# Patient Record
Sex: MALE | Race: WHITE | Employment: OTHER | URBAN - METROPOLITAN AREA
[De-identification: names, ages, dates, MRNs, and addresses within clinical notes are randomized per-mention and may not be internally consistent; named-entity substitution may affect disease eponyms.]

---

## 2017-01-16 ENCOUNTER — OFFICE VISIT (OUTPATIENT)
Dept: ORTHOPEDIC SURGERY | Age: 32
End: 2017-01-16

## 2017-01-16 VITALS — WEIGHT: 190.04 LBS | HEIGHT: 68 IN | BODY MASS INDEX: 28.8 KG/M2

## 2017-01-16 DIAGNOSIS — M25.552 LEFT HIP PAIN: ICD-10-CM

## 2017-01-16 DIAGNOSIS — T84.019D: Primary | ICD-10-CM

## 2017-01-16 DIAGNOSIS — Z96.649 S/P REVISION OF TOTAL HIP: ICD-10-CM

## 2017-01-16 PROCEDURE — G8419 CALC BMI OUT NRM PARAM NOF/U: HCPCS | Performed by: ORTHOPAEDIC SURGERY

## 2017-01-16 PROCEDURE — 73502 X-RAY EXAM HIP UNI 2-3 VIEWS: CPT | Performed by: ORTHOPAEDIC SURGERY

## 2017-01-16 PROCEDURE — 99213 OFFICE O/P EST LOW 20 MIN: CPT | Performed by: ORTHOPAEDIC SURGERY

## 2017-01-16 PROCEDURE — G8427 DOCREV CUR MEDS BY ELIG CLIN: HCPCS | Performed by: ORTHOPAEDIC SURGERY

## 2017-01-16 PROCEDURE — 1036F TOBACCO NON-USER: CPT | Performed by: ORTHOPAEDIC SURGERY

## 2017-01-16 PROCEDURE — G8484 FLU IMMUNIZE NO ADMIN: HCPCS | Performed by: ORTHOPAEDIC SURGERY

## 2017-01-16 RX ORDER — HYDROCODONE BITARTRATE AND ACETAMINOPHEN 10; 325 MG/1; MG/1
1 TABLET ORAL EVERY 8 HOURS PRN
COMMUNITY
End: 2017-01-31

## 2017-01-18 ENCOUNTER — HOSPITAL ENCOUNTER (OUTPATIENT)
Dept: OTHER | Age: 32
Discharge: HOME | End: 2017-01-18
Attending: ORTHOPAEDIC SURGERY | Admitting: ORTHOPAEDIC SURGERY

## 2017-01-18 DIAGNOSIS — T84.019D: ICD-10-CM

## 2017-01-18 LAB
BASOPHILS ABSOLUTE: 0.1 K/UL (ref 0–0.2)
BASOPHILS RELATIVE PERCENT: 0.7 %
C-REACTIVE PROTEIN: 12.4 MG/L (ref 0–5.1)
EOSINOPHILS ABSOLUTE: 0 K/UL (ref 0–0.6)
EOSINOPHILS RELATIVE PERCENT: 0.4 %
HCT VFR BLD CALC: 43.1 % (ref 40.5–52.5)
HEMOGLOBIN: 14.3 G/DL (ref 13.5–17.5)
LYMPHOCYTES ABSOLUTE: 1.1 K/UL (ref 1–5.1)
LYMPHOCYTES RELATIVE PERCENT: 14 %
MCH RBC QN AUTO: 29.8 PG (ref 26–34)
MCHC RBC AUTO-ENTMCNC: 33.1 G/DL (ref 31–36)
MCV RBC AUTO: 89.8 FL (ref 80–100)
MONOCYTES ABSOLUTE: 0.4 K/UL (ref 0–1.3)
MONOCYTES RELATIVE PERCENT: 5.3 %
NEUTROPHILS ABSOLUTE: 6 K/UL (ref 1.7–7.7)
NEUTROPHILS RELATIVE PERCENT: 79.6 %
PDW BLD-RTO: 13.5 % (ref 12.4–15.4)
PLATELET # BLD: 217 K/UL (ref 135–450)
PMV BLD AUTO: 10 FL (ref 5–10.5)
RBC # BLD: 4.8 M/UL (ref 4.2–5.9)
SEDIMENTATION RATE, ERYTHROCYTE: 10 MM/HR (ref 0–15)
WBC # BLD: 7.6 K/UL (ref 4–11)

## 2017-01-20 LAB — CHROMIUM, SERUM: 7.3 UG/L

## 2017-01-23 ENCOUNTER — TELEPHONE (OUTPATIENT)
Dept: ORTHOPEDIC SURGERY | Age: 32
End: 2017-01-23

## 2017-01-31 ENCOUNTER — HOSPITAL ENCOUNTER (OUTPATIENT)
Dept: SURGERY | Age: 32
Discharge: HOME | End: 2017-01-31
Attending: ORTHOPAEDIC SURGERY | Admitting: ORTHOPAEDIC SURGERY

## 2017-01-31 VITALS
BODY MASS INDEX: 28.73 KG/M2 | WEIGHT: 194 LBS | HEART RATE: 85 BPM | SYSTOLIC BLOOD PRESSURE: 132 MMHG | OXYGEN SATURATION: 97 % | DIASTOLIC BLOOD PRESSURE: 85 MMHG | HEIGHT: 69 IN | TEMPERATURE: 97.2 F | RESPIRATION RATE: 16 BRPM

## 2017-01-31 DIAGNOSIS — R52 PAIN: ICD-10-CM

## 2017-01-31 LAB
ANION GAP SERPL CALCULATED.3IONS-SCNC: 14 MMOL/L (ref 3–16)
BUN BLDV-MCNC: 15 MG/DL (ref 7–20)
CALCIUM SERPL-MCNC: 9.5 MG/DL (ref 8.3–10.6)
CHLORIDE BLD-SCNC: 99 MMOL/L (ref 99–110)
CO2: 22 MMOL/L (ref 21–32)
CREAT SERPL-MCNC: <0.5 MG/DL (ref 0.9–1.3)
GFR AFRICAN AMERICAN: >60
GFR NON-AFRICAN AMERICAN: >60
GLUCOSE BLD-MCNC: 319 MG/DL (ref 70–99)
GLUCOSE BLD-MCNC: 367 MG/DL (ref 70–99)
GLUCOSE BLD-MCNC: 386 MG/DL (ref 70–99)
PERFORMED ON: ABNORMAL
PERFORMED ON: ABNORMAL
POTASSIUM SERPL-SCNC: 4.1 MMOL/L (ref 3.5–5.1)
SODIUM BLD-SCNC: 135 MMOL/L (ref 136–145)

## 2017-01-31 RX ORDER — HYDROCODONE BITARTRATE AND ACETAMINOPHEN 10; 325 MG/1; MG/1
1 TABLET ORAL EVERY 8 HOURS PRN
Qty: 20 TABLET | Refills: 0 | Status: ON HOLD | OUTPATIENT
Start: 2017-01-31 | End: 2020-05-10

## 2017-01-31 RX ORDER — DIPHENHYDRAMINE HYDROCHLORIDE 50 MG/ML
12.5 INJECTION INTRAMUSCULAR; INTRAVENOUS
Status: ACTIVE | OUTPATIENT
Start: 2017-01-31 | End: 2017-01-31

## 2017-01-31 RX ORDER — HYDRALAZINE HYDROCHLORIDE 20 MG/ML
5 INJECTION INTRAMUSCULAR; INTRAVENOUS EVERY 10 MIN PRN
Status: DISCONTINUED | OUTPATIENT
Start: 2017-01-31 | End: 2017-02-01 | Stop reason: HOSPADM

## 2017-01-31 RX ORDER — LABETALOL HYDROCHLORIDE 5 MG/ML
5 INJECTION, SOLUTION INTRAVENOUS EVERY 10 MIN PRN
Status: DISCONTINUED | OUTPATIENT
Start: 2017-01-31 | End: 2017-02-01 | Stop reason: HOSPADM

## 2017-01-31 RX ORDER — PROMETHAZINE HYDROCHLORIDE 25 MG/ML
6.25 INJECTION, SOLUTION INTRAMUSCULAR; INTRAVENOUS
Status: DISCONTINUED | OUTPATIENT
Start: 2017-01-31 | End: 2017-02-01 | Stop reason: HOSPADM

## 2017-01-31 RX ORDER — OXYCODONE HYDROCHLORIDE AND ACETAMINOPHEN 5; 325 MG/1; MG/1
2 TABLET ORAL PRN
Status: COMPLETED | OUTPATIENT
Start: 2017-01-31 | End: 2017-01-31

## 2017-01-31 RX ORDER — LIDOCAINE HYDROCHLORIDE 10 MG/ML
2 INJECTION, SOLUTION INFILTRATION; PERINEURAL
Status: COMPLETED | OUTPATIENT
Start: 2017-01-31 | End: 2017-01-31

## 2017-01-31 RX ORDER — MORPHINE SULFATE 2 MG/ML
2 INJECTION, SOLUTION INTRAMUSCULAR; INTRAVENOUS EVERY 5 MIN PRN
Status: DISCONTINUED | OUTPATIENT
Start: 2017-01-31 | End: 2017-02-01 | Stop reason: HOSPADM

## 2017-01-31 RX ORDER — ONDANSETRON 2 MG/ML
4 INJECTION INTRAMUSCULAR; INTRAVENOUS PRN
Status: DISCONTINUED | OUTPATIENT
Start: 2017-01-31 | End: 2017-02-01 | Stop reason: HOSPADM

## 2017-01-31 RX ORDER — MIDAZOLAM HYDROCHLORIDE 1 MG/ML
2 INJECTION INTRAMUSCULAR; INTRAVENOUS ONCE
Status: COMPLETED | OUTPATIENT
Start: 2017-01-31 | End: 2017-01-31

## 2017-01-31 RX ORDER — OXYCODONE HYDROCHLORIDE AND ACETAMINOPHEN 5; 325 MG/1; MG/1
1 TABLET ORAL PRN
Status: COMPLETED | OUTPATIENT
Start: 2017-01-31 | End: 2017-01-31

## 2017-01-31 RX ORDER — SODIUM CHLORIDE, SODIUM LACTATE, POTASSIUM CHLORIDE, CALCIUM CHLORIDE 600; 310; 30; 20 MG/100ML; MG/100ML; MG/100ML; MG/100ML
INJECTION, SOLUTION INTRAVENOUS CONTINUOUS
Status: DISCONTINUED | OUTPATIENT
Start: 2017-01-31 | End: 2017-02-01 | Stop reason: HOSPADM

## 2017-01-31 RX ORDER — MORPHINE SULFATE 2 MG/ML
1 INJECTION, SOLUTION INTRAMUSCULAR; INTRAVENOUS EVERY 5 MIN PRN
Status: DISCONTINUED | OUTPATIENT
Start: 2017-01-31 | End: 2017-02-01 | Stop reason: HOSPADM

## 2017-01-31 RX ORDER — MEPERIDINE HYDROCHLORIDE 50 MG/ML
12.5 INJECTION INTRAMUSCULAR; INTRAVENOUS; SUBCUTANEOUS EVERY 5 MIN PRN
Status: DISCONTINUED | OUTPATIENT
Start: 2017-01-31 | End: 2017-02-01 | Stop reason: HOSPADM

## 2017-01-31 RX ADMIN — OXYCODONE HYDROCHLORIDE AND ACETAMINOPHEN 2 TABLET: 5; 325 TABLET ORAL at 08:55

## 2017-01-31 RX ADMIN — MIDAZOLAM HYDROCHLORIDE 2 MG: 1 INJECTION INTRAMUSCULAR; INTRAVENOUS at 07:00

## 2017-01-31 RX ADMIN — LIDOCAINE HYDROCHLORIDE 2 ML: 10 INJECTION, SOLUTION INFILTRATION; PERINEURAL at 06:35

## 2017-01-31 RX ADMIN — Medication 0.5 MG: at 08:03

## 2017-01-31 RX ADMIN — SODIUM CHLORIDE, SODIUM LACTATE, POTASSIUM CHLORIDE, CALCIUM CHLORIDE: 600; 310; 30; 20 INJECTION, SOLUTION INTRAVENOUS at 06:35

## 2017-01-31 ASSESSMENT — PAIN SCALES - GENERAL
PAINLEVEL_OUTOF10: 0
PAINLEVEL_OUTOF10: 9
PAINLEVEL_OUTOF10: 8
PAINLEVEL_OUTOF10: 0

## 2017-01-31 ASSESSMENT — PAIN - FUNCTIONAL ASSESSMENT: PAIN_FUNCTIONAL_ASSESSMENT: 0-10

## 2017-01-31 ASSESSMENT — PAIN DESCRIPTION - DESCRIPTORS: DESCRIPTORS: ACHING;SHARP

## 2017-02-06 ENCOUNTER — OFFICE VISIT (OUTPATIENT)
Dept: ORTHOPEDIC SURGERY | Age: 32
End: 2017-02-06

## 2017-02-06 DIAGNOSIS — T84.019D: Primary | ICD-10-CM

## 2017-02-06 PROCEDURE — G8484 FLU IMMUNIZE NO ADMIN: HCPCS | Performed by: ORTHOPAEDIC SURGERY

## 2017-02-06 PROCEDURE — 99214 OFFICE O/P EST MOD 30 MIN: CPT | Performed by: ORTHOPAEDIC SURGERY

## 2017-02-06 PROCEDURE — 1036F TOBACCO NON-USER: CPT | Performed by: ORTHOPAEDIC SURGERY

## 2017-02-06 PROCEDURE — G8419 CALC BMI OUT NRM PARAM NOF/U: HCPCS | Performed by: ORTHOPAEDIC SURGERY

## 2017-02-06 PROCEDURE — G8428 CUR MEDS NOT DOCUMENT: HCPCS | Performed by: ORTHOPAEDIC SURGERY

## 2017-02-20 ENCOUNTER — HOSPITAL ENCOUNTER (OUTPATIENT)
Dept: OTHER | Age: 32
Discharge: HOME | End: 2017-02-20
Attending: PHYSICIAN ASSISTANT | Admitting: PHYSICIAN ASSISTANT

## 2017-02-20 ENCOUNTER — HOSPITAL ENCOUNTER (OUTPATIENT)
Dept: PHYSICAL THERAPY | Age: 32
Discharge: HOME | End: 2017-02-28
Admitting: ORTHOPAEDIC SURGERY

## 2017-02-20 LAB
ALBUMIN SERPL-MCNC: 4.5 G/DL (ref 3.4–5)
ANION GAP SERPL CALCULATED.3IONS-SCNC: 15 MMOL/L (ref 3–16)
APTT: 25.3 SEC (ref 21–31.8)
BASOPHILS ABSOLUTE: 0 K/UL (ref 0–0.2)
BASOPHILS RELATIVE PERCENT: 0.4 %
BILIRUBIN URINE: NEGATIVE
BLOOD, URINE: NEGATIVE
BUN BLDV-MCNC: 11 MG/DL (ref 7–20)
CALCIUM SERPL-MCNC: 9.5 MG/DL (ref 8.3–10.6)
CHLORIDE BLD-SCNC: 97 MMOL/L (ref 99–110)
CLARITY: CLEAR
CO2: 23 MMOL/L (ref 21–32)
COLOR: YELLOW
CREAT SERPL-MCNC: <0.5 MG/DL (ref 0.9–1.3)
EOSINOPHILS ABSOLUTE: 0.1 K/UL (ref 0–0.6)
EOSINOPHILS RELATIVE PERCENT: 1.6 %
GFR AFRICAN AMERICAN: >60
GFR NON-AFRICAN AMERICAN: >60
GLUCOSE BLD-MCNC: 245 MG/DL (ref 70–99)
GLUCOSE URINE: >=1000 MG/DL
HCT VFR BLD CALC: 43.4 % (ref 40.5–52.5)
HEMOGLOBIN: 14.3 G/DL (ref 13.5–17.5)
INR BLD: 1.11 (ref 0.85–1.15)
KETONES, URINE: NEGATIVE MG/DL
LEUKOCYTE ESTERASE, URINE: NEGATIVE
LYMPHOCYTES ABSOLUTE: 2.1 K/UL (ref 1–5.1)
LYMPHOCYTES RELATIVE PERCENT: 31.6 %
MCH RBC QN AUTO: 29.5 PG (ref 26–34)
MCHC RBC AUTO-ENTMCNC: 33 G/DL (ref 31–36)
MCV RBC AUTO: 89.3 FL (ref 80–100)
MICROSCOPIC EXAMINATION: ABNORMAL
MONOCYTES ABSOLUTE: 0.6 K/UL (ref 0–1.3)
MONOCYTES RELATIVE PERCENT: 9.4 %
NEUTROPHILS ABSOLUTE: 3.7 K/UL (ref 1.7–7.7)
NEUTROPHILS RELATIVE PERCENT: 57 %
NITRITE, URINE: NEGATIVE
PDW BLD-RTO: 13.1 % (ref 12.4–15.4)
PH UA: 6
PLATELET # BLD: 203 K/UL (ref 135–450)
PMV BLD AUTO: 9.6 FL (ref 5–10.5)
POTASSIUM SERPL-SCNC: 3.8 MMOL/L (ref 3.5–5.1)
PROTEIN UA: NEGATIVE MG/DL
PROTHROMBIN TIME: 12.5 SEC (ref 9.6–13)
RBC # BLD: 4.86 M/UL (ref 4.2–5.9)
SODIUM BLD-SCNC: 135 MMOL/L (ref 136–145)
SPECIFIC GRAVITY UA: 1.02
TRANSFERRIN: 291 MG/DL (ref 200–360)
URINE TYPE: ABNORMAL
UROBILINOGEN, URINE: 0.2 E.U./DL
WBC # BLD: 6.5 K/UL (ref 4–11)

## 2017-02-21 LAB
ESTIMATED AVERAGE GLUCOSE: 191.5 MG/DL
HBA1C MFR BLD: 8.3 %

## 2017-02-22 LAB — URINE CULTURE, ROUTINE: NORMAL

## 2017-10-06 ENCOUNTER — HOSPITAL ENCOUNTER (OUTPATIENT)
Dept: PHYSICAL THERAPY | Age: 32
Discharge: HOME | End: 2017-10-31
Admitting: EMERGENCY MEDICINE

## 2017-10-06 NOTE — PLAN OF CARE
G-Codes  Functional Assessment Tool Used: LEFS  Score: 60%  Functional Limitation: Mobility: Walking and moving around  Mobility: Walking and Moving Around Current Status (): At least 40 percent but less than 60 percent impaired, limited or restricted  Mobility: Walking and Moving Around Goal Status (): At least 20 percent but less than 40 percent impaired, limited or restricted    Pain Scale: 6/10  Easing factors: rest, stretch  Provocative factors: walk, squat     Type: []Constant   [x]Intermittent  []Radiating []Localized []other:     Numbness/Tingling: none    Occupation/School:  (returned yesterday)    Living Status/Prior Level of Function: Independent with ADLs and IADLs    OBJECTIVE:     ROM LEFT RIGHT   HIP Flex     HIP Abd     HIP Ext     HIP IR     HIP ER     Knee ext 0 0   Knee Flex 130 132   Ankle PF     Ankle DF     Ankle In     Ankle Ev     Strength  LEFT RIGHT   HIP Flexors 4/5 4+/5   HIP Abductors     HIP Ext 4+/5 4+/5   Hip ER 4/5 4/5   Knee EXT (quad) 5/5 5/5   Knee Flex (HS) 4-/5 4+/5   Ankle DF     Ankle PF     Ankle Inv     Ankle EV          Circumference  Mid apex  7 cm prox             Reflexes/Sensation:    [x]Dermatomes/Myotomes intact    [x]Reflexes equal and normal bilaterally   []Other:    Joint mobility:    [x]Normal    []Hypo   []Hyper    Palpation: no TTP    Functional Mobility/Transfers: independent    Gait: (include devices/WB status) antalgic     Orthopedic Special Tests:     Gay + at 0, 30 deg                        [x] Patient history, allergies, meds reviewed. Medical chart reviewed. See intake form. Review Of Systems (ROS):  [x]Performed Review of systems (Integumentary, CardioPulmonary, Neurological) by intake and observation. Intake form has been scanned into medical record. Patient has been instructed to contact their primary care physician regarding ROS issues if not already being addressed at this time.       Co-morbidities/Complexities (which will affect course of rehabilitation):   []None           Arthritic conditions   [x]Rheumatoid arthritis (M05.9)  [x]Osteoarthritis (M19.91)   Cardiovascular conditions   [x]Hypertension (I10)  []Hyperlipidemia (E78.5)  []Angina pectoris (I20)  []Atherosclerosis (I70)   Musculoskeletal conditions   []Disc pathology   []Congenital spine pathologies   []Prior surgical intervention  []Osteoporosis (M81.8)  []Osteopenia (M85.8)   Endocrine conditions   []Hypothyroid (E03.9)  []Hyperthyroid Gastrointestinal conditions   []Constipation (Y43.69)   Metabolic conditions   []Morbid obesity (E66.01)  [x]Diabetes type 1(E10.65) or 2 (E11.65)   []Neuropathy (G60.9)     Pulmonary conditions   []Asthma (J45)  []Coughing   []COPD (J44.9)   Psychological Disorders  [x]Anxiety (F41.9)  [x]Depression (F32.9)   []Other:   []Other:          Barriers to/and or personal factors that will affect rehab potential:              []Age  []Sex              []Motivation/Lack of Motivation                        [x]Co-Morbidities              []Cognitive Function, education/learning barriers              []Environmental, home barriers              []profession/work barriers  []past PT/medical experience  []other:  Justification:     Falls Risk Assessment (30 days):   [x] Falls Risk assessed and no intervention required. [] Falls Risk assessed and Patient requires intervention due to being higher risk   TUG score (>12s at risk):     [] Falls education provided, including       G-Codes:  PT G-Codes  Functional Assessment Tool Used: LEFS  Score: 60%  Functional Limitation: Mobility: Walking and moving around  Mobility: Walking and Moving Around Current Status (): At least 40 percent but less than 60 percent impaired, limited or restricted  Mobility: Walking and Moving Around Goal Status ():  At least 20 percent but less than 40 percent impaired, limited or restricted    ASSESSMENT:   Functional Impairments:     []Noted lumbar/proximal hip/LE evaluation/treatment:    []Excellent   [x]Good    []Fair   []Poor    Physical Therapy Evaluation Complexity Justification  [x] A history of present problem with:  [] no personal factors and/or comorbidities that impact the plan of care;  [x]1-2 personal factors and/or comorbidities that impact the plan of care  []3 personal factors and/or comorbidities that impact the plan of care  [x] An examination of body systems using standardized tests and measures addressing any of the following: body structures and functions (impairments), activity limitations, and/or participation restrictions;:  [] a total of 1-2 or more elements   [x] a total of 3 or more elements   [] a total of 4 or more elements   [x] A clinical presentation with:  [x] stable and/or uncomplicated characteristics   [] evolving clinical presentation with changing characteristics  [] unstable and unpredictable characteristics;   [x] Clinical decision making of [x] low, [] moderate, [] high complexity using standardized patient assessment instrument and/or measurable assessment of functional outcome. [x] EVAL (LOW) 21214 (typically 20 minutes face-to-face)  [] EVAL (MOD) 71859 (typically 30 minutes face-to-face)  [] EVAL (HIGH) 21636 (typically 45 minutes face-to-face)  [] RE-EVAL     PLAN:   Frequency/Duration:  1 days per week for 4-6 Weeks:  Interventions:  [x]  Therapeutic exercise including: strength training, ROM, for Lower extremity and core   [x]  NMR activation and proprioception for LE, Glutes and Core   [x]  Manual therapy as indicated for LE, Hip and spine to include: Dry Needling/IASTM, STM, PROM, Gr I-IV mobilizations, manipulation. [x] Modalities as needed that may include: thermal agents, E-stim, Biofeedback, US, iontophoresis as indicated  [x] Patient education on joint protection, postural re-education, activity modification, progression of HEP.     HEP instruction: (see scanned forms)    GOALS:  Patient stated goal: get back to

## 2017-10-06 NOTE — FLOWSHEET NOTE
[facility]  Orthopaedics and Sports Rehabilitation, [facility]    Physical Therapy Daily Treatment Note  Date:  10/6/2017    Patient Name:  Ilean Gaucher    :  1985  MRN: [id number]  Restrictions/Precautions:    Medical/Treatment Diagnosis Information:  · Diagnosis: M25.562  · Treatment Diagnosis: L knee pain  Insurance/Certification information:  PT Insurance Information:  [facility]  Physician Information:  Referring Practitioner: VIOLET Leary  Plan of care signed (Y/N):     Date of Patient follow up with Physician:     G-Code (if applicable):      Date G-Code Applied:  10/6/17  PT G-Codes  Functional Assessment Tool Used: LEFS  Score: 60%  Functional Limitation: Mobility: Walking and moving around  Mobility: Walking and Moving Around Current Status (): At least 40 percent but less than 60 percent impaired, limited or restricted  Mobility: Walking and Moving Around Goal Status ():  At least 20 percent but less than 40 percent impaired, limited or restricted    Progress Note: [x]  Yes  []  No  Next due by: Visit #10       Latex Allergy:  [x]NO      []YES  Preferred Language for Healthcare:   [x]English       []other:    Visit # Insurance Allowable   1 9 visits approved through 10/29     Pain level:  6/10     SUBJECTIVE:  See eval    OBJECTIVE: See eval  Observation:   Test measurements:      RESTRICTIONS/PRECAUTIONS: none per MD    Exercises/Interventions:     Therapeutic Ex Sets/sec Reps Notes HEP   Prone quad stretch 5 20\"     SLR 2 10     Clam shells 2 10     Bridges 2 10     Wall sits 3 20\"     HS stretch 5 10\"     LAQ 10                                                                     Manual Intervention                                                 NMR re-education                                                                          Therapeutic Exercise and NMR EXR  [x] (02376) Provided verbal/tactile cueing for activities related to strengthening, flexibility, Code Treatment Minutes: 15   Total Treatment Minutes: 15     [x] EVAL (LOW) 94804 (typically 20 minutes face-to-face)  [x] AJ(69178) x  1   [] IONTO  [] NMR (19078) x      [] VASO  [] Manual (08597) x       [] Other:  [] TA x       [] Mech Traction (17113)  [] ES(attended) (43584)      [] ES (un) (66784):     GOALS:   Short Term Goals: To be achieved in: 2 weeks  1. Independent in HEP and progression per patient tolerance, in order to prevent re-injury. 2. Patient will have a decrease in pain to facilitate improvement in movement, function, and ADLs as indicated by Functional Deficits.     Long Term Goals: To be achieved in: 6 weeks  1. Disability index score of 30% or less for the LEFS to assist with reaching prior level of function. 2. Patient will demonstrate an increase in Strength to good proximal hip strength and control, within 5lb HHD in LE to allow for proper functional mobility as indicated by patients Functional Deficits. 3. Patient will return to all functional activities without increased symptoms or restriction. Progression Towards Functional goals:  [] Patient is progressing as expected towards functional goals listed. [] Progression is slowed due to complexities listed. [] Progression has been slowed due to co-morbidities.   [x] Plan just implemented, too soon to assess goals progression  [] Other:     ASSESSMENT:  See eval    Treatment/Activity Tolerance:  [x] Patient tolerated treatment well [] Patient limited by fatique  [] Patient limited by pain  [] Patient limited by other medical complications  [] Other:     Prognosis: [x] Good [] Fair  [] Poor    Patient Requires Follow-up: [x] Yes  [] No    PLAN: See eval  [] Continue per plan of care [] Alter current plan (see comments)  [x] Plan of care initiated [] Hold pending MD visit [] Discharge    Electronically signed by: Danny Quintana PT, DPT

## 2017-10-09 ENCOUNTER — HOSPITAL ENCOUNTER (OUTPATIENT)
Dept: PHYSICAL THERAPY | Age: 32
Discharge: HOME | End: 2017-10-09
Admitting: EMERGENCY MEDICINE

## 2017-10-16 ENCOUNTER — HOSPITAL ENCOUNTER (OUTPATIENT)
Dept: PHYSICAL THERAPY | Age: 32
Discharge: HOME | End: 2017-10-16
Admitting: EMERGENCY MEDICINE

## 2017-10-16 NOTE — FLOWSHEET NOTE
[facility]  Orthopaedics and Sports Rehabilitation, [facility]    Physical Therapy Daily Treatment Note  Date:  10/16/2017    Patient Name:  Wilver Lilly    :  1985  MRN: [id number]  Restrictions/Precautions:    Medical/Treatment Diagnosis Information:  · Diagnosis: M25.562  · Treatment Diagnosis: L knee pain  Insurance/Certification information:  PT Insurance Information:  [facility]  Physician Information:  Referring Practitioner: VIOLET Martinez  Plan of care signed (Y/N):     Date of Patient follow up with Physician:     G-Code (if applicable):      Date G-Code Applied:  10/6/17       Progress Note: [x]  Yes  []  No  Next due by: Visit #10       Latex Allergy:  [x]NO      []YES  Preferred Language for Healthcare:   [x]English       []other:    Visit # Insurance Allowable   3 9 visits approved through 10/29     Pain level:  0/10     SUBJECTIVE:  Patient reports that that he had a little soreness yesterday and this morning, but he is not having pain.     OBJECTIVE: See eval  Observation:   Test measurements:      RESTRICTIONS/PRECAUTIONS: none per MD    Exercises/Interventions:     Therapeutic Ex Sets/sec Reps Notes HEP   Bike 5'      Prone quad stretch 5 20\"     SLR 2 10     Clam shells 2 10     Bridges 2 10     Wall sits 3 30\"     HS stretch 5 10\"     LAQ 10      Cybex  Knee flexion  Knee ext   3  3   10  10   40#  60#    Leg press 3 10 120# Dl, 70# SL    MIRLANDE  Abduction  Extension   2  2   10  10   50#  50#    Step ups 2 15     Squats 2 10 To chair     Side stepping  50\" Orange band                         Manual Intervention                                                 NMR re-education       SL balance on foam 2 30\"                                                                 Therapeutic Exercise and NMR EXR  [x] (93290) Provided verbal/tactile cueing for activities related to strengthening, flexibility, endurance, ROM for improvements in LE, proximal hip, and core control with self care, mobility, lifting, ambulation.  [] (50241) Provided verbal/tactile cueing for activities related to improving balance, coordination, kinesthetic sense, posture, motor skill, proprioception  to assist with LE, proximal hip, and core control in self care, mobility, lifting, ambulation and eccentric single leg control. NMR and Therapeutic Activities:    [x] (22068 or 17851) Provided verbal/tactile cueing for activities related to improving balance, coordination, kinesthetic sense, posture, motor skill, proprioception and motor activation to allow for proper function of core, proximal hip and LE with self care and ADLs  [] (06067) Gait Re-education- Provided training and instruction to the patient for proper LE, core and proximal hip recruitment and positioning and eccentric body weight control with ambulation re-education including up and down stairs     Home Exercise Program:    [x] (16781) Reviewed/Progressed HEP activities related to strengthening, flexibility, endurance, ROM of core, proximal hip and LE for functional self-care, mobility, lifting and ambulation/stair navigation   [] (53571)Reviewed/Progressed HEP activities related to improving balance, coordination, kinesthetic sense, posture, motor skill, proprioception of core, proximal hip and LE for self care, mobility, lifting, and ambulation/stair navigation      Manual Treatments:  PROM / STM / Oscillations-Mobs:  G-I, II, III, IV (PA's, Inf., Post.)  [] (85900) Provided manual therapy to mobilize LE, proximal hip and/or LS spine soft tissue/joints for the purpose of modulating pain, promoting relaxation,  increasing ROM, reducing/eliminating soft tissue swelling/inflammation/restriction, improving soft tissue extensibility and allowing for proper ROM for normal function with self care, mobility, lifting and ambulation.      Modalities:  Declined     Charges:  Timed Code Treatment Minutes: 40   Total Treatment Minutes: 40     [] LORENA (LOW) 75407 (typically 20 minutes face-to-face)  [x] IL(34537) x  2   [] IONTO  [x] NMR (07878) x  1   [] VASO  [] Manual (98037) x       [] Other:  [] TA x       [] Mech Traction (41181)  [] ES(attended) (05900)      [] ES (un) (86154):     GOALS:   Short Term Goals: To be achieved in: 2 weeks  1. Independent in HEP and progression per patient tolerance, in order to prevent re-injury. 2. Patient will have a decrease in pain to facilitate improvement in movement, function, and ADLs as indicated by Functional Deficits.     Long Term Goals: To be achieved in: 6 weeks  1. Disability index score of 30% or less for the LEFS to assist with reaching prior level of function. 2. Patient will demonstrate an increase in Strength to good proximal hip strength and control, within 5lb HHD in LE to allow for proper functional mobility as indicated by patients Functional Deficits. 3. Patient will return to all functional activities without increased symptoms or restriction. Progression Towards Functional goals:  [] Patient is progressing as expected towards functional goals listed. [] Progression is slowed due to complexities listed. [] Progression has been slowed due to co-morbidities. [x] Plan just implemented, too soon to assess goals progression  [] Other:     ASSESSMENT:  Patient was able to progress into higher level resisted exercises for the knee and hip without pain.     Treatment/Activity Tolerance:  [x] Patient tolerated treatment well [] Patient limited by fatique  [] Patient limited by pain  [] Patient limited by other medical complications  [] Other:     Prognosis: [x] Good [] Fair  [] Poor    Patient Requires Follow-up: [x] Yes  [] No    PLAN: See eval  [] Continue per plan of care [] Alter current plan (see comments)  [x] Plan of care initiated [] Hold pending MD visit [] Discharge    Electronically signed by: Bo Guillaume PT, DPT

## 2017-10-19 ENCOUNTER — HOSPITAL ENCOUNTER (OUTPATIENT)
Dept: PHYSICAL THERAPY | Age: 32
Discharge: HOME | End: 2017-10-19
Admitting: EMERGENCY MEDICINE

## 2017-11-01 ENCOUNTER — HOSPITAL ENCOUNTER (OUTPATIENT)
Dept: PHYSICAL THERAPY | Age: 32
Discharge: HOME | End: 2017-11-30
Attending: EMERGENCY MEDICINE | Admitting: EMERGENCY MEDICINE

## 2020-05-10 ENCOUNTER — HOSPITAL ENCOUNTER (INPATIENT)
Age: 35
LOS: 3 days | Discharge: HOME | DRG: 638 | End: 2020-05-13
Attending: EMERGENCY MEDICINE | Admitting: HOSPITALIST
Payer: COMMERCIAL

## 2020-05-10 ENCOUNTER — APPOINTMENT (OUTPATIENT)
Dept: GENERAL RADIOLOGY | Age: 35
DRG: 638 | End: 2020-05-10
Payer: COMMERCIAL

## 2020-05-10 PROBLEM — E11.10 DKA, TYPE 2, NOT AT GOAL (HCC): Status: ACTIVE | Noted: 2020-05-10

## 2020-05-10 LAB
A/G RATIO: 1.6 (ref 1.1–2.2)
ALBUMIN SERPL-MCNC: 4.6 G/DL (ref 3.4–5)
ALP BLD-CCNC: 106 U/L (ref 40–129)
ALT SERPL-CCNC: 12 U/L (ref 10–40)
ANION GAP SERPL CALCULATED.3IONS-SCNC: 34 MMOL/L (ref 3–16)
AST SERPL-CCNC: 7 U/L (ref 15–37)
BASE EXCESS VENOUS: -16.4 MMOL/L (ref -3–3)
BASOPHILS ABSOLUTE: 0 K/UL (ref 0–0.2)
BASOPHILS RELATIVE PERCENT: 0.5 %
BETA-HYDROXYBUTYRATE: >8 MMOL/L (ref 0–0.27)
BILIRUB SERPL-MCNC: 0.5 MG/DL (ref 0–1)
BUN BLDV-MCNC: 42 MG/DL (ref 7–20)
CALCIUM SERPL-MCNC: 9.3 MG/DL (ref 8.3–10.6)
CARBOXYHEMOGLOBIN: 1.3 % (ref 0–1.5)
CHLORIDE BLD-SCNC: 83 MMOL/L (ref 99–110)
CHP ED QC CHECK: YES
CO2: 6 MMOL/L (ref 21–32)
CREAT SERPL-MCNC: 2.5 MG/DL (ref 0.9–1.3)
EKG ATRIAL RATE: 113 BPM
EKG DIAGNOSIS: NORMAL
EKG P AXIS: 52 DEGREES
EKG P-R INTERVAL: 122 MS
EKG Q-T INTERVAL: 346 MS
EKG QRS DURATION: 82 MS
EKG QTC CALCULATION (BAZETT): 474 MS
EKG R AXIS: 73 DEGREES
EKG T AXIS: 52 DEGREES
EKG VENTRICULAR RATE: 113 BPM
EOSINOPHILS ABSOLUTE: 0 K/UL (ref 0–0.6)
EOSINOPHILS RELATIVE PERCENT: 0 %
GFR AFRICAN AMERICAN: 35
GFR NON-AFRICAN AMERICAN: 29
GLOBULIN: 2.9 G/DL
GLUCOSE BLD-MCNC: 253 MG/DL (ref 70–99)
GLUCOSE BLD-MCNC: 502 MG/DL
GLUCOSE BLD-MCNC: 502 MG/DL (ref 70–99)
GLUCOSE BLD-MCNC: 508 MG/DL (ref 70–99)
GLUCOSE BLD-MCNC: 919 MG/DL (ref 70–99)
GLUCOSE BLD-MCNC: >600 MG/DL (ref 70–99)
HCO3 VENOUS: 7.4 MMOL/L (ref 23–29)
HCT VFR BLD CALC: 45.8 % (ref 40.5–52.5)
HEMOGLOBIN: 14.5 G/DL (ref 13.5–17.5)
INR BLD: 1.01 (ref 0.86–1.14)
LIPASE: 55 U/L (ref 13–60)
LYMPHOCYTES ABSOLUTE: 0.5 K/UL (ref 1–5.1)
LYMPHOCYTES RELATIVE PERCENT: 5.1 %
MCH RBC QN AUTO: 31.3 PG (ref 26–34)
MCHC RBC AUTO-ENTMCNC: 31.6 G/DL (ref 31–36)
MCV RBC AUTO: 99 FL (ref 80–100)
METHEMOGLOBIN VENOUS: 0.1 %
MONOCYTES ABSOLUTE: 1 K/UL (ref 0–1.3)
MONOCYTES RELATIVE PERCENT: 9.8 %
NEUTROPHILS ABSOLUTE: 8.6 K/UL (ref 1.7–7.7)
NEUTROPHILS RELATIVE PERCENT: 84.6 %
O2 CONTENT, VEN: 21 VOL %
O2 SAT, VEN: 98 %
O2 THERAPY: ABNORMAL
PCO2, VEN: 16.2 MMHG (ref 40–50)
PDW BLD-RTO: 15.2 % (ref 12.4–15.4)
PERFORMED ON: ABNORMAL
PH VENOUS: 7.28 (ref 7.35–7.45)
PLATELET # BLD: 266 K/UL (ref 135–450)
PMV BLD AUTO: 9 FL (ref 5–10.5)
PO2, VEN: 108.2 MMHG (ref 25–40)
POTASSIUM REFLEX MAGNESIUM: 5.2 MMOL/L (ref 3.5–5.1)
PROTHROMBIN TIME: 11.7 SEC (ref 10–13.2)
RBC # BLD: 4.62 M/UL (ref 4.2–5.9)
SODIUM BLD-SCNC: 123 MMOL/L (ref 136–145)
TCO2 CALC VENOUS: 8 MMOL/L
TOTAL PROTEIN: 7.5 G/DL (ref 6.4–8.2)
TROPONIN: <0.01 NG/ML
WBC # BLD: 10.2 K/UL (ref 4–11)

## 2020-05-10 PROCEDURE — 84100 ASSAY OF PHOSPHORUS: CPT

## 2020-05-10 PROCEDURE — 99291 CRITICAL CARE FIRST HOUR: CPT | Performed by: INTERNAL MEDICINE

## 2020-05-10 PROCEDURE — 93010 ELECTROCARDIOGRAM REPORT: CPT | Performed by: INTERNAL MEDICINE

## 2020-05-10 PROCEDURE — 83735 ASSAY OF MAGNESIUM: CPT

## 2020-05-10 PROCEDURE — 80053 COMPREHEN METABOLIC PANEL: CPT

## 2020-05-10 PROCEDURE — 71045 X-RAY EXAM CHEST 1 VIEW: CPT

## 2020-05-10 PROCEDURE — 2580000003 HC RX 258: Performed by: EMERGENCY MEDICINE

## 2020-05-10 PROCEDURE — 85610 PROTHROMBIN TIME: CPT

## 2020-05-10 PROCEDURE — 82803 BLOOD GASES ANY COMBINATION: CPT

## 2020-05-10 PROCEDURE — 2000000000 HC ICU R&B

## 2020-05-10 PROCEDURE — 99285 EMERGENCY DEPT VISIT HI MDM: CPT

## 2020-05-10 PROCEDURE — 85025 COMPLETE CBC W/AUTO DIFF WBC: CPT

## 2020-05-10 PROCEDURE — 2580000003 HC RX 258: Performed by: HOSPITALIST

## 2020-05-10 PROCEDURE — 83036 HEMOGLOBIN GLYCOSYLATED A1C: CPT

## 2020-05-10 PROCEDURE — 93005 ELECTROCARDIOGRAM TRACING: CPT | Performed by: EMERGENCY MEDICINE

## 2020-05-10 PROCEDURE — 6370000000 HC RX 637 (ALT 250 FOR IP): Performed by: HOSPITALIST

## 2020-05-10 PROCEDURE — 82010 KETONE BODYS QUAN: CPT

## 2020-05-10 PROCEDURE — 84484 ASSAY OF TROPONIN QUANT: CPT

## 2020-05-10 PROCEDURE — 6370000000 HC RX 637 (ALT 250 FOR IP): Performed by: EMERGENCY MEDICINE

## 2020-05-10 PROCEDURE — 36415 COLL VENOUS BLD VENIPUNCTURE: CPT

## 2020-05-10 PROCEDURE — 83690 ASSAY OF LIPASE: CPT

## 2020-05-10 RX ORDER — BUPROPION HYDROCHLORIDE 100 MG/1
100 TABLET, EXTENDED RELEASE ORAL DAILY
Status: DISCONTINUED | OUTPATIENT
Start: 2020-05-11 | End: 2020-05-13 | Stop reason: HOSPADM

## 2020-05-10 RX ORDER — DEXTROSE MONOHYDRATE 50 MG/ML
100 INJECTION, SOLUTION INTRAVENOUS PRN
Status: DISCONTINUED | OUTPATIENT
Start: 2020-05-10 | End: 2020-05-13 | Stop reason: HOSPADM

## 2020-05-10 RX ORDER — 0.9 % SODIUM CHLORIDE 0.9 %
1000 INTRAVENOUS SOLUTION INTRAVENOUS ONCE
Status: COMPLETED | OUTPATIENT
Start: 2020-05-10 | End: 2020-05-10

## 2020-05-10 RX ORDER — POTASSIUM CHLORIDE 7.45 MG/ML
10 INJECTION INTRAVENOUS PRN
Status: DISCONTINUED | OUTPATIENT
Start: 2020-05-10 | End: 2020-05-12

## 2020-05-10 RX ORDER — TIZANIDINE 4 MG/1
4 TABLET ORAL EVERY 6 HOURS PRN
Status: DISCONTINUED | OUTPATIENT
Start: 2020-05-10 | End: 2020-05-13 | Stop reason: HOSPADM

## 2020-05-10 RX ORDER — ACETAMINOPHEN 500 MG
1000 TABLET ORAL ONCE
Status: COMPLETED | OUTPATIENT
Start: 2020-05-10 | End: 2020-05-10

## 2020-05-10 RX ORDER — DULOXETIN HYDROCHLORIDE 60 MG/1
60 CAPSULE, DELAYED RELEASE ORAL DAILY
Status: DISCONTINUED | OUTPATIENT
Start: 2020-05-11 | End: 2020-05-13 | Stop reason: HOSPADM

## 2020-05-10 RX ORDER — NICOTINE POLACRILEX 4 MG
15 LOZENGE BUCCAL PRN
Status: DISCONTINUED | OUTPATIENT
Start: 2020-05-10 | End: 2020-05-13 | Stop reason: HOSPADM

## 2020-05-10 RX ORDER — LORAZEPAM 1 MG/1
1 TABLET ORAL EVERY 4 HOURS PRN
Status: DISCONTINUED | OUTPATIENT
Start: 2020-05-10 | End: 2020-05-13 | Stop reason: HOSPADM

## 2020-05-10 RX ORDER — DEXTROSE MONOHYDRATE 25 G/50ML
12.5 INJECTION, SOLUTION INTRAVENOUS PRN
Status: DISCONTINUED | OUTPATIENT
Start: 2020-05-10 | End: 2020-05-10

## 2020-05-10 RX ORDER — HEPARIN SODIUM 5000 [USP'U]/ML
5000 INJECTION, SOLUTION INTRAVENOUS; SUBCUTANEOUS EVERY 8 HOURS SCHEDULED
Status: DISCONTINUED | OUTPATIENT
Start: 2020-05-10 | End: 2020-05-11

## 2020-05-10 RX ORDER — MAGNESIUM SULFATE 1 G/100ML
1 INJECTION INTRAVENOUS PRN
Status: DISCONTINUED | OUTPATIENT
Start: 2020-05-10 | End: 2020-05-12

## 2020-05-10 RX ORDER — TAMSULOSIN HYDROCHLORIDE 0.4 MG/1
0.4 CAPSULE ORAL NIGHTLY
Status: DISCONTINUED | OUTPATIENT
Start: 2020-05-10 | End: 2020-05-13 | Stop reason: HOSPADM

## 2020-05-10 RX ORDER — PANTOPRAZOLE SODIUM 40 MG/1
40 TABLET, DELAYED RELEASE ORAL
Status: DISCONTINUED | OUTPATIENT
Start: 2020-05-11 | End: 2020-05-13 | Stop reason: HOSPADM

## 2020-05-10 RX ORDER — HYDROXYCHLOROQUINE SULFATE 200 MG/1
200 TABLET, FILM COATED ORAL 2 TIMES DAILY
Status: DISCONTINUED | OUTPATIENT
Start: 2020-05-10 | End: 2020-05-13 | Stop reason: HOSPADM

## 2020-05-10 RX ORDER — SODIUM CHLORIDE 450 MG/100ML
INJECTION, SOLUTION INTRAVENOUS CONTINUOUS
Status: DISCONTINUED | OUTPATIENT
Start: 2020-05-10 | End: 2020-05-12

## 2020-05-10 RX ORDER — DEXTROSE AND SODIUM CHLORIDE 5; .45 G/100ML; G/100ML
INJECTION, SOLUTION INTRAVENOUS CONTINUOUS PRN
Status: DISCONTINUED | OUTPATIENT
Start: 2020-05-10 | End: 2020-05-12

## 2020-05-10 RX ORDER — HYDROCODONE BITARTRATE AND ACETAMINOPHEN 10; 325 MG/1; MG/1
1 TABLET ORAL EVERY 8 HOURS PRN
Status: DISCONTINUED | OUTPATIENT
Start: 2020-05-10 | End: 2020-05-13 | Stop reason: HOSPADM

## 2020-05-10 RX ORDER — DEXTROSE MONOHYDRATE 25 G/50ML
12.5 INJECTION, SOLUTION INTRAVENOUS PRN
Status: DISCONTINUED | OUTPATIENT
Start: 2020-05-10 | End: 2020-05-13 | Stop reason: HOSPADM

## 2020-05-10 RX ADMIN — SODIUM CHLORIDE 1000 ML: 9 INJECTION, SOLUTION INTRAVENOUS at 20:02

## 2020-05-10 RX ADMIN — ACETAMINOPHEN 1000 MG: 500 TABLET ORAL at 21:21

## 2020-05-10 RX ADMIN — SODIUM CHLORIDE 10.95 UNITS/HR: 9 INJECTION, SOLUTION INTRAVENOUS at 22:47

## 2020-05-10 RX ADMIN — SODIUM CHLORIDE 1000 ML: 9 INJECTION, SOLUTION INTRAVENOUS at 18:43

## 2020-05-10 RX ADMIN — SODIUM CHLORIDE: 4.5 INJECTION, SOLUTION INTRAVENOUS at 22:57

## 2020-05-10 RX ADMIN — SODIUM CHLORIDE 7.3 UNITS/HR: 9 INJECTION, SOLUTION INTRAVENOUS at 20:14

## 2020-05-10 ASSESSMENT — PAIN SCALES - GENERAL
PAINLEVEL_OUTOF10: 0
PAINLEVEL_OUTOF10: 8

## 2020-05-10 NOTE — ED NOTES
Bed: 04  Expected date:   Expected time:   Means of arrival:   Comments:  Conor Orellana  05/10/20 6395

## 2020-05-10 NOTE — ED NOTES
Patients BG reading HI on monitor-MD Zack Streeter made aware.       Xin Quintanilla, RN  05/10/20 3195

## 2020-05-10 NOTE — ED PROVIDER NOTES
appreciated. RADIOLOGY (Per Emergency Physician): Interpretation per the Radiologist below, if available at the time of this note:  Xr Chest Portable    Result Date: 5/10/2020  EXAMINATION: [address] [place] [address] 5/10/2020 5:29 pm COMPARISON: November 18, 2014 HISTORY: ORDERING SYSTEM PROVIDED HISTORY: cp TECHNOLOGIST PROVIDED HISTORY: Reason for exam:->cp Reason for Exam: Chest pain Acuity: Acute Type of Exam: Initial Additional signs and symptoms: chest pain no hx of problems per pt FINDINGS: Adequate inspiration is noted. No focal area of consolidation or pneumothorax is present. Heart size, mediastinal contours and bony structures appear normal for age.      No evidence of acute cardiopulmonary disease       LABS:  Labs Reviewed   BETA-HYDROXYBUTYRATE - Abnormal; Notable for the following components:       Result Value    Beta-Hydroxybutyrate >8.00 (*)     All other components within normal limits    Narrative:     Ruthie STEWART tel. [phone],  Chemistry results called to and read back by Rd Dai rn, 05/10/2020  19:54, by STEMI  Performed at:  [facility] [address],  [place]   Phone [phone]   BLOOD GAS, VENOUS - Abnormal; Notable for the following components:    pH, [facility] 7.280 (*)     pCO2, [facility] 16.2 (*)     pO2, [facility] 108.2 (*)     HCO3, Venous 7.4 (*)     Base Excess, [facility] -16.4 (*)     All other components within normal limits    Narrative:     Performed at:  [facility] [address],  [place], [employer]   Phone [phone]   CBC WITH AUTO DIFFERENTIAL - Abnormal; Notable for the following components:    Neutrophils Absolute 8.6 (*)     Lymphocytes Absolute 0.5 (*)     All other components within normal limits    Narrative:     Performed at:  [facility] [address],  [place], [employer]   Phone [phone] [address] [facility] GLUCOSE   POCT GLUCOSE   POCT GLUCOSE   POCT GLUCOSE   POCT GLUCOSE   POCT GLUCOSE   POCT GLUCOSE   POCT GLUCOSE   POCT GLUCOSE   POCT GLUCOSE   POCT GLUCOSE   POCT GLUCOSE       All other labs were within normal range or not returned as of this dictation. EMERGENCY DEPARTMENT COURSE and DIFFERENTIAL DIAGNOSIS/MDM:   Vitals:    Vitals:    05/10/20 1810 05/10/20 1908 05/10/20 2003   BP: 132/78 134/62 137/75   Pulse: 121 115 116   Resp: 18 19 22   Temp: 98.6 °F (37 °C) 98.6 °F (37 °C)    TempSrc: Oral Oral    SpO2: 99% 100% 99%   Weight: 160 lb (72.6 kg)     Height: 5' 9\" (1.753 m)         Medications   glucose (GLUTOSE) 40 % oral gel 15 g (has no administration in time range)   dextrose 50 % IV solution (has no administration in time range)   glucagon (rDNA) injection 1 mg (has no administration in time range)   dextrose 5 % solution (has no administration in time range)   insulin regular (HUMULIN R;NOVOLIN R) 100 Units in sodium chloride 0.9 % 100 mL infusion (has no administration in time range)   0.9 % sodium chloride bolus (1,000 mLs Intravenous New Bag 5/10/20 2002)   0.9 % sodium chloride bolus (0 mLs Intravenous Stopped 5/10/20 2002)       MDM. Cardiac work-up, VBG, beta hydroxybutyrate, IV fluids initiated. Clinically suspect DKA as patient has metabolic acidosis so insulin drip was initiated. Patient meets DKA criteria. Also has acute kidney injury. A total 2 L of IV fluids were ordered. Clinically do not suspect ACS. We will hold off on aspirin for now. Case to be discussed with hospitalist for admission. CRITICAL CARE TIME: 35 minutes excluding billable procedure time: Treatment of patient with DKA including aggressive fluid resuscitation, initiation of insulin drip, complex work-up, admission to ICU, multiple re-evaluations, potential deterioration.       CONSULTS:  IP CONSULT TO HOSPITALIST    PROCEDURES:  Unless otherwise noted below, none     Procedures    FINAL IMPRESSION      1.

## 2020-05-11 LAB
ANION GAP SERPL CALCULATED.3IONS-SCNC: 13 MMOL/L (ref 3–16)
ANION GAP SERPL CALCULATED.3IONS-SCNC: 15 MMOL/L (ref 3–16)
ANION GAP SERPL CALCULATED.3IONS-SCNC: 20 MMOL/L (ref 3–16)
BASOPHILS ABSOLUTE: 0 K/UL (ref 0–0.2)
BASOPHILS RELATIVE PERCENT: 0.2 %
BILIRUBIN URINE: ABNORMAL
BLOOD, URINE: NEGATIVE
BUN BLDV-MCNC: 14 MG/DL (ref 7–20)
BUN BLDV-MCNC: 19 MG/DL (ref 7–20)
BUN BLDV-MCNC: 23 MG/DL (ref 7–20)
BUN BLDV-MCNC: 28 MG/DL (ref 7–20)
BUN BLDV-MCNC: 30 MG/DL (ref 7–20)
BUN BLDV-MCNC: 30 MG/DL (ref 7–20)
CALCIUM SERPL-MCNC: 7.9 MG/DL (ref 8.3–10.6)
CALCIUM SERPL-MCNC: 8.1 MG/DL (ref 8.3–10.6)
CALCIUM SERPL-MCNC: 8.2 MG/DL (ref 8.3–10.6)
CALCIUM SERPL-MCNC: 8.4 MG/DL (ref 8.3–10.6)
CALCIUM SERPL-MCNC: 8.4 MG/DL (ref 8.3–10.6)
CALCIUM SERPL-MCNC: 9.4 MG/DL (ref 8.3–10.6)
CHLORIDE BLD-SCNC: 101 MMOL/L (ref 99–110)
CHLORIDE BLD-SCNC: 102 MMOL/L (ref 99–110)
CHLORIDE BLD-SCNC: 104 MMOL/L (ref 99–110)
CHLORIDE BLD-SCNC: 98 MMOL/L (ref 99–110)
CHLORIDE BLD-SCNC: 98 MMOL/L (ref 99–110)
CHLORIDE BLD-SCNC: 99 MMOL/L (ref 99–110)
CLARITY: CLEAR
CO2: 14 MMOL/L (ref 21–32)
CO2: 16 MMOL/L (ref 21–32)
CO2: 17 MMOL/L (ref 21–32)
CO2: 17 MMOL/L (ref 21–32)
COLOR: YELLOW
CREAT SERPL-MCNC: 0.8 MG/DL (ref 0.9–1.3)
CREAT SERPL-MCNC: 0.9 MG/DL (ref 0.9–1.3)
CREAT SERPL-MCNC: 1 MG/DL (ref 0.9–1.3)
CREAT SERPL-MCNC: 1.4 MG/DL (ref 0.9–1.3)
CREAT SERPL-MCNC: 1.6 MG/DL (ref 0.9–1.3)
CREAT SERPL-MCNC: 1.6 MG/DL (ref 0.9–1.3)
EOSINOPHILS ABSOLUTE: 0 K/UL (ref 0–0.6)
EOSINOPHILS RELATIVE PERCENT: 0.2 %
ESTIMATED AVERAGE GLUCOSE: 240.3 MG/DL
GFR AFRICAN AMERICAN: 59
GFR AFRICAN AMERICAN: 59
GFR AFRICAN AMERICAN: >60
GFR NON-AFRICAN AMERICAN: 49
GFR NON-AFRICAN AMERICAN: 49
GFR NON-AFRICAN AMERICAN: 57
GFR NON-AFRICAN AMERICAN: >60
GLUCOSE BLD-MCNC: 130 MG/DL (ref 70–99)
GLUCOSE BLD-MCNC: 133 MG/DL (ref 70–99)
GLUCOSE BLD-MCNC: 138 MG/DL (ref 70–99)
GLUCOSE BLD-MCNC: 140 MG/DL (ref 70–99)
GLUCOSE BLD-MCNC: 143 MG/DL (ref 70–99)
GLUCOSE BLD-MCNC: 146 MG/DL (ref 70–99)
GLUCOSE BLD-MCNC: 150 MG/DL (ref 70–99)
GLUCOSE BLD-MCNC: 152 MG/DL (ref 70–99)
GLUCOSE BLD-MCNC: 155 MG/DL (ref 70–99)
GLUCOSE BLD-MCNC: 157 MG/DL (ref 70–99)
GLUCOSE BLD-MCNC: 162 MG/DL (ref 70–99)
GLUCOSE BLD-MCNC: 165 MG/DL (ref 70–99)
GLUCOSE BLD-MCNC: 166 MG/DL (ref 70–99)
GLUCOSE BLD-MCNC: 171 MG/DL (ref 70–99)
GLUCOSE BLD-MCNC: 171 MG/DL (ref 70–99)
GLUCOSE BLD-MCNC: 172 MG/DL (ref 70–99)
GLUCOSE BLD-MCNC: 175 MG/DL (ref 70–99)
GLUCOSE BLD-MCNC: 176 MG/DL (ref 70–99)
GLUCOSE BLD-MCNC: 212 MG/DL (ref 70–99)
GLUCOSE BLD-MCNC: 213 MG/DL (ref 70–99)
GLUCOSE BLD-MCNC: 215 MG/DL (ref 70–99)
GLUCOSE BLD-MCNC: 219 MG/DL (ref 70–99)
GLUCOSE BLD-MCNC: 223 MG/DL (ref 70–99)
GLUCOSE BLD-MCNC: 229 MG/DL (ref 70–99)
GLUCOSE BLD-MCNC: 231 MG/DL (ref 70–99)
GLUCOSE BLD-MCNC: 232 MG/DL (ref 70–99)
GLUCOSE BLD-MCNC: 253 MG/DL (ref 70–99)
GLUCOSE BLD-MCNC: 293 MG/DL (ref 70–99)
GLUCOSE URINE: 250 MG/DL
HBA1C MFR BLD: 10 %
HCT VFR BLD CALC: 39.4 % (ref 40.5–52.5)
HEMOGLOBIN: 12.9 G/DL (ref 13.5–17.5)
KETONES, URINE: ABNORMAL MG/DL
LEUKOCYTE ESTERASE, URINE: NEGATIVE
LIPASE: 55 U/L (ref 13–60)
LYMPHOCYTES ABSOLUTE: 1 K/UL (ref 1–5.1)
LYMPHOCYTES RELATIVE PERCENT: 15.5 %
MAGNESIUM: 1.7 MG/DL (ref 1.8–2.4)
MAGNESIUM: 1.8 MG/DL (ref 1.8–2.4)
MAGNESIUM: 1.8 MG/DL (ref 1.8–2.4)
MAGNESIUM: 1.9 MG/DL (ref 1.8–2.4)
MAGNESIUM: 2.1 MG/DL (ref 1.8–2.4)
MAGNESIUM: 2.2 MG/DL (ref 1.8–2.4)
MCH RBC QN AUTO: 30.8 PG (ref 26–34)
MCHC RBC AUTO-ENTMCNC: 32.8 G/DL (ref 31–36)
MCV RBC AUTO: 94 FL (ref 80–100)
MICROSCOPIC EXAMINATION: ABNORMAL
MONOCYTES ABSOLUTE: 0.6 K/UL (ref 0–1.3)
MONOCYTES RELATIVE PERCENT: 10.5 %
NEUTROPHILS ABSOLUTE: 4.5 K/UL (ref 1.7–7.7)
NEUTROPHILS RELATIVE PERCENT: 73.6 %
NITRITE, URINE: NEGATIVE
PDW BLD-RTO: 14.5 % (ref 12.4–15.4)
PERFORMED ON: ABNORMAL
PH UA: 5.5 (ref 5–8)
PHOSPHORUS: 1.6 MG/DL (ref 2.5–4.9)
PHOSPHORUS: 1.9 MG/DL (ref 2.5–4.9)
PHOSPHORUS: 2.2 MG/DL (ref 2.5–4.9)
PHOSPHORUS: 2.3 MG/DL (ref 2.5–4.9)
PHOSPHORUS: 2.4 MG/DL (ref 2.5–4.9)
PHOSPHORUS: 2.4 MG/DL (ref 2.5–4.9)
PLATELET # BLD: 215 K/UL (ref 135–450)
PMV BLD AUTO: 9 FL (ref 5–10.5)
POTASSIUM SERPL-SCNC: 3.3 MMOL/L (ref 3.5–5.1)
POTASSIUM SERPL-SCNC: 3.4 MMOL/L (ref 3.5–5.1)
POTASSIUM SERPL-SCNC: 3.5 MMOL/L (ref 3.5–5.1)
POTASSIUM SERPL-SCNC: 3.6 MMOL/L (ref 3.5–5.1)
PROTEIN UA: NEGATIVE MG/DL
RBC # BLD: 4.19 M/UL (ref 4.2–5.9)
SODIUM BLD-SCNC: 128 MMOL/L (ref 136–145)
SODIUM BLD-SCNC: 129 MMOL/L (ref 136–145)
SODIUM BLD-SCNC: 130 MMOL/L (ref 136–145)
SODIUM BLD-SCNC: 132 MMOL/L (ref 136–145)
SODIUM BLD-SCNC: 133 MMOL/L (ref 136–145)
SODIUM BLD-SCNC: 133 MMOL/L (ref 136–145)
SPECIFIC GRAVITY UA: >=1.03 (ref 1–1.03)
TROPONIN: <0.01 NG/ML
TROPONIN: <0.01 NG/ML
URINE REFLEX TO CULTURE: ABNORMAL
URINE TYPE: ABNORMAL
UROBILINOGEN, URINE: 0.2 E.U./DL
WBC # BLD: 6.2 K/UL (ref 4–11)

## 2020-05-11 PROCEDURE — 99232 SBSQ HOSP IP/OBS MODERATE 35: CPT | Performed by: INTERNAL MEDICINE

## 2020-05-11 PROCEDURE — 6370000000 HC RX 637 (ALT 250 FOR IP): Performed by: INTERNAL MEDICINE

## 2020-05-11 PROCEDURE — 83735 ASSAY OF MAGNESIUM: CPT

## 2020-05-11 PROCEDURE — 81003 URINALYSIS AUTO W/O SCOPE: CPT

## 2020-05-11 PROCEDURE — 6360000002 HC RX W HCPCS: Performed by: HOSPITALIST

## 2020-05-11 PROCEDURE — 2500000003 HC RX 250 WO HCPCS: Performed by: HOSPITALIST

## 2020-05-11 PROCEDURE — 2580000003 HC RX 258: Performed by: INTERNAL MEDICINE

## 2020-05-11 PROCEDURE — 2580000003 HC RX 258: Performed by: HOSPITALIST

## 2020-05-11 PROCEDURE — 36415 COLL VENOUS BLD VENIPUNCTURE: CPT

## 2020-05-11 PROCEDURE — 80048 BASIC METABOLIC PNL TOTAL CA: CPT

## 2020-05-11 PROCEDURE — 94761 N-INVAS EAR/PLS OXIMETRY MLT: CPT

## 2020-05-11 PROCEDURE — 2000000000 HC ICU R&B

## 2020-05-11 PROCEDURE — 83036 HEMOGLOBIN GLYCOSYLATED A1C: CPT

## 2020-05-11 PROCEDURE — 84484 ASSAY OF TROPONIN QUANT: CPT

## 2020-05-11 PROCEDURE — 6370000000 HC RX 637 (ALT 250 FOR IP): Performed by: HOSPITALIST

## 2020-05-11 PROCEDURE — 85025 COMPLETE CBC W/AUTO DIFF WBC: CPT

## 2020-05-11 PROCEDURE — 99291 CRITICAL CARE FIRST HOUR: CPT | Performed by: INTERNAL MEDICINE

## 2020-05-11 PROCEDURE — 84100 ASSAY OF PHOSPHORUS: CPT

## 2020-05-11 PROCEDURE — 6360000002 HC RX W HCPCS: Performed by: INTERNAL MEDICINE

## 2020-05-11 RX ORDER — ONDANSETRON 2 MG/ML
4 INJECTION INTRAMUSCULAR; INTRAVENOUS EVERY 6 HOURS PRN
Status: DISCONTINUED | OUTPATIENT
Start: 2020-05-11 | End: 2020-05-13 | Stop reason: HOSPADM

## 2020-05-11 RX ORDER — ATORVASTATIN CALCIUM 10 MG/1
10 TABLET, FILM COATED ORAL DAILY
Status: DISCONTINUED | OUTPATIENT
Start: 2020-05-11 | End: 2020-05-13 | Stop reason: HOSPADM

## 2020-05-11 RX ORDER — PREGABALIN 100 MG/1
300 CAPSULE ORAL 2 TIMES DAILY
Status: DISCONTINUED | OUTPATIENT
Start: 2020-05-11 | End: 2020-05-13 | Stop reason: HOSPADM

## 2020-05-11 RX ORDER — 0.9 % SODIUM CHLORIDE 0.9 %
1000 INTRAVENOUS SOLUTION INTRAVENOUS ONCE
Status: COMPLETED | OUTPATIENT
Start: 2020-05-11 | End: 2020-05-11

## 2020-05-11 RX ADMIN — HEPARIN SODIUM 5000 UNITS: 5000 INJECTION INTRAVENOUS; SUBCUTANEOUS at 00:01

## 2020-05-11 RX ADMIN — SODIUM PHOSPHATE, MONOBASIC, MONOHYDRATE 15 MMOL: 276; 142 INJECTION, SOLUTION INTRAVENOUS at 10:22

## 2020-05-11 RX ADMIN — PREGABALIN 300 MG: 100 CAPSULE ORAL at 12:36

## 2020-05-11 RX ADMIN — POTASSIUM CHLORIDE 10 MEQ: 7.46 INJECTION, SOLUTION INTRAVENOUS at 11:10

## 2020-05-11 RX ADMIN — ONDANSETRON HYDROCHLORIDE 4 MG: 2 INJECTION, SOLUTION INTRAMUSCULAR; INTRAVENOUS at 10:21

## 2020-05-11 RX ADMIN — ATORVASTATIN CALCIUM 10 MG: 10 TABLET, FILM COATED ORAL at 12:36

## 2020-05-11 RX ADMIN — DEXTROSE AND SODIUM CHLORIDE: 5; 450 INJECTION, SOLUTION INTRAVENOUS at 14:03

## 2020-05-11 RX ADMIN — POTASSIUM CHLORIDE 10 MEQ: 7.46 INJECTION, SOLUTION INTRAVENOUS at 18:02

## 2020-05-11 RX ADMIN — POTASSIUM CHLORIDE 10 MEQ: 7.46 INJECTION, SOLUTION INTRAVENOUS at 07:40

## 2020-05-11 RX ADMIN — POTASSIUM CHLORIDE 10 MEQ: 7.46 INJECTION, SOLUTION INTRAVENOUS at 14:43

## 2020-05-11 RX ADMIN — DULOXETINE HYDROCHLORIDE 60 MG: 60 CAPSULE, DELAYED RELEASE ORAL at 07:43

## 2020-05-11 RX ADMIN — POTASSIUM CHLORIDE 10 MEQ: 7.46 INJECTION, SOLUTION INTRAVENOUS at 04:15

## 2020-05-11 RX ADMIN — HYDROXYCHLOROQUINE SULFATE 200 MG: 200 TABLET, FILM COATED ORAL at 20:23

## 2020-05-11 RX ADMIN — HEPARIN SODIUM 5000 UNITS: 5000 INJECTION INTRAVENOUS; SUBCUTANEOUS at 06:47

## 2020-05-11 RX ADMIN — PANTOPRAZOLE SODIUM 40 MG: 40 TABLET, DELAYED RELEASE ORAL at 06:47

## 2020-05-11 RX ADMIN — POTASSIUM CHLORIDE 10 MEQ: 7.46 INJECTION, SOLUTION INTRAVENOUS at 09:48

## 2020-05-11 RX ADMIN — POTASSIUM CHLORIDE 10 MEQ: 7.46 INJECTION, SOLUTION INTRAVENOUS at 06:58

## 2020-05-11 RX ADMIN — MUPIROCIN: 20 OINTMENT TOPICAL at 09:48

## 2020-05-11 RX ADMIN — TAMSULOSIN HYDROCHLORIDE 0.4 MG: 0.4 CAPSULE ORAL at 00:00

## 2020-05-11 RX ADMIN — BUPROPION HYDROCHLORIDE 100 MG: 100 TABLET, EXTENDED RELEASE ORAL at 07:43

## 2020-05-11 RX ADMIN — TAMSULOSIN HYDROCHLORIDE 0.4 MG: 0.4 CAPSULE ORAL at 20:23

## 2020-05-11 RX ADMIN — SODIUM PHOSPHATE, MONOBASIC, MONOHYDRATE 15 MMOL: 276; 142 INJECTION, SOLUTION INTRAVENOUS at 23:45

## 2020-05-11 RX ADMIN — SODIUM PHOSPHATE, MONOBASIC, MONOHYDRATE 10 MMOL: 276; 142 INJECTION, SOLUTION INTRAVENOUS at 14:43

## 2020-05-11 RX ADMIN — PREGABALIN 300 MG: 100 CAPSULE ORAL at 20:23

## 2020-05-11 RX ADMIN — POTASSIUM CHLORIDE 10 MEQ: 7.46 INJECTION, SOLUTION INTRAVENOUS at 23:03

## 2020-05-11 RX ADMIN — SODIUM PHOSPHATE, MONOBASIC, MONOHYDRATE 10 MMOL: 276; 142 INJECTION, SOLUTION INTRAVENOUS at 19:11

## 2020-05-11 RX ADMIN — SODIUM PHOSPHATE, MONOBASIC, MONOHYDRATE 15 MMOL: 276; 142 INJECTION, SOLUTION INTRAVENOUS at 02:33

## 2020-05-11 RX ADMIN — SODIUM CHLORIDE 1000 ML: 9 INJECTION, SOLUTION INTRAVENOUS at 07:56

## 2020-05-11 RX ADMIN — POTASSIUM CHLORIDE 10 MEQ: 7.46 INJECTION, SOLUTION INTRAVENOUS at 05:55

## 2020-05-11 RX ADMIN — DEXTROSE AND SODIUM CHLORIDE: 5; 450 INJECTION, SOLUTION INTRAVENOUS at 01:15

## 2020-05-11 RX ADMIN — POTASSIUM CHLORIDE 10 MEQ: 7.46 INJECTION, SOLUTION INTRAVENOUS at 18:47

## 2020-05-11 RX ADMIN — POTASSIUM CHLORIDE 10 MEQ: 7.46 INJECTION, SOLUTION INTRAVENOUS at 13:53

## 2020-05-11 RX ADMIN — POTASSIUM CHLORIDE 10 MEQ: 7.46 INJECTION, SOLUTION INTRAVENOUS at 16:56

## 2020-05-11 RX ADMIN — DEXTROSE AND SODIUM CHLORIDE: 5; 450 INJECTION, SOLUTION INTRAVENOUS at 07:38

## 2020-05-11 RX ADMIN — HYDROXYCHLOROQUINE SULFATE 200 MG: 200 TABLET, FILM COATED ORAL at 00:00

## 2020-05-11 RX ADMIN — SODIUM PHOSPHATE, MONOBASIC, MONOHYDRATE 10 MMOL: 276; 142 INJECTION, SOLUTION INTRAVENOUS at 06:45

## 2020-05-11 RX ADMIN — POTASSIUM CHLORIDE 10 MEQ: 7.46 INJECTION, SOLUTION INTRAVENOUS at 02:22

## 2020-05-11 RX ADMIN — HYDROXYCHLOROQUINE SULFATE 200 MG: 200 TABLET, FILM COATED ORAL at 07:42

## 2020-05-11 RX ADMIN — POTASSIUM CHLORIDE 10 MEQ: 7.46 INJECTION, SOLUTION INTRAVENOUS at 21:21

## 2020-05-11 RX ADMIN — POTASSIUM CHLORIDE 10 MEQ: 7.46 INJECTION, SOLUTION INTRAVENOUS at 01:15

## 2020-05-11 RX ADMIN — POTASSIUM CHLORIDE 10 MEQ: 7.46 INJECTION, SOLUTION INTRAVENOUS at 15:57

## 2020-05-11 RX ADMIN — SODIUM CHLORIDE 7.76 UNITS/HR: 9 INJECTION, SOLUTION INTRAVENOUS at 08:25

## 2020-05-11 RX ADMIN — DEXTROSE AND SODIUM CHLORIDE: 5; 450 INJECTION, SOLUTION INTRAVENOUS at 21:23

## 2020-05-11 ASSESSMENT — PAIN DESCRIPTION - DESCRIPTORS: DESCRIPTORS: BURNING

## 2020-05-11 ASSESSMENT — PAIN SCALES - GENERAL
PAINLEVEL_OUTOF10: 5
PAINLEVEL_OUTOF10: 0

## 2020-05-11 ASSESSMENT — PAIN DESCRIPTION - FREQUENCY: FREQUENCY: CONTINUOUS

## 2020-05-11 ASSESSMENT — PAIN - FUNCTIONAL ASSESSMENT: PAIN_FUNCTIONAL_ASSESSMENT: ACTIVITIES ARE NOT PREVENTED

## 2020-05-11 ASSESSMENT — PAIN DESCRIPTION - PAIN TYPE: TYPE: ACUTE PAIN

## 2020-05-11 ASSESSMENT — PAIN DESCRIPTION - LOCATION: LOCATION: ARM

## 2020-05-11 ASSESSMENT — PAIN DESCRIPTION - ONSET: ONSET: SUDDEN

## 2020-05-11 ASSESSMENT — PAIN DESCRIPTION - PROGRESSION: CLINICAL_PROGRESSION: GRADUALLY WORSENING

## 2020-05-11 ASSESSMENT — PAIN DESCRIPTION - ORIENTATION: ORIENTATION: LEFT

## 2020-05-11 NOTE — H&P
deformity. Pupils equal, round, and reactive to light. Extra ocular muscles intact. Conjunctivae/corneas clear. Neck: Supple, with full range of motion. No jugular venous distention. Trachea midline. Respiratory:  Normal respiratory effort. Clear to auscultation, bilaterally without Rales/Wheezes/Rhonchi. Cardiovascular:  Regular rate and rhythm with normal S1/S2 without murmurs, rubs or gallops. Abdomen: Soft, non-tender, non-distended with normal bowel sounds. Musculoskeletal:  No clubbing, cyanosis or edema bilaterally. Full range of motion without deformity. Skin: Skin color, texture, turgor normal.  No rashes or lesions. Neurologic:  Neurovascularly intact without any focal sensory/motor deficits.  Cranial nerves: II-XII intact, grossly non-focal.  Psychiatric:  Alert and oriented, thought content appropriate, normal insight  Capillary Refill: Brisk,< 3 seconds   Peripheral Pulses: +2 palpable, equal bilaterally       Labs:     Recent Labs     05/10/20  1841   WBC 10.2   HGB 14.5   HCT 45.8        Recent Labs     05/10/20  1841 05/10/20  2354   * 132*   K 5.2* 3.6   CL 83* 98*   CO2 6* 14*   BUN 42* 30*   CREATININE 2.5* 1.6*   CALCIUM 9.3 9.4   PHOS  --  1.6*     Recent Labs     05/10/20  1841   AST 7*   ALT 12   BILITOT 0.5   ALKPHOS 106     Recent Labs     05/10/20  1841   INR 1.01     Recent Labs     05/10/20  1841 05/10/20  2354   TROPONINI <0.01 <0.01       Urinalysis:      Lab Results   Component Value Date    NITRU Negative 02/20/2017    WBCUA 0-2 02/26/2016    BACTERIA Rare 02/26/2016    RBCUA None seen 02/26/2016    BLOODU Negative 02/20/2017    SPECGRAV 1.025 02/20/2017    GLUCOSEU >=1000 02/20/2017    GLUCOSEU NEGATIVE 02/23/2011       Radiology:          XR CHEST PORTABLE   Final Result   No evidence of acute cardiopulmonary disease             ASSESSMENT:    Active Hospital Problems    Diagnosis Date Noted    DKA, type 2, not at goal [facility]) [E11.10] 05/10/2020

## 2020-05-11 NOTE — PROGRESS NOTES
Report given to Fortino Mejia RN at bedside for transfer of care. Pt denies needs at this time, call light within reach.    Glory Plunkett RN

## 2020-05-11 NOTE — PROGRESS NOTES
Pulmonary & Critical Care Medicine ICU Progress Note    CC: DKA    Events of Last 24 hours: Insulin drip 5.3 units/hr   D51/2NS   Feels nauseated and weak       Invasive Lines: IV: PIVs          / / /   No results for input(s): PHART, YWE2VSJ, PO2ART in the last 72 hours. IV:   dextrose      sodium chloride 250 mL/hr at 05/10/20 2257    dextrose 5 % and 0.45 % NaCl 150 mL/hr at 20 0115    insulin 9.28 Units/hr (20 0700)       Vitals:  Blood pressure (!) 94/48, pulse 112, temperature 98.1 °F (36.7 °C), temperature source Oral, resp. rate 20, height 5' 9\" (1.753 m), weight 160 lb (72.6 kg), SpO2 96 %. on RA   Temp  Av.1 °F (36.7 °C)  Min: 97.5 °F (36.4 °C)  Max: 98.6 °F (37 °C)    Intake/Output Summary (Last 24 hours) at 2020 0721  Last data filed at 2020 0500  Gross per 24 hour   Intake 4265 ml   Output 3325 ml   Net 940 ml     EXAM:  General: ill appearing    Eyes: PERRL. No sclera icterus. No conjunctival injection. ENT: No discharge. Pharynx clear. Neck: Trachea midline. Normal thyroid. Resp: No accessory muscle use. No crackles. No wheezing. No rhonchi. No dullness on percussion. CV: Regular rate. Regular rhythm. No mumur or rub. No edema. GI: Non-tender. Non-distended. No masses. No organomegaly. Normal bowel sounds. No hernia. Skin: Warm and dry. No nodule on exposed extremities. No rash on exposed extremities. Lymph: No cervical LAD. No supraclavicular LAD. M/S: No cyanosis. No joint deformity. No clubbing. Neuro: AAOx3. Followed commands.    Psych: No agitation, no anxiety, affect is full     Scheduled Meds:   buPROPion  100 mg Oral Daily    DULoxetine  60 mg Oral Daily    hydroxychloroquine  200 mg Oral BID    pantoprazole  40 mg Oral QAM AC    tamsulosin  0.4 mg Oral Nightly    heparin (porcine)  5,000 Units Subcutaneous 3 times per day     PRN Meds:  glucose, glucagon (rDNA), dextrose, HYDROcodone-acetaminophen, LORazepam, tiZANidine, dextrose, potassium chloride, magnesium sulfate, sodium phosphate IVPB **OR** sodium phosphate IVPB **OR** sodium phosphate IVPB, dextrose 5 % and 0.45 % NaCl    Results:  CBC:   Recent Labs     05/10/20  1841 05/11/20  0426   WBC 10.2 6.2   HGB 14.5 12.9*   HCT 45.8 39.4*   MCV 99.0 94.0    215     BMP:   Recent Labs     05/10/20  1841 05/10/20  2354 05/11/20  0426   * 132* 133*   K 5.2* 3.6 3.5   CL 83* 98* 102   CO2 6* 14* 16*   PHOS  --  1.6* 2.4*   BUN 42* 30* 30*   CREATININE 2.5* 1.6* 1.6*     LIVER PROFILE:   Recent Labs     05/10/20  1841 05/10/20  2354   AST 7*  --    ALT 12  --    LIPASE 55.0 55.0   BILITOT 0.5  --    ALKPHOS 106  --        Cultures:  None    Films:  CXR 5/10 was reviewed by me and it showed   No acute cardiopulmonary disease    ASSESSMENT:  · Diabetic ketoacidosis  · Electrolytes disorder   · Anion wang metabolic acidosis  · Acute renal failure     PLAN:   Supplemental oxygen to maintain SaO2 >92%; wean as tolerated   Closely monitory airways, clinical status, cardiac rhythm, vital signs, and urine output   · IVF resuscitation, electrolytes repletion, electrolytes and glucose monitoring and insulin drip are per DKA protocol   · IV hydration with NS  · Cardiac monitor and pulse oximtery  · No indication for Bicarbonate  · Monitor off Abx   · Diabetic education. · Hold Lisinopril   · Resume Lyrica, flomax, nd statin   · DVT prophylaxis: Lovenox  · MRSA prophylaxis: Bactroban            Total critical care time caring for this patient with life threatening, unstable organ failure, including direct patient contact, management of life support systems, review of data including imaging and labs, discussions with other team members and physicians is 32 minutes so far today, excluding procedures.

## 2020-05-11 NOTE — PROGRESS NOTES
4 Eyes Skin Assessment     The patient is being assess for   Admission    I agree that 2 RN's have performed a thorough Head to Toe Skin Assessment on the patient. ALL assessment sites listed below have been assessed. Areas assessed by both nurses:   [x]   Head, Face, and Ears   [x]   Shoulders, Back, and Chest, Abdomen  [x]   Arms, Elbows, and Hands   [x]   Coccyx, Sacrum, and Ischium  [x]   Legs, Feet, and Heels        No skin issues noted at this time. **SHARE this note so that the co-signing nurse is able to place an eSignature**    Co-signer eSignature: Electronically signed by Elaina Raymundo RN on 5/11/20 at 7:52 AM EDT    Does the Patient have Skin Breakdown?   No          Shivam Prevention initiated:  No   Wound Care Orders initiated:  N/A      WOC nurse consulted for Pressure Injury (Stage 3,4, Unstageable, DTI, NWPT, Complex wounds)and New or Established Ostomies:  NA      Primary Nurse eSignature: Electronically signed by Sunshine Vazquez RN on 5/11/20 at 7:27 AM EDT

## 2020-05-11 NOTE — ED NOTES
Joss sent to Dr. Senthil Ryan at [facility]  05/10/20 2011    Gusve completed with a call back from Dr. Senthil Ryan at 2035     [address] [place] [address]  05/10/20 2035

## 2020-05-11 NOTE — CONSULTS
Musculoskeletal: Negative for arthralgias   Skin: Negative for rash  Neurological: Negative for syncope  Hematological: Negative for adenopathy  Psychiatric/Behavorial: Negative for anxiety    PHYSICAL EXAM: BP (!) 142/82   Pulse 113   Temp 97.8 °F (36.6 °C) (Oral)   Resp 21   Ht 5' 9\" (1.753 m)   Wt 160 lb (72.6 kg)   SpO2 99%   BMI 23.63 kg/m²  on ra  Constitutional:  No acute distress. Eyes: PERRL. Conjunctivae anicteric. ENT: Normal nose. Normal tongue. Neck:  Trachea is midline. No thyroid tenderness. Respiratory: No accessory muscle usage. No wheezes. No rales. No Rhonchi. Cardiovascular: Normal S1S2. No digit clubbing. No digit cyanosis. No LE edema. Skin: No rash on the exposed extremities. No Nodules or induration on exposed extremities. Psychiatric: No anxiety or Agitation. Alert and Oriented to person, place and time. Auscultation per electronic stethoscope from [address]. CBC:   Recent Labs     05/10/20  1841   WBC 10.2   HGB 14.5   HCT 45.8   MCV 99.0        BMP:   Recent Labs     05/10/20  1841   *   K 5.2*   CL 83*   CO2 6*   BUN 42*   CREATININE 2.5*        Recent Labs     05/10/20  1841   AST 7*   ALT 12   LIPASE 55.0   BILITOT 0.5   ALKPHOS 106     Recent Labs     05/10/20  1841   PROTIME 11.7   INR 1.01     No results for input(s): NITRITE, COLORU, PHUR, LABCAST, WBCUA, RBCUA, MUCUS, TRICHOMONAS, YEAST, BACTERIA, CLARITYU, SPECGRAV, LEUKOCYTESUR, UROBILINOGEN, BILIRUBINUR, BLOODU, GLUCOSEU, AMORPHOUS in the last 72 hours. Invalid input(s): KETONESU  No results for input(s): PHART, WVP2TOT, PO2ART in the last 72 hours. Chest imaging was reviewed by me and showed  A CXR with clear lungs    ASSESSMENT:  Diabetic ketoacidosis  Electrolytes disorder   Anion wang metabolic acidosis  N/V    PLAN:  DKA protocol with IVF resuscitation, electrolytes repletion, and intensive glucose monitoring   Zofran for n/v  Diabetic education.    DVT prophylaxis: Lovenox  MRSA prophylaxis: Bactroban  Due to at least single organ failure and risk of rapid deterioration, I spent 31 minutes of Critical care time reviewing labs/films, examining patient, collaborating with other physicians. This does not include time performing critical procedures. THIS VISIT WAS COMPLETED VIRTUALLY  Pursuant to the emergency declaration under the [address] [place] [address], [facility] waiver authority and the [employer] Act, this Virtual  Visit was conducted, with patient's consent  Services were provided through a video synchronous discussion virtually to substitute for in-person hospital visit.       Thank you Giovanni Lanier DO for this consult

## 2020-05-11 NOTE — PROGRESS NOTES
Pt with dry heaves and C/O continuous nausea. Message sent to MD for order for zofran.   Zeinab Plunkett RN

## 2020-05-12 LAB
ANION GAP SERPL CALCULATED.3IONS-SCNC: 12 MMOL/L (ref 3–16)
ANION GAP SERPL CALCULATED.3IONS-SCNC: 13 MMOL/L (ref 3–16)
ANION GAP SERPL CALCULATED.3IONS-SCNC: 16 MMOL/L (ref 3–16)
BUN BLDV-MCNC: 11 MG/DL (ref 7–20)
BUN BLDV-MCNC: 6 MG/DL (ref 7–20)
BUN BLDV-MCNC: 9 MG/DL (ref 7–20)
CALCIUM SERPL-MCNC: 8.1 MG/DL (ref 8.3–10.6)
CALCIUM SERPL-MCNC: 8.3 MG/DL (ref 8.3–10.6)
CALCIUM SERPL-MCNC: 8.7 MG/DL (ref 8.3–10.6)
CHLORIDE BLD-SCNC: 100 MMOL/L (ref 99–110)
CHLORIDE BLD-SCNC: 94 MMOL/L (ref 99–110)
CHLORIDE BLD-SCNC: 99 MMOL/L (ref 99–110)
CO2: 20 MMOL/L (ref 21–32)
CO2: 20 MMOL/L (ref 21–32)
CO2: 22 MMOL/L (ref 21–32)
CREAT SERPL-MCNC: 0.7 MG/DL (ref 0.9–1.3)
ESTIMATED AVERAGE GLUCOSE: 240.3 MG/DL
GFR AFRICAN AMERICAN: >60
GFR NON-AFRICAN AMERICAN: >60
GLUCOSE BLD-MCNC: 136 MG/DL (ref 70–99)
GLUCOSE BLD-MCNC: 137 MG/DL (ref 70–99)
GLUCOSE BLD-MCNC: 148 MG/DL (ref 70–99)
GLUCOSE BLD-MCNC: 151 MG/DL (ref 70–99)
GLUCOSE BLD-MCNC: 154 MG/DL (ref 70–99)
GLUCOSE BLD-MCNC: 154 MG/DL (ref 70–99)
GLUCOSE BLD-MCNC: 155 MG/DL (ref 70–99)
GLUCOSE BLD-MCNC: 164 MG/DL (ref 70–99)
GLUCOSE BLD-MCNC: 168 MG/DL (ref 70–99)
GLUCOSE BLD-MCNC: 168 MG/DL (ref 70–99)
GLUCOSE BLD-MCNC: 170 MG/DL (ref 70–99)
GLUCOSE BLD-MCNC: 255 MG/DL (ref 70–99)
GLUCOSE BLD-MCNC: 281 MG/DL (ref 70–99)
GLUCOSE BLD-MCNC: 327 MG/DL (ref 70–99)
GLUCOSE BLD-MCNC: 367 MG/DL (ref 70–99)
HBA1C MFR BLD: 10 %
MAGNESIUM: 1.7 MG/DL (ref 1.8–2.4)
MAGNESIUM: 1.7 MG/DL (ref 1.8–2.4)
PERFORMED ON: ABNORMAL
PHOSPHORUS: 2.7 MG/DL (ref 2.5–4.9)
PHOSPHORUS: 2.7 MG/DL (ref 2.5–4.9)
POTASSIUM SERPL-SCNC: 3.3 MMOL/L (ref 3.5–5.1)
POTASSIUM SERPL-SCNC: 3.3 MMOL/L (ref 3.5–5.1)
POTASSIUM SERPL-SCNC: 3.7 MMOL/L (ref 3.5–5.1)
SODIUM BLD-SCNC: 131 MMOL/L (ref 136–145)
SODIUM BLD-SCNC: 132 MMOL/L (ref 136–145)
SODIUM BLD-SCNC: 133 MMOL/L (ref 136–145)

## 2020-05-12 PROCEDURE — 2580000003 HC RX 258: Performed by: HOSPITALIST

## 2020-05-12 PROCEDURE — 83735 ASSAY OF MAGNESIUM: CPT

## 2020-05-12 PROCEDURE — 6370000000 HC RX 637 (ALT 250 FOR IP): Performed by: HOSPITALIST

## 2020-05-12 PROCEDURE — 36415 COLL VENOUS BLD VENIPUNCTURE: CPT

## 2020-05-12 PROCEDURE — 6370000000 HC RX 637 (ALT 250 FOR IP): Performed by: INTERNAL MEDICINE

## 2020-05-12 PROCEDURE — 94761 N-INVAS EAR/PLS OXIMETRY MLT: CPT

## 2020-05-12 PROCEDURE — 99233 SBSQ HOSP IP/OBS HIGH 50: CPT | Performed by: INTERNAL MEDICINE

## 2020-05-12 PROCEDURE — 1200000000 HC SEMI PRIVATE

## 2020-05-12 PROCEDURE — 80048 BASIC METABOLIC PNL TOTAL CA: CPT

## 2020-05-12 PROCEDURE — 2500000003 HC RX 250 WO HCPCS: Performed by: HOSPITALIST

## 2020-05-12 PROCEDURE — 6360000002 HC RX W HCPCS: Performed by: INTERNAL MEDICINE

## 2020-05-12 PROCEDURE — 99232 SBSQ HOSP IP/OBS MODERATE 35: CPT | Performed by: INTERNAL MEDICINE

## 2020-05-12 PROCEDURE — 6360000002 HC RX W HCPCS: Performed by: HOSPITALIST

## 2020-05-12 PROCEDURE — 84100 ASSAY OF PHOSPHORUS: CPT

## 2020-05-12 RX ORDER — DEXTROSE MONOHYDRATE 50 MG/ML
100 INJECTION, SOLUTION INTRAVENOUS PRN
Status: DISCONTINUED | OUTPATIENT
Start: 2020-05-12 | End: 2020-05-13 | Stop reason: HOSPADM

## 2020-05-12 RX ORDER — MAGNESIUM SULFATE IN WATER 40 MG/ML
2 INJECTION, SOLUTION INTRAVENOUS ONCE
Status: COMPLETED | OUTPATIENT
Start: 2020-05-12 | End: 2020-05-12

## 2020-05-12 RX ORDER — INSULIN GLARGINE 100 [IU]/ML
35 INJECTION, SOLUTION SUBCUTANEOUS 2 TIMES DAILY
Status: DISCONTINUED | OUTPATIENT
Start: 2020-05-12 | End: 2020-05-12

## 2020-05-12 RX ORDER — INSULIN GLARGINE 100 [IU]/ML
40 INJECTION, SOLUTION SUBCUTANEOUS 2 TIMES DAILY
Status: DISCONTINUED | OUTPATIENT
Start: 2020-05-12 | End: 2020-05-13 | Stop reason: HOSPADM

## 2020-05-12 RX ORDER — NICOTINE POLACRILEX 4 MG
15 LOZENGE BUCCAL PRN
Status: DISCONTINUED | OUTPATIENT
Start: 2020-05-12 | End: 2020-05-13 | Stop reason: HOSPADM

## 2020-05-12 RX ORDER — POTASSIUM CHLORIDE 750 MG/1
40 TABLET, EXTENDED RELEASE ORAL ONCE
Status: COMPLETED | OUTPATIENT
Start: 2020-05-12 | End: 2020-05-12

## 2020-05-12 RX ORDER — DEXTROSE MONOHYDRATE 25 G/50ML
12.5 INJECTION, SOLUTION INTRAVENOUS PRN
Status: DISCONTINUED | OUTPATIENT
Start: 2020-05-12 | End: 2020-05-13 | Stop reason: HOSPADM

## 2020-05-12 RX ADMIN — PREGABALIN 300 MG: 100 CAPSULE ORAL at 08:03

## 2020-05-12 RX ADMIN — TAMSULOSIN HYDROCHLORIDE 0.4 MG: 0.4 CAPSULE ORAL at 20:51

## 2020-05-12 RX ADMIN — INSULIN LISPRO 2 UNITS: 100 INJECTION, SOLUTION INTRAVENOUS; SUBCUTANEOUS at 20:52

## 2020-05-12 RX ADMIN — PREGABALIN 300 MG: 100 CAPSULE ORAL at 20:50

## 2020-05-12 RX ADMIN — INSULIN GLARGINE 40 UNITS: 100 INJECTION, SOLUTION SUBCUTANEOUS at 20:52

## 2020-05-12 RX ADMIN — MUPIROCIN: 20 OINTMENT TOPICAL at 20:51

## 2020-05-12 RX ADMIN — ATORVASTATIN CALCIUM 10 MG: 10 TABLET, FILM COATED ORAL at 08:03

## 2020-05-12 RX ADMIN — PANTOPRAZOLE SODIUM 40 MG: 40 TABLET, DELAYED RELEASE ORAL at 06:33

## 2020-05-12 RX ADMIN — INSULIN LISPRO 3 UNITS: 100 INJECTION, SOLUTION INTRAVENOUS; SUBCUTANEOUS at 11:32

## 2020-05-12 RX ADMIN — HYDROXYCHLOROQUINE SULFATE 200 MG: 200 TABLET, FILM COATED ORAL at 08:03

## 2020-05-12 RX ADMIN — DULOXETINE HYDROCHLORIDE 60 MG: 60 CAPSULE, DELAYED RELEASE ORAL at 08:03

## 2020-05-12 RX ADMIN — POTASSIUM CHLORIDE 10 MEQ: 7.46 INJECTION, SOLUTION INTRAVENOUS at 06:34

## 2020-05-12 RX ADMIN — INSULIN LISPRO 4 UNITS: 100 INJECTION, SOLUTION INTRAVENOUS; SUBCUTANEOUS at 16:10

## 2020-05-12 RX ADMIN — BUPROPION HYDROCHLORIDE 100 MG: 100 TABLET, EXTENDED RELEASE ORAL at 08:03

## 2020-05-12 RX ADMIN — INSULIN GLARGINE 35 UNITS: 100 INJECTION, SOLUTION SUBCUTANEOUS at 08:46

## 2020-05-12 RX ADMIN — POTASSIUM CHLORIDE 10 MEQ: 7.46 INJECTION, SOLUTION INTRAVENOUS at 00:06

## 2020-05-12 RX ADMIN — HYDROXYCHLOROQUINE SULFATE 200 MG: 200 TABLET, FILM COATED ORAL at 20:50

## 2020-05-12 RX ADMIN — HYDROCODONE BITARTRATE AND ACETAMINOPHEN 1 TABLET: 10; 325 TABLET ORAL at 20:50

## 2020-05-12 RX ADMIN — POTASSIUM CHLORIDE 10 MEQ: 7.46 INJECTION, SOLUTION INTRAVENOUS at 08:04

## 2020-05-12 RX ADMIN — LISINOPRIL 30 MG: 20 TABLET ORAL at 11:49

## 2020-05-12 RX ADMIN — SODIUM PHOSPHATE, MONOBASIC, MONOHYDRATE 10 MMOL: 276; 142 INJECTION, SOLUTION INTRAVENOUS at 06:38

## 2020-05-12 RX ADMIN — POTASSIUM CHLORIDE 40 MEQ: 750 TABLET, EXTENDED RELEASE ORAL at 11:33

## 2020-05-12 RX ADMIN — DEXTROSE AND SODIUM CHLORIDE: 5; 450 INJECTION, SOLUTION INTRAVENOUS at 03:48

## 2020-05-12 RX ADMIN — TIZANIDINE 4 MG: 4 TABLET ORAL at 20:50

## 2020-05-12 RX ADMIN — POTASSIUM CHLORIDE 10 MEQ: 7.46 INJECTION, SOLUTION INTRAVENOUS at 01:24

## 2020-05-12 RX ADMIN — INSULIN LISPRO 10 UNITS: 100 INJECTION, SOLUTION INTRAVENOUS; SUBCUTANEOUS at 16:15

## 2020-05-12 RX ADMIN — ENOXAPARIN SODIUM 40 MG: 40 INJECTION SUBCUTANEOUS at 08:03

## 2020-05-12 RX ADMIN — MAGNESIUM SULFATE HEPTAHYDRATE 2 G: 40 INJECTION, SOLUTION INTRAVENOUS at 11:32

## 2020-05-12 ASSESSMENT — PAIN SCALES - GENERAL
PAINLEVEL_OUTOF10: 0
PAINLEVEL_OUTOF10: 8
PAINLEVEL_OUTOF10: 0
PAINLEVEL_OUTOF10: 0

## 2020-05-12 ASSESSMENT — PAIN DESCRIPTION - ONSET: ONSET: SUDDEN

## 2020-05-12 ASSESSMENT — PAIN - FUNCTIONAL ASSESSMENT: PAIN_FUNCTIONAL_ASSESSMENT: ACTIVITIES ARE NOT PREVENTED

## 2020-05-12 ASSESSMENT — PAIN DESCRIPTION - PROGRESSION: CLINICAL_PROGRESSION: GRADUALLY WORSENING

## 2020-05-12 ASSESSMENT — PAIN DESCRIPTION - FREQUENCY: FREQUENCY: CONTINUOUS

## 2020-05-12 ASSESSMENT — PAIN DESCRIPTION - LOCATION: LOCATION: BACK

## 2020-05-12 ASSESSMENT — PAIN DESCRIPTION - PAIN TYPE: TYPE: CHRONIC PAIN

## 2020-05-12 ASSESSMENT — PAIN DESCRIPTION - ORIENTATION: ORIENTATION: LEFT

## 2020-05-12 ASSESSMENT — PAIN DESCRIPTION - DESCRIPTORS: DESCRIPTORS: DISCOMFORT;ACHING

## 2020-05-12 NOTE — PROGRESS NOTES
more details.     Electronically signed by Kartik Dumont RD, DAISY on 5/12/20 at 3:56 PM EDT    Contact Number: [phone]

## 2020-05-12 NOTE — PROGRESS NOTES
Report given to RULA Milton at bedside for transfer of care. Pt transferred to portable monitor. Meds given to RN. Pt denies needs at this time, call light within reach. Request for transport placed.   Evangelist Plunkett RN

## 2020-05-12 NOTE — PROGRESS NOTES
Dr Jamey Castanon in to see pt. Discussed new insulin regime. Pt with HR in 130s at this time. Per Dr. Jamey Castanon, transfer to  with tele and continue to monitor.   Evangelist Plunkett RN

## 2020-05-12 NOTE — PROGRESS NOTES
HYDROcodone-acetaminophen, LORazepam, tiZANidine, dextrose, potassium chloride, magnesium sulfate, sodium phosphate IVPB **OR** sodium phosphate IVPB **OR** sodium phosphate IVPB, dextrose 5 % and 0.45 % NaCl    Results:  CBC:   Recent Labs     05/10/20  1841 05/11/20  0426   WBC 10.2 6.2   HGB 14.5 12.9*   HCT 45.8 39.4*   MCV 99.0 94.0    215     BMP:   Recent Labs     05/11/20 2037 05/12/20  0030 05/12/20  0519   * 131* 133*   K 3.4* 3.3* 3.3*   CL 99 99 100   CO2 17* 20* 20*   PHOS 1.9* 2.7 2.7   BUN 14 11 9   CREATININE 0.8* 0.7* 0.7*     LIVER PROFILE:   Recent Labs     05/10/20  1841 05/10/20  2354   AST 7*  --    ALT 12  --    LIPASE 55.0 55.0   BILITOT 0.5  --    ALKPHOS 106  --        Cultures:  None    Films:  CXR 5/10 was reviewed by me and it showed   No acute cardiopulmonary disease    ASSESSMENT:  · Diabetic ketoacidosis  · Electrolytes disorder   · Anion wang metabolic acidosis  · Acute renal failure   · Uncontrolled HTN     PLAN:   Supplemental oxygen to maintain SaO2 >92%; wean as tolerated  · Lantus and ISS   · Monitor off Abx   · Electrolytes replacement   · Diabetic education.    · Resume Lisinopril   · Resumed Lyrica, flomax, nd statin   · DVT prophylaxis: Lovenox  · MRSA prophylaxis: Bactroban  · Okay to move out of the ICU from our perspective

## 2020-05-12 NOTE — PROGRESS NOTES
End of shift note. Pt on insulin gtt all night. Replacements given with each lab draw. No other needs noted. Bedside report given to [facility]. POC transferred.  Patric Seo RN

## 2020-05-12 NOTE — FLOWSHEET NOTE
called Pt's room for telephonic  visit. Listened and offered emotional support. Pt in good spirits and coping well. No other needs at this time. Assured him of our continued availability for support. [address] [facility]       05/12/20 1522   Encounter Summary   Services provided to: Patient   Referral/Consult From: Sol[facility]   Continue Visiting   (5/12 initial call, no needs)   Complexity of Encounter Low   Length of Encounter 15 minutes   Spiritual Assessment Completed Yes   Routine   Type Initial   Assessment Calm; Approachable;Coping   Intervention Active listening;Explored feelings, thoughts, concerns; Discussed illness/injury and it's impact   Outcome Expressed gratitude;Engaged in conversation;Expressed feelings/needs/concerns

## 2020-05-13 VITALS
HEART RATE: 95 BPM | TEMPERATURE: 97 F | WEIGHT: 189.3 LBS | OXYGEN SATURATION: 98 % | BODY MASS INDEX: 28.04 KG/M2 | SYSTOLIC BLOOD PRESSURE: 128 MMHG | HEIGHT: 69 IN | DIASTOLIC BLOOD PRESSURE: 81 MMHG | RESPIRATION RATE: 16 BRPM

## 2020-05-13 LAB
ANION GAP SERPL CALCULATED.3IONS-SCNC: 14 MMOL/L (ref 3–16)
BUN BLDV-MCNC: 11 MG/DL (ref 7–20)
CALCIUM SERPL-MCNC: 9.1 MG/DL (ref 8.3–10.6)
CHLORIDE BLD-SCNC: 95 MMOL/L (ref 99–110)
CO2: 30 MMOL/L (ref 21–32)
CREAT SERPL-MCNC: 0.6 MG/DL (ref 0.9–1.3)
GFR AFRICAN AMERICAN: >60
GFR NON-AFRICAN AMERICAN: >60
GLUCOSE BLD-MCNC: 130 MG/DL (ref 70–99)
GLUCOSE BLD-MCNC: 190 MG/DL (ref 70–99)
GLUCOSE BLD-MCNC: 214 MG/DL (ref 70–99)
GLUCOSE BLD-MCNC: 95 MG/DL (ref 70–99)
MAGNESIUM: 2.2 MG/DL (ref 1.8–2.4)
PERFORMED ON: ABNORMAL
PERFORMED ON: ABNORMAL
PERFORMED ON: NORMAL
PHOSPHORUS: 3.7 MG/DL (ref 2.5–4.9)
POTASSIUM SERPL-SCNC: 3.2 MMOL/L (ref 3.5–5.1)
SODIUM BLD-SCNC: 139 MMOL/L (ref 136–145)

## 2020-05-13 PROCEDURE — 6360000002 HC RX W HCPCS: Performed by: INTERNAL MEDICINE

## 2020-05-13 PROCEDURE — 6370000000 HC RX 637 (ALT 250 FOR IP): Performed by: INTERNAL MEDICINE

## 2020-05-13 PROCEDURE — 80048 BASIC METABOLIC PNL TOTAL CA: CPT

## 2020-05-13 PROCEDURE — 99232 SBSQ HOSP IP/OBS MODERATE 35: CPT | Performed by: INTERNAL MEDICINE

## 2020-05-13 PROCEDURE — 83735 ASSAY OF MAGNESIUM: CPT

## 2020-05-13 PROCEDURE — 84100 ASSAY OF PHOSPHORUS: CPT

## 2020-05-13 PROCEDURE — 36415 COLL VENOUS BLD VENIPUNCTURE: CPT

## 2020-05-13 PROCEDURE — 99238 HOSP IP/OBS DSCHRG MGMT 30/<: CPT | Performed by: INTERNAL MEDICINE

## 2020-05-13 RX ORDER — POTASSIUM CHLORIDE 20 MEQ/1
40 TABLET, EXTENDED RELEASE ORAL ONCE
Status: COMPLETED | OUTPATIENT
Start: 2020-05-13 | End: 2020-05-13

## 2020-05-13 RX ADMIN — PANTOPRAZOLE SODIUM 40 MG: 40 TABLET, DELAYED RELEASE ORAL at 05:59

## 2020-05-13 RX ADMIN — HYDROCODONE BITARTRATE AND ACETAMINOPHEN 1 TABLET: 10; 325 TABLET ORAL at 08:23

## 2020-05-13 RX ADMIN — BUPROPION HYDROCHLORIDE 100 MG: 100 TABLET, EXTENDED RELEASE ORAL at 08:29

## 2020-05-13 RX ADMIN — DULOXETINE HYDROCHLORIDE 60 MG: 60 CAPSULE, DELAYED RELEASE ORAL at 08:23

## 2020-05-13 RX ADMIN — TIZANIDINE 4 MG: 4 TABLET ORAL at 08:24

## 2020-05-13 RX ADMIN — HYDROXYCHLOROQUINE SULFATE 200 MG: 200 TABLET, FILM COATED ORAL at 08:23

## 2020-05-13 RX ADMIN — INSULIN GLARGINE 40 UNITS: 100 INJECTION, SOLUTION SUBCUTANEOUS at 11:21

## 2020-05-13 RX ADMIN — INSULIN LISPRO 10 UNITS: 100 INJECTION, SOLUTION INTRAVENOUS; SUBCUTANEOUS at 08:31

## 2020-05-13 RX ADMIN — POTASSIUM CHLORIDE 40 MEQ: 1500 TABLET, EXTENDED RELEASE ORAL at 11:21

## 2020-05-13 RX ADMIN — LISINOPRIL 30 MG: 20 TABLET ORAL at 08:24

## 2020-05-13 RX ADMIN — ATORVASTATIN CALCIUM 10 MG: 10 TABLET, FILM COATED ORAL at 08:24

## 2020-05-13 RX ADMIN — ENOXAPARIN SODIUM 40 MG: 40 INJECTION SUBCUTANEOUS at 08:23

## 2020-05-13 RX ADMIN — PREGABALIN 300 MG: 100 CAPSULE ORAL at 08:23

## 2020-05-13 ASSESSMENT — PAIN DESCRIPTION - PAIN TYPE: TYPE: CHRONIC PAIN

## 2020-05-13 ASSESSMENT — PAIN SCALES - GENERAL
PAINLEVEL_OUTOF10: 8
PAINLEVEL_OUTOF10: 8

## 2020-05-13 ASSESSMENT — PAIN DESCRIPTION - LOCATION: LOCATION: HIP

## 2020-05-13 ASSESSMENT — PAIN DESCRIPTION - FREQUENCY: FREQUENCY: CONTINUOUS

## 2020-05-13 ASSESSMENT — PAIN - FUNCTIONAL ASSESSMENT: PAIN_FUNCTIONAL_ASSESSMENT: ACTIVITIES ARE NOT PREVENTED

## 2020-05-13 ASSESSMENT — PAIN DESCRIPTION - PROGRESSION: CLINICAL_PROGRESSION: NOT CHANGED

## 2020-05-13 ASSESSMENT — PAIN DESCRIPTION - ONSET: ONSET: ON-GOING

## 2020-05-13 ASSESSMENT — PAIN DESCRIPTION - ORIENTATION: ORIENTATION: LEFT;RIGHT

## 2020-05-13 ASSESSMENT — PAIN DESCRIPTION - DESCRIPTORS: DESCRIPTORS: ACHING

## 2020-05-13 NOTE — PROGRESS NOTES
Reviewed discharge instructions with patient, all questions answered and patient verbalized understanding. No prescriptions needed to be call in/printed. No signs or symptoms of pain or distress. All belongings in patient's possession. Waiting on family to arrive for transport home.

## 2020-05-13 NOTE — PROGRESS NOTES
AM assessment completed, see flowsheet. Patient resting in bed at this time. All needs met. Respirations easy and even at rest. Bed in lowest position and locked, SR up x2. Call light and bedside table within reach.

## 2020-05-13 NOTE — DISCHARGE SUMMARY
Name:  Aura Ferrara  Room:  /5854-47  MRN:    [id number]    Discharge Summary      This discharge summary is in conjunction with a complete physical exam done on the day of discharge. Discharging Physician: Dr. Ni Mon: 5/10/2020  Discharge: 5/13/2020     HPI taken from admission H&P:    37 y.o. male presents with complaint of chest pain, cough nausea for the apst 2 days. Chest pain has been substernal worse with a non productive cough. Patient denies fever, chills, leg edema, abdominal pain, diarrhea, sob or sick contacts    Diagnoses this Admission and Hospital Course    DKA  Insulin-dependent diabetes mellitus, uncontrolled  -Patient not compliant with his insulin regimen at home. Patient feels that he just does not understand his insulin regimen, and many times skips taking his insulin if he does not eat enough  -Admitted to ICU for DKA  -Anion gap corrected now. -Patient off of insulin drip and started on Lantus twice daily.  -Tolerating carb controlled diet  -Discussed with patient. He would like a simpler regimen for insulin administration at home, does not feel he could follow a sliding scale regimen. He is on Levemir 40 units subcu twice daily at home, will resume. (Patient will get Lantus here in the hospital) . Will add NovoLog( humalog) 5 units 3 times a day with meals. Patient advised to take his Levemir regularly, hold his Humalog if he is not eating a meal.   - Patient advised to check his blood sugars 4 times daily at home.   Home health care with diabetic teaching has been set up.     Chest pain  - likely pleuritic  - following serial trop -negative     NOVA  - likely pre renal, continue IVF's follow renal function  -NOVA resolved, creatinine normal now     Hypotension  -Improved with saline bolus      Hypokalemia   - replete      Procedures (Please Review Full Report for Details)  None     Consults    Critical Care     Physical Exam at Discharge:    /81   Pulse vial  Generic drug:  insulin detemir     lisinopril 10 MG tablet  Commonly known as:  PRINIVIL;ZESTRIL     LOVASTATIN PO     pantoprazole sodium 40 MG Pack packet  Commonly known as:  PROTONIX     pregabalin 150 MG capsule  Commonly known as:  LYRICA     tamsulosin 0.4 MG capsule  Commonly known as:  FLOMAX     tiZANidine 4 MG tablet  Commonly known as:  ZANAFLEX     vitamin D 1.25 MG (21775 UT) Caps capsule  Commonly known as:  ERGOCALCIFEROL        STOP taking these medications    LORazepam 1 MG tablet  Commonly known as:  ATIVAN          Discharged in stable condition to home    Follow Up:   Follow up with PCP        Leeanna Schaumann, MD  5/13/2020

## 2020-05-13 NOTE — PLAN OF CARE
Problem: Falls - Risk of:  Goal: Will remain free from falls  Description: Will remain free from falls  Outcome: Ongoing     Problem: Pain:  Goal: Pain level will decrease  Description: Pain level will decrease  Outcome: Ongoing  Goal: Control of chronic pain  Description: Control of chronic pain  Outcome: Ongoing     Problem: Nausea/Vomiting:  Goal: Absence of nausea/vomiting  Description: Absence of nausea/vomiting  Outcome: Ongoing

## 2020-10-28 ENCOUNTER — APPOINTMENT (OUTPATIENT)
Dept: GENERAL RADIOLOGY | Age: 35
End: 2020-10-28
Payer: COMMERCIAL

## 2020-10-28 ENCOUNTER — HOSPITAL ENCOUNTER (EMERGENCY)
Age: 35
Discharge: HOME | End: 2020-10-28
Payer: COMMERCIAL

## 2020-10-28 VITALS
DIASTOLIC BLOOD PRESSURE: 73 MMHG | SYSTOLIC BLOOD PRESSURE: 126 MMHG | HEART RATE: 90 BPM | TEMPERATURE: 98.2 F | WEIGHT: 216 LBS | HEIGHT: 70 IN | BODY MASS INDEX: 30.92 KG/M2 | OXYGEN SATURATION: 99 % | RESPIRATION RATE: 18 BRPM

## 2020-10-28 LAB
A/G RATIO: 2 (ref 1.1–2.2)
ALBUMIN SERPL-MCNC: 5 G/DL (ref 3.4–5)
ALP BLD-CCNC: 95 U/L (ref 40–129)
ALT SERPL-CCNC: 14 U/L (ref 10–40)
AMPHETAMINE SCREEN, URINE: NORMAL
ANION GAP SERPL CALCULATED.3IONS-SCNC: 11 MMOL/L (ref 3–16)
AST SERPL-CCNC: 16 U/L (ref 15–37)
BACTERIA: ABNORMAL /HPF
BARBITURATE SCREEN URINE: NORMAL
BASE EXCESS VENOUS: -3.6 MMOL/L (ref -3–3)
BASOPHILS ABSOLUTE: 0.1 K/UL (ref 0–0.2)
BASOPHILS RELATIVE PERCENT: 0.7 %
BENZODIAZEPINE SCREEN, URINE: NORMAL
BETA-HYDROXYBUTYRATE: 0.4 MMOL/L (ref 0–0.27)
BILIRUB SERPL-MCNC: 0.5 MG/DL (ref 0–1)
BILIRUBIN URINE: ABNORMAL
BLOOD, URINE: NEGATIVE
BUN BLDV-MCNC: 21 MG/DL (ref 7–20)
CALCIUM SERPL-MCNC: 9.9 MG/DL (ref 8.3–10.6)
CANNABINOID SCREEN URINE: NORMAL
CARBOXYHEMOGLOBIN: 1.2 % (ref 0–1.5)
CHLORIDE BLD-SCNC: 97 MMOL/L (ref 99–110)
CLARITY: CLEAR
CO2: 18 MMOL/L (ref 21–32)
COCAINE METABOLITE SCREEN URINE: NORMAL
COLOR: YELLOW
CREAT SERPL-MCNC: 0.9 MG/DL (ref 0.9–1.3)
EKG ATRIAL RATE: 101 BPM
EKG DIAGNOSIS: NORMAL
EKG P AXIS: 54 DEGREES
EKG P-R INTERVAL: 122 MS
EKG Q-T INTERVAL: 328 MS
EKG QRS DURATION: 90 MS
EKG QTC CALCULATION (BAZETT): 425 MS
EKG R AXIS: 66 DEGREES
EKG T AXIS: 60 DEGREES
EKG VENTRICULAR RATE: 101 BPM
EOSINOPHILS ABSOLUTE: 0 K/UL (ref 0–0.6)
EOSINOPHILS RELATIVE PERCENT: 0.2 %
EPITHELIAL CELLS, UA: ABNORMAL /HPF (ref 0–5)
GFR AFRICAN AMERICAN: >60
GFR NON-AFRICAN AMERICAN: >60
GLOBULIN: 2.5 G/DL
GLUCOSE BLD-MCNC: 185 MG/DL
GLUCOSE BLD-MCNC: 185 MG/DL (ref 70–99)
GLUCOSE BLD-MCNC: 253 MG/DL (ref 70–99)
GLUCOSE BLD-MCNC: 298 MG/DL (ref 70–99)
GLUCOSE URINE: >=1000 MG/DL
HCO3 VENOUS: 19.4 MMOL/L (ref 23–29)
HCT VFR BLD CALC: 45.1 % (ref 40.5–52.5)
HEMOGLOBIN: 15.4 G/DL (ref 13.5–17.5)
HYALINE CASTS: ABNORMAL /LPF (ref 0–2)
KETONES, URINE: 15 MG/DL
LACTIC ACID: 1.7 MMOL/L (ref 0.4–2)
LEUKOCYTE ESTERASE, URINE: NEGATIVE
LIPASE: 39 U/L (ref 13–60)
LYMPHOCYTES ABSOLUTE: 1.3 K/UL (ref 1–5.1)
LYMPHOCYTES RELATIVE PERCENT: 14.5 %
Lab: NORMAL
MCH RBC QN AUTO: 29.6 PG (ref 26–34)
MCHC RBC AUTO-ENTMCNC: 34.1 G/DL (ref 31–36)
MCV RBC AUTO: 86.9 FL (ref 80–100)
METHADONE SCREEN, URINE: NORMAL
METHEMOGLOBIN VENOUS: 0.3 %
MICROSCOPIC EXAMINATION: YES
MONOCYTES ABSOLUTE: 1.1 K/UL (ref 0–1.3)
MONOCYTES RELATIVE PERCENT: 11.8 %
MUCUS: ABNORMAL /LPF
NEUTROPHILS ABSOLUTE: 6.6 K/UL (ref 1.7–7.7)
NEUTROPHILS RELATIVE PERCENT: 72.8 %
NITRITE, URINE: NEGATIVE
O2 CONTENT, VEN: 22 VOL %
O2 SAT, VEN: 98 %
O2 THERAPY: ABNORMAL
OPIATE SCREEN URINE: NORMAL
OXYCODONE URINE: NORMAL
PCO2, VEN: 30.6 MMHG (ref 40–50)
PDW BLD-RTO: 12.9 % (ref 12.4–15.4)
PERFORMED ON: ABNORMAL
PERFORMED ON: ABNORMAL
PH UA: 6
PH UA: 6 (ref 5–8)
PH VENOUS: 7.42 (ref 7.35–7.45)
PHENCYCLIDINE SCREEN URINE: NORMAL
PLATELET # BLD: 244 K/UL (ref 135–450)
PMV BLD AUTO: 9.5 FL (ref 5–10.5)
PO2, VEN: 106.4 MMHG (ref 25–40)
POTASSIUM REFLEX MAGNESIUM: 4 MMOL/L (ref 3.5–5.1)
PROPOXYPHENE SCREEN: NORMAL
PROTEIN UA: ABNORMAL MG/DL
RBC # BLD: 5.19 M/UL (ref 4.2–5.9)
RBC UA: ABNORMAL /HPF (ref 0–4)
SODIUM BLD-SCNC: 126 MMOL/L (ref 136–145)
SPECIFIC GRAVITY UA: 1.01 (ref 1–1.03)
TCO2 CALC VENOUS: 20 MMOL/L
TOTAL PROTEIN: 7.5 G/DL (ref 6.4–8.2)
URINE REFLEX TO CULTURE: ABNORMAL
URINE TYPE: ABNORMAL
UROBILINOGEN, URINE: 0.2 E.U./DL
WBC # BLD: 9.1 K/UL (ref 4–11)
WBC UA: ABNORMAL /HPF (ref 0–5)

## 2020-10-28 PROCEDURE — 71046 X-RAY EXAM CHEST 2 VIEWS: CPT

## 2020-10-28 PROCEDURE — 82010 KETONE BODYS QUAN: CPT

## 2020-10-28 PROCEDURE — 96374 THER/PROPH/DIAG INJ IV PUSH: CPT

## 2020-10-28 PROCEDURE — 83690 ASSAY OF LIPASE: CPT

## 2020-10-28 PROCEDURE — 93010 ELECTROCARDIOGRAM REPORT: CPT | Performed by: INTERNAL MEDICINE

## 2020-10-28 PROCEDURE — 93005 ELECTROCARDIOGRAM TRACING: CPT | Performed by: NURSE PRACTITIONER

## 2020-10-28 PROCEDURE — 6360000002 HC RX W HCPCS: Performed by: NURSE PRACTITIONER

## 2020-10-28 PROCEDURE — 80053 COMPREHEN METABOLIC PANEL: CPT

## 2020-10-28 PROCEDURE — 83605 ASSAY OF LACTIC ACID: CPT

## 2020-10-28 PROCEDURE — 81001 URINALYSIS AUTO W/SCOPE: CPT

## 2020-10-28 PROCEDURE — 2500000003 HC RX 250 WO HCPCS: Performed by: NURSE PRACTITIONER

## 2020-10-28 PROCEDURE — 85025 COMPLETE CBC W/AUTO DIFF WBC: CPT

## 2020-10-28 PROCEDURE — 96375 TX/PRO/DX INJ NEW DRUG ADDON: CPT

## 2020-10-28 PROCEDURE — 99284 EMERGENCY DEPT VISIT MOD MDM: CPT

## 2020-10-28 PROCEDURE — 2580000003 HC RX 258: Performed by: NURSE PRACTITIONER

## 2020-10-28 PROCEDURE — 80307 DRUG TEST PRSMV CHEM ANLYZR: CPT

## 2020-10-28 PROCEDURE — 96372 THER/PROPH/DIAG INJ SC/IM: CPT

## 2020-10-28 PROCEDURE — 82803 BLOOD GASES ANY COMBINATION: CPT

## 2020-10-28 RX ORDER — ONDANSETRON 4 MG/1
4 TABLET, FILM COATED ORAL EVERY 8 HOURS PRN
Qty: 20 TABLET | Refills: 0 | Status: ON HOLD | OUTPATIENT
Start: 2020-10-28 | End: 2021-09-14 | Stop reason: HOSPADM

## 2020-10-28 RX ORDER — HALOPERIDOL 5 MG/ML
5 INJECTION INTRAMUSCULAR ONCE
Status: COMPLETED | OUTPATIENT
Start: 2020-10-28 | End: 2020-10-28

## 2020-10-28 RX ORDER — PROMETHAZINE HYDROCHLORIDE 25 MG/ML
12.5 INJECTION, SOLUTION INTRAMUSCULAR; INTRAVENOUS ONCE
Status: COMPLETED | OUTPATIENT
Start: 2020-10-28 | End: 2020-10-28

## 2020-10-28 RX ORDER — 0.9 % SODIUM CHLORIDE 0.9 %
1000 INTRAVENOUS SOLUTION INTRAVENOUS ONCE
Status: COMPLETED | OUTPATIENT
Start: 2020-10-28 | End: 2020-10-28

## 2020-10-28 RX ORDER — METOCLOPRAMIDE HYDROCHLORIDE 5 MG/ML
10 INJECTION INTRAMUSCULAR; INTRAVENOUS ONCE
Status: COMPLETED | OUTPATIENT
Start: 2020-10-28 | End: 2020-10-28

## 2020-10-28 RX ORDER — ONDANSETRON 2 MG/ML
4 INJECTION INTRAMUSCULAR; INTRAVENOUS ONCE
Status: COMPLETED | OUTPATIENT
Start: 2020-10-28 | End: 2020-10-28

## 2020-10-28 RX ORDER — KETOROLAC TROMETHAMINE 30 MG/ML
15 INJECTION, SOLUTION INTRAMUSCULAR; INTRAVENOUS ONCE
Status: COMPLETED | OUTPATIENT
Start: 2020-10-28 | End: 2020-10-28

## 2020-10-28 RX ADMIN — PROMETHAZINE HYDROCHLORIDE 12.5 MG: 25 INJECTION INTRAMUSCULAR; INTRAVENOUS at 13:21

## 2020-10-28 RX ADMIN — HALOPERIDOL LACTATE 5 MG: 5 INJECTION, SOLUTION INTRAMUSCULAR at 14:21

## 2020-10-28 RX ADMIN — KETOROLAC TROMETHAMINE 15 MG: 30 INJECTION, SOLUTION INTRAMUSCULAR; INTRAVENOUS at 16:59

## 2020-10-28 RX ADMIN — SODIUM CHLORIDE 1000 ML: 9 INJECTION, SOLUTION INTRAVENOUS at 11:31

## 2020-10-28 RX ADMIN — FAMOTIDINE 20 MG: 10 INJECTION INTRAVENOUS at 11:31

## 2020-10-28 RX ADMIN — SODIUM CHLORIDE 1000 ML: 9 INJECTION, SOLUTION INTRAVENOUS at 14:21

## 2020-10-28 RX ADMIN — METOCLOPRAMIDE 10 MG: 5 INJECTION, SOLUTION INTRAMUSCULAR; INTRAVENOUS at 16:16

## 2020-10-28 RX ADMIN — SODIUM CHLORIDE 1000 ML: 9 INJECTION, SOLUTION INTRAVENOUS at 16:16

## 2020-10-28 RX ADMIN — ONDANSETRON HYDROCHLORIDE 4 MG: 2 INJECTION, SOLUTION INTRAMUSCULAR; INTRAVENOUS at 11:31

## 2020-10-28 ASSESSMENT — ENCOUNTER SYMPTOMS
SHORTNESS OF BREATH: 0
RHINORRHEA: 0
ABDOMINAL PAIN: 0
NAUSEA: 1
COLOR CHANGE: 0
VOMITING: 1
SORE THROAT: 0

## 2020-10-28 ASSESSMENT — PAIN SCALES - GENERAL
PAINLEVEL_OUTOF10: 8
PAINLEVEL_OUTOF10: 3

## 2020-10-28 NOTE — ED PROVIDER NOTES
I was available for consultation on the patient if deemed necessary by advanced practice provider. I was not involved in the care of this patient nor had any participation in the medical decision making process. I was the attending on duty when the patient came to the ER was seen independently by the KIRILL. The patient was discharged or admitted from the ER without my knowledge. I was available for consultation but was not requested to evaluate the patient, nor asked supervised the case. I did not see the patient and I am signing this chart administratively after the fact. EKG  The Ekg interpreted by me shows  sinus tachycardia, [id number]=[id number] with a rate of 101  Axis is   Normal  QTc is  within an acceptable range  Intervals and Durations are unremarkable. ST Segments: normal  Delta waves, Brugada Syndrome, and Short MD are not present. Prior EKG to compare with was available.  No significant changes compared to prior EKG from May 10, 2020        Sherrie Boyd MD  10/28/20 4694

## 2020-10-28 NOTE — LETTER
"Anesthesia Post Evaluation    Patient: Ida Santana    Procedure(s) Performed: Procedure(s) (LRB):  REPAIR-RETINA (VITRECTOMY) (Left)  INSERTION-INTRAOCULAR LENS (IOL) (Left)  PHACOEMULSIFICATION-ASPIRATION-CATARACT (Left)    Final Anesthesia Type: general  Patient location during evaluation: PACU  Patient participation: Yes- Able to Participate  Level of consciousness: awake and alert  Post-procedure vital signs: reviewed and stable  Pain management: adequate  Airway patency: patent  PONV status at discharge: No PONV  Anesthetic complications: no      Cardiovascular status: blood pressure returned to baseline  Respiratory status: unassisted  Hydration status: euvolemic  Follow-up not needed.        Visit Vitals  BP (!) 112/58   Pulse 82   Temp 36.5 °C (97.7 °F) (Temporal)   Resp 18   Ht 5' 7" (1.702 m)   Wt 66.7 kg (147 lb)   LMP  (LMP Unknown)   SpO2 100%   Breastfeeding? No   BMI 23.02 kg/m²       Pain/Ellen Score: Pain Assessment Performed: Yes (8/2/2017  9:44 AM)  Presence of Pain: non-verbal indicators absent (8/2/2017  9:44 AM)  Ellen Score: 8 (8/2/2017  9:44 AM)      " [facility]  [address] [facility] [address]  Phone: [phone]               October 28, 2020    Patient: Rubén Pinzon   YOB: 1985   Date of Visit: 10/28/2020       To Whom It May Concern:    Herbie Lo was seen and treated in our emergency department on 10/28/2020. He may return to work on 10/29/2020.       Sincerely,       Melita Gomez RN         Signature:__________________________________

## 2020-10-28 NOTE — ED PROVIDER NOTES
Medical History:   Diagnosis Date    Anesthesia complication     BLOOD PRESSURE DROP IN OR DURING THR    Celiac disease     [facility]atomy[facility] ([facility]     Last attack 2009    Diabetes mellitus ([facility]     TYPE I    Fibromyalgia     Hyperlipidemia     Hypertension     Juvenile rheumatoid arthritis ([facility]     Movement disorder     DERMATOMYOSITIS/IN REMISSION    Rheumatoid arthritis(714.0)     Vertebral basilar insufficiency     symptoms related to JRA in cervical area    Wears glasses          SURGICAL HISTORY       Past Surgical History:   Procedure Laterality Date    HIP SURGERY      bilateral    JOINT REPLACEMENT Bilateral     THR    JOINT REPLACEMENT Left 6/3/2016    Revision left lateral total hip replacement    KNEE ARTHROSCOPY Right     KNEE ARTHROSCOPY Left 9/25/2013    medial & lateral Meniscectomy    LIVER BIOPSY      MUSCLE BIOPSY      TYMPANOSTOMY TUBE PLACEMENT      WRIST SURGERY Right     CYST         CURRENT MEDICATIONS       Previous Medications    BUPROPION (WELLBUTRIN SR) 100 MG SR TABLET    Take 100 mg by mouth daily. DULOXETINE (CYMBALTA) 60 MG CAPSULE    Take 60 mg by mouth daily. HYDROXYCHLOROQUINE (PLAQUENIL) 200 MG TABLET    Take 200 mg by mouth 2 times daily. INSULIN ASPART (NOVOLOG) 100 UNIT/ML INJECTION VIAL    Inject 5 Units into the skin 3 times daily (before meals)    INSULIN DETEMIR (LEVEMIR) 100 UNIT/ML INJECTION VIAL    Inject 40 Units into the skin 2 times daily     LISINOPRIL (PRINIVIL;ZESTRIL) 10 MG TABLET    Take 30 mg by mouth daily. LOVASTATIN PO    Take 20 mg by mouth nightly. PANTOPRAZOLE SODIUM (PROTONIX) 40 MG PACK PACKET    Take 40 mg by mouth daily. PREGABALIN (LYRICA) 150 MG CAPSULE    Take 300 mg by mouth 2 times daily.     TAMSULOSIN (FLOMAX) 0.4 MG CAPSULE    Take 0.4 mg by mouth nightly     TIZANIDINE (ZANAFLEX) 4 MG TABLET    Take 4 mg by mouth every 6 hours as needed    VITAMIN D (ERGOCALCIFEROL) 24941 UNIT CAPS CAPSULE    Take 50,000 Units by mouth once a week. On thursdays         ALLERGIES     Aspirin    FAMILY HISTORY       Family History   Problem Relation Age of Onset    High Blood Pressure Mother     Diabetes Father     Heart Disease Maternal Grandmother           SOCIAL HISTORY       Social History     Socioeconomic History    Marital status:      Spouse name: None    Number of children: None    Years of education: None    Highest education level: None   Occupational History    Occupation:    Social Needs    Financial resource strain: None    Food insecurity     Worry: None     Inability: None    Transportation needs     Medical: None     Non-medical: None   Tobacco Use    Smoking status: Never Smoker    Smokeless tobacco: Never Used   Substance and Sexual Activity    Alcohol use: Yes     Comment: once/year    Drug use: No    Sexual activity: Yes     Partners: Female   Lifestyle    Physical activity     Days per week: None     Minutes per session: None    Stress: None   Relationships    Social connections     Talks on phone: None     Gets together: None     Attends [employer] witness service: None     Active member of club or organization: None     Attends meetings of clubs or organizations: None     Relationship status: None    Intimate partner violence     Fear of current or ex partner: None     Emotionally abused: None     Physically abused: None     Forced sexual activity: None   Other Topics Concern    None   Social History Narrative    None       SCREENINGS    [place] Coma Scale  Eye Opening: Spontaneous  Best Verbal Response: Oriented  Best Motor Response: Obeys commands  Susie Coma Scale Score: 15        PHYSICAL EXAM  (up to 7 for level 4, 8 or more for level 5)     ED Triage Vitals [10/28/20 1117]   BP Temp Temp src Pulse Resp SpO2 Height Weight   (!) 157/141 -- -- 114 18 98 % 5' 9.5\" (1.765 m) 216 lb (98 kg)       Physical Exam  Constitutional:       Appearance: He is well-developed.    HENT: Head: Normocephalic and atraumatic. Neck:      Musculoskeletal: Normal range of motion. Cardiovascular:      Rate and Rhythm: Tachycardia present. Pulmonary:      Effort: Pulmonary effort is normal. No respiratory distress. Abdominal:      General: There is no distension. Palpations: Abdomen is soft. Tenderness: There is generalized abdominal tenderness. Musculoskeletal: Normal range of motion. Skin:     General: Skin is warm and dry. Neurological:      Mental Status: He is alert and oriented to person, place, and time.          DIAGNOSTIC RESULTS   LABS:    Labs Reviewed   COMPREHENSIVE METABOLIC PANEL W/ REFLEX TO MG FOR LOW K - Abnormal; Notable for the following components:       Result Value    Sodium 126 (*)     Chloride 97 (*)     CO2 18 (*)     Glucose 298 (*)     BUN 21 (*)     All other components within normal limits    Narrative:     Performed at:  [facility],  [place] [facility]   Phone [phone]   URINE RT REFLEX TO CULTURE - Abnormal; Notable for the following components:    Glucose, Ur >=1000 (*)     Bilirubin Urine SMALL (*)     Ketones, Urine 15 (*)     Protein, UA TRACE (*)     All other components within normal limits    Narrative:     Performed at:  [place] [address],  [place] [facility]   Phone [phone]   BETA-HYDROXYBUTYRATE - Abnormal; Notable for the following components:    Beta-Hydroxybutyrate 0.40 (*)     All other components within normal limits    Narrative:     Performed at:  [place] [address],  [employer], [facility]   Phone [phone]   BLOOD GAS, VENOUS - Abnormal; Notable for the following components:    pCO2, [facility] 30.6 (*)     pO2, [facility] 106.4 (*)     HCO3, Venous 19.4 (*)     Base Excess, [facility] -3.6 (*)     All other components within normal limits    Narrative:     Performed at:  [facility] [address]  [place], [place] [employer] [place]   Phone [phone]   MICROSCOPIC URINALYSIS - Abnormal; Notable for the following components:    Hyaline Casts, UA 3-5 (*)     Mucus, UA 1+ (*)     Bacteria, UA 1+ (*)     All other components within normal limits    Narrative:     Performed at:  [facility] [address],  [place], [place]   Phone [phone]   POCT GLUCOSE - Abnormal; Notable for the following components:    POC Glucose 253 (*)     All other components within normal limits    Narrative:     Performed at:  [facility] [address],  [place], [place]   Phone [phone]   POCT GLUCOSE - Abnormal; Notable for the following components:    POC Glucose 185 (*)     All other components within normal limits    Narrative:     Performed at:  [place] [address],  [place], [place]   Phone [phone]   POCT GLUCOSE - Normal   CBC WITH AUTO DIFFERENTIAL    Narrative:     Performed at:  [place] [address],  [place], [place]   Phone [phone]   LIPASE    Narrative:     Performed at:  [place] [address],  [place], [place]   Phone [phone]   URINE DRUG SCREEN    Narrative:     Performed at:  [facility],  [place], [place]   Phone [phone]   LACTIC ACID, PLASMA    Narrative:     Performed at:  [facility],  [place], [place]   Phone [phone]   POCT GLUCOSE       All other labs werewithin normal range or not returned as of this dictation. EKG: All EKG's are interpreted by the Emergency Department Physician who either signs or Co-signs this chart in the absence of a cardiologist.  Please see their note for interpretation of EKG.       RADIOLOGY: Chest x-ray interpreted by radiologist for  FINDINGS:   Normal cardiac size.  No evidence of pneumonia, edema or other acute   pulmonary process. No evidence of acute process of the cardiac or mediastinal   structures. No evidence of pneumothorax or pleural effusion. Interpretation per the Radiologist below, if available at the time of this note:    XR CHEST (2 VW)   Final Result   No evidence of acute cardiopulmonary disease. No results found. PROCEDURES   Unless otherwise noted below, none     Procedures     CRITICAL CARE TIME   N/A    CONSULTS:  None      EMERGENCYDEPARTMENT COURSE and DIFFERENTIAL DIAGNOSIS/MDM:   Vitals:    Vitals:    10/28/20 1339 10/28/20 1505 10/28/20 1535 10/28/20 1606   BP: 132/89 119/79 135/84 135/66   Pulse: 90 89 88 86   Resp: 18 18 16 16   Temp:       TempSrc:       SpO2: 99% 99% 99% 99%   Weight:       Height:           Patient was given the following medications:  Medications   0.9 % sodium chloride bolus (0 mLs Intravenous Stopped 10/28/20 1340)   ondansetron (ZOFRAN) injection 4 mg (4 mg Intravenous Given 10/28/20 1131)   famotidine (PEPCID) injection 20 mg (20 mg Intravenous Given 10/28/20 1131)   promethazine (PHENERGAN) injection 12.5 mg (12.5 mg Intravenous Given 10/28/20 1321)   0.9 % sodium chloride bolus (0 mLs Intravenous Stopped 10/28/20 1537)   haloperidol lactate (HALDOL) injection 5 mg (5 mg Intramuscular Given 10/28/20 1421)   metoclopramide (REGLAN) injection 10 mg (10 mg Intravenous Given 10/28/20 1616)   0.9 % sodium chloride bolus (1,000 mLs Intravenous New Bag 10/28/20 1616)   ketorolac (TORADOL) injection 15 mg (15 mg Intravenous Given 10/28/20 1659)       Patient was seen and evaluated by myself. Patient here for complaints of vomiting. Patient states that he has had elevated blood sugars and he has had vomiting for the last few days. On exam the patient is awake and alert hemodynamically stable nontoxic in appearance.   Patient was given multiple doses of nausea medications. He has had multiple reevaluations and eventually states that he does feel better. He is also received multiple doses of IV fluids. Patient's blood sugar has improved. He was hyperglycemic without ketosis. On the final evaluation the patient states that he does feel better. I was reviewing the patient's medication list and found that he takes tramadol. I did confirm with him that he is still currently on tramadol and this is not a withdrawal.  Patient states he does still have tramadol. Patient will be discharged home with a prescription for Zofran. He was given instructions to follow-up with his primary care doctor in the next few days and return to the ED for any worsening symptoms. Patient was ultimately discharged home with all questions answered. The patient tolerated their visit well. I have evaluated this patient. My supervising physician was available for consultation. The patient and / or the family were informed of the results of any tests, a time was given to answer questions, a plan was proposed and they agreed with plan. FINAL IMPRESSION      1. Non-intractable vomiting with nausea, unspecified vomiting type    2.  Hyperglycemia without ketosis          DISPOSITION/PLAN   DISPOSITION Decision To Discharge 10/28/2020 04:57:45 PM      PATIENT REFERRED TO:  Colton Castleman, MD  [place] [id number] [facility]  [phone]    Schedule an appointment as soon as possible for a visit in 2 days  If symptoms worsen    [facility] ED  [address] [facility]  [phone]    If symptoms worsen      DISCHARGE MEDICATIONS:  New Prescriptions    ONDANSETRON (ZOFRAN) 4 MG TABLET    Take 1 tablet by mouth every 8 hours as needed for Nausea       DISCONTINUED MEDICATIONS:  Discontinued Medications    No medications on file              (Please note that portions of this note were completed with a voice recognition program.  Efforts were made to edit the dictations but occasionally words are mis-transcribed.)    FAIZAN Cai CNP (electronically signed)         FAIZAN Cai CNP  10/28/20 [facility], APRN - CNP  10/28/20 5419

## 2020-10-28 NOTE — ED NOTES
Saline lock removed intact. IV site looked unremarkable. Pressure dressing applied. Discharge instructions reviewed with Mr. Yovanny Miranda. He verbalized understanding. Copy of discharge instructions and prescriptions given. Mr. Yovanny Miranda was discharged to home in good condition per personal vehicle, friend/family driving. He exited the ED without difficulty.    Floretta Denver, RN  10/28/20 1347

## 2021-06-16 ENCOUNTER — HOSPITAL ENCOUNTER (EMERGENCY)
Age: 36
Discharge: HOME | End: 2021-06-16
Attending: STUDENT IN AN ORGANIZED HEALTH CARE EDUCATION/TRAINING PROGRAM
Payer: COMMERCIAL

## 2021-06-16 ENCOUNTER — APPOINTMENT (OUTPATIENT)
Dept: GENERAL RADIOLOGY | Age: 36
End: 2021-06-16
Payer: COMMERCIAL

## 2021-06-16 ENCOUNTER — APPOINTMENT (OUTPATIENT)
Dept: CT IMAGING | Age: 36
End: 2021-06-16
Payer: COMMERCIAL

## 2021-06-16 VITALS
OXYGEN SATURATION: 99 % | WEIGHT: 215 LBS | RESPIRATION RATE: 16 BRPM | DIASTOLIC BLOOD PRESSURE: 80 MMHG | HEIGHT: 69 IN | BODY MASS INDEX: 31.84 KG/M2 | HEART RATE: 97 BPM | SYSTOLIC BLOOD PRESSURE: 125 MMHG | TEMPERATURE: 98.8 F

## 2021-06-16 DIAGNOSIS — R79.89 ELEVATED LACTIC ACID LEVEL: ICD-10-CM

## 2021-06-16 DIAGNOSIS — R10.84 GENERALIZED ABDOMINAL PAIN: ICD-10-CM

## 2021-06-16 DIAGNOSIS — R73.9 HYPERGLYCEMIA: Primary | ICD-10-CM

## 2021-06-16 DIAGNOSIS — R11.2 NON-INTRACTABLE VOMITING WITH NAUSEA, UNSPECIFIED VOMITING TYPE: ICD-10-CM

## 2021-06-16 DIAGNOSIS — E87.1 HYPONATREMIA: ICD-10-CM

## 2021-06-16 LAB
A/G RATIO: 1.5 (ref 1.1–2.2)
ALBUMIN SERPL-MCNC: 4.8 G/DL (ref 3.4–5)
ALP BLD-CCNC: 158 U/L (ref 40–129)
ALT SERPL-CCNC: 17 U/L (ref 10–40)
ANION GAP SERPL CALCULATED.3IONS-SCNC: 15 MMOL/L (ref 3–16)
AST SERPL-CCNC: 9 U/L (ref 15–37)
BASE EXCESS VENOUS: -5.2 MMOL/L (ref -3–3)
BASOPHILS ABSOLUTE: 0.3 K/UL (ref 0–0.2)
BASOPHILS RELATIVE PERCENT: 2.6 %
BETA-HYDROXYBUTYRATE: 0.5 MMOL/L (ref 0–0.27)
BILIRUB SERPL-MCNC: 0.3 MG/DL (ref 0–1)
BILIRUBIN URINE: ABNORMAL
BLOOD, URINE: NEGATIVE
BUN BLDV-MCNC: 11 MG/DL (ref 7–20)
CALCIUM SERPL-MCNC: 9.9 MG/DL (ref 8.3–10.6)
CARBOXYHEMOGLOBIN: 0.9 % (ref 0–1.5)
CHLORIDE BLD-SCNC: 98 MMOL/L (ref 99–110)
CLARITY: CLEAR
CO2: 16 MMOL/L (ref 21–32)
COLOR: ABNORMAL
CREAT SERPL-MCNC: 0.8 MG/DL (ref 0.9–1.3)
EKG ATRIAL RATE: 108 BPM
EKG DIAGNOSIS: NORMAL
EKG P AXIS: 44 DEGREES
EKG P-R INTERVAL: 118 MS
EKG Q-T INTERVAL: 300 MS
EKG QRS DURATION: 80 MS
EKG QTC CALCULATION (BAZETT): 402 MS
EKG R AXIS: 58 DEGREES
EKG T AXIS: 42 DEGREES
EKG VENTRICULAR RATE: 108 BPM
EOSINOPHILS ABSOLUTE: 0 K/UL (ref 0–0.6)
EOSINOPHILS RELATIVE PERCENT: 0.4 %
GFR AFRICAN AMERICAN: >60
GFR NON-AFRICAN AMERICAN: >60
GLOBULIN: 3.1 G/DL
GLUCOSE BLD-MCNC: 164 MG/DL
GLUCOSE BLD-MCNC: 164 MG/DL (ref 70–99)
GLUCOSE BLD-MCNC: 418 MG/DL (ref 70–99)
GLUCOSE URINE: >=1000 MG/DL
HCO3 VENOUS: 17.8 MMOL/L (ref 23–29)
HCT VFR BLD CALC: 48.5 % (ref 40.5–52.5)
HEMOGLOBIN: 16.4 G/DL (ref 13.5–17.5)
KETONES, URINE: 15 MG/DL
LACTIC ACID: 1.5 MMOL/L (ref 0.4–2)
LACTIC ACID: 3.2 MMOL/L (ref 0.4–2)
LEUKOCYTE ESTERASE, URINE: NEGATIVE
LIPASE: 33 U/L (ref 13–60)
LYMPHOCYTES ABSOLUTE: 0.9 K/UL (ref 1–5.1)
LYMPHOCYTES RELATIVE PERCENT: 8.2 %
MCH RBC QN AUTO: 29.2 PG (ref 26–34)
MCHC RBC AUTO-ENTMCNC: 33.8 G/DL (ref 31–36)
MCV RBC AUTO: 86.5 FL (ref 80–100)
METHEMOGLOBIN VENOUS: 0.3 %
MICROSCOPIC EXAMINATION: ABNORMAL
MONOCYTES ABSOLUTE: 0.8 K/UL (ref 0–1.3)
MONOCYTES RELATIVE PERCENT: 7.3 %
NEUTROPHILS ABSOLUTE: 8.7 K/UL (ref 1.7–7.7)
NEUTROPHILS RELATIVE PERCENT: 81.5 %
NITRITE, URINE: NEGATIVE
O2 CONTENT, VEN: 22 VOL %
O2 SAT, VEN: 90 %
O2 THERAPY: ABNORMAL
PCO2, VEN: 29.1 MMHG (ref 40–50)
PDW BLD-RTO: 13.2 % (ref 12.4–15.4)
PERFORMED ON: ABNORMAL
PH UA: 6.5 (ref 5–8)
PH VENOUS: 7.4 (ref 7.35–7.45)
PLATELET # BLD: 274 K/UL (ref 135–450)
PMV BLD AUTO: 9.3 FL (ref 5–10.5)
PO2, VEN: 56.8 MMHG (ref 25–40)
POTASSIUM REFLEX MAGNESIUM: 3.7 MMOL/L (ref 3.5–5.1)
PROTEIN UA: NEGATIVE MG/DL
RBC # BLD: 5.61 M/UL (ref 4.2–5.9)
SARS-COV-2, NAAT: NOT DETECTED
SODIUM BLD-SCNC: 129 MMOL/L (ref 136–145)
SPECIFIC GRAVITY UA: <=1.005 (ref 1–1.03)
TCO2 CALC VENOUS: 19 MMOL/L
TOTAL PROTEIN: 7.9 G/DL (ref 6.4–8.2)
TROPONIN: <0.01 NG/ML
URINE REFLEX TO CULTURE: ABNORMAL
URINE TYPE: ABNORMAL
UROBILINOGEN, URINE: 0.2 E.U./DL
WBC # BLD: 10.6 K/UL (ref 4–11)

## 2021-06-16 PROCEDURE — 84484 ASSAY OF TROPONIN QUANT: CPT

## 2021-06-16 PROCEDURE — 82803 BLOOD GASES ANY COMBINATION: CPT

## 2021-06-16 PROCEDURE — 93010 ELECTROCARDIOGRAM REPORT: CPT | Performed by: INTERNAL MEDICINE

## 2021-06-16 PROCEDURE — 82010 KETONE BODYS QUAN: CPT

## 2021-06-16 PROCEDURE — 96374 THER/PROPH/DIAG INJ IV PUSH: CPT

## 2021-06-16 PROCEDURE — 87635 SARS-COV-2 COVID-19 AMP PRB: CPT

## 2021-06-16 PROCEDURE — 6360000004 HC RX CONTRAST MEDICATION: Performed by: PHYSICIAN ASSISTANT

## 2021-06-16 PROCEDURE — 80053 COMPREHEN METABOLIC PANEL: CPT

## 2021-06-16 PROCEDURE — 96375 TX/PRO/DX INJ NEW DRUG ADDON: CPT

## 2021-06-16 PROCEDURE — 83605 ASSAY OF LACTIC ACID: CPT

## 2021-06-16 PROCEDURE — 71045 X-RAY EXAM CHEST 1 VIEW: CPT

## 2021-06-16 PROCEDURE — 93005 ELECTROCARDIOGRAM TRACING: CPT | Performed by: EMERGENCY MEDICINE

## 2021-06-16 PROCEDURE — 2580000003 HC RX 258: Performed by: PHYSICIAN ASSISTANT

## 2021-06-16 PROCEDURE — 2580000003 HC RX 258: Performed by: EMERGENCY MEDICINE

## 2021-06-16 PROCEDURE — 81003 URINALYSIS AUTO W/O SCOPE: CPT

## 2021-06-16 PROCEDURE — 6360000002 HC RX W HCPCS: Performed by: PHYSICIAN ASSISTANT

## 2021-06-16 PROCEDURE — 6360000002 HC RX W HCPCS: Performed by: EMERGENCY MEDICINE

## 2021-06-16 PROCEDURE — 83690 ASSAY OF LIPASE: CPT

## 2021-06-16 PROCEDURE — 99284 EMERGENCY DEPT VISIT MOD MDM: CPT

## 2021-06-16 PROCEDURE — 85025 COMPLETE CBC W/AUTO DIFF WBC: CPT

## 2021-06-16 PROCEDURE — 74177 CT ABD & PELVIS W/CONTRAST: CPT

## 2021-06-16 RX ORDER — METOCLOPRAMIDE HYDROCHLORIDE 5 MG/ML
10 INJECTION INTRAMUSCULAR; INTRAVENOUS ONCE
Status: COMPLETED | OUTPATIENT
Start: 2021-06-16 | End: 2021-06-16

## 2021-06-16 RX ORDER — 0.9 % SODIUM CHLORIDE 0.9 %
1000 INTRAVENOUS SOLUTION INTRAVENOUS ONCE
Status: COMPLETED | OUTPATIENT
Start: 2021-06-16 | End: 2021-06-16

## 2021-06-16 RX ORDER — ONDANSETRON 2 MG/ML
4 INJECTION INTRAMUSCULAR; INTRAVENOUS ONCE
Status: COMPLETED | OUTPATIENT
Start: 2021-06-16 | End: 2021-06-16

## 2021-06-16 RX ORDER — METOCLOPRAMIDE 10 MG/1
10 TABLET ORAL EVERY 8 HOURS PRN
Qty: 10 TABLET | Refills: 0 | Status: SHIPPED | OUTPATIENT
Start: 2021-06-16 | End: 2021-06-16 | Stop reason: SDUPTHER

## 2021-06-16 RX ORDER — METOCLOPRAMIDE 10 MG/1
10 TABLET ORAL EVERY 8 HOURS PRN
Qty: 10 TABLET | Refills: 0 | Status: ON HOLD | OUTPATIENT
Start: 2021-06-16 | End: 2021-09-08

## 2021-06-16 RX ADMIN — SODIUM CHLORIDE 1000 ML: 9 INJECTION, SOLUTION INTRAVENOUS at 15:47

## 2021-06-16 RX ADMIN — METOCLOPRAMIDE 10 MG: 5 INJECTION, SOLUTION INTRAMUSCULAR; INTRAVENOUS at 16:02

## 2021-06-16 RX ADMIN — ONDANSETRON HYDROCHLORIDE 4 MG: 2 INJECTION, SOLUTION INTRAMUSCULAR; INTRAVENOUS at 14:21

## 2021-06-16 RX ADMIN — SODIUM CHLORIDE 1000 ML: 9 INJECTION, SOLUTION INTRAVENOUS at 14:20

## 2021-06-16 RX ADMIN — IOPAMIDOL 75 ML: 755 INJECTION, SOLUTION INTRAVENOUS at 15:29

## 2021-06-16 ASSESSMENT — PAIN DESCRIPTION - PAIN TYPE: TYPE: ACUTE PAIN

## 2021-06-16 ASSESSMENT — ENCOUNTER SYMPTOMS
COUGH: 1
NAUSEA: 1
VOMITING: 1
ABDOMINAL PAIN: 1
CHEST TIGHTNESS: 1
SHORTNESS OF BREATH: 1

## 2021-06-16 ASSESSMENT — PAIN DESCRIPTION - DESCRIPTORS: DESCRIPTORS: SHARP

## 2021-06-16 ASSESSMENT — PAIN DESCRIPTION - LOCATION: LOCATION: ABDOMEN

## 2021-06-16 ASSESSMENT — PAIN DESCRIPTION - FREQUENCY: FREQUENCY: INTERMITTENT

## 2021-06-16 NOTE — ED PROVIDER NOTES
[address] ED  EMERGENCY DEPARTMENT ENCOUNTER        Pt Name: Renata Charlton  MRN: [id number]  [place] 1985  Date of evaluation: 6/16/2021  Provider: Nikki Phelps PA-C  PCP: Re Claudio MD    Shared Visit or Autonomous Visit:  I have seen and evaluated this patient with my supervising physician Felicity Anthony, [facility]       Chief Complaint   Patient presents with    Hyperglycemia     pt has been sick x 1 week with n/v and abdominal pain, pt states his readings have been running high at home and enroute ems states pt FS was 400 all VSS enroute, pt is tachy in 120's at this time during triage       HISTORY OF PRESENT ILLNESS   (Location/Symptom, Timing/Onset, Context/Setting, Quality, Duration, Modifying Factors, Severity)  Note limiting factors. Renata Charlton is a 38 y.o. male presenting to ER for evaluation elevated glucose 400, nausea and vomiting, last ate 5 days ago. Taking sips of gatorade and water states has been using his insulin. States scanner ran out and hasn't been able to check his glucose the past few days. Generalized abdominal pain states believes pain is from vomiting. Chest pain/ tightness started this morning. Feels short of breath. Has had a cough. No fever. Feels generally weak and fatigued. Hx DKA. The history is provided by the patient. Nausea & Vomiting  Duration:  5 days  Chronicity:  New  Associated symptoms: abdominal pain, cough and myalgias    Associated symptoms: no fever    Associated symptoms comment:  Leg cramps          Nursing Notes were reviewed    REVIEW OF SYSTEMS    (2-9 systems for level 4, 10 or more for level 5)     Review of Systems   Constitutional: Positive for fatigue. Negative for fever. Respiratory: Positive for cough, chest tightness and shortness of breath. Cardiovascular: Positive for chest pain. Negative for leg swelling.    Gastrointestinal: Positive for abdominal pain, nausea and (LYRICA) 150 MG CAPSULE    Take 300 mg by mouth 2 times daily. TAMSULOSIN (FLOMAX) 0.4 MG CAPSULE    Take 0.4 mg by mouth nightly     TIZANIDINE (ZANAFLEX) 4 MG TABLET    Take 4 mg by mouth every 6 hours as needed    VITAMIN D (ERGOCALCIFEROL) 74613 UNIT CAPS CAPSULE    Take 50,000 Units by mouth once a week. On thursdays         ALLERGIES     Aspirin    FAMILYHISTORY       Family History   Problem Relation Age of Onset    High Blood Pressure Mother     Diabetes Father     Heart Disease Maternal Grandmother           SOCIAL HISTORY       Social History     Socioeconomic History    Marital status:      Spouse name: None    Number of children: None    Years of education: None    Highest education level: None   Occupational History    Occupation: [email or web address]   Tobacco Use    Smoking status: Never Smoker    Smokeless tobacco: Never Used   Vaping Use    Vaping Use: Never used   Substance and Sexual Activity    Alcohol use: Yes     Comment: occasionally    Drug use: No    Sexual activity: Yes     Partners: Female   Other Topics Concern    None   Social History Narrative    None     Social Determinants of Health     Financial Resource Strain:     Difficulty of Paying Living Expenses:    Food Insecurity:     Worried About Running Out of Food in the Last Year:     Ran Out of Food in the Last Year:    Transportation Needs:     Lack of Transportation (Medical):      Lack of Transportation (Non-Medical):    Physical Activity:     Days of Exercise per Week:     Minutes of Exercise per Session:    Stress:     Feeling of Stress :    Social Connections:     Frequency of Communication with Friends and Family:     Frequency of Social Gatherings with Friends and Family:     Attends [employer] Services:     Active Member of Clubs or Organizations:     Attends Club or Organization Meetings:     Marital Status:    Intimate Partner Violence:     Fear of Current or Ex-Partner:     Emotionally Abused:  Physically Abused:     Sexually Abused:        SCREENINGS    Susie Coma Scale  Eye Opening: Spontaneous  Best Verbal Response: Oriented  Best Motor Response: Obeys commands  Pioneer Coma Scale Score: 15        PHYSICAL EXAM    (up to 7 for level 4, 8 or more for level 5)     ED Triage Vitals [06/16/21 1333]   BP Temp Temp Source Pulse Resp SpO2 Height Weight   (!) 150/96 98.8 °F (37.1 °C) Oral 128 20 98 % 5' 9\" (1.753 m) 215 lb (97.5 kg)       Physical Exam  Vitals and nursing note reviewed. Constitutional:       Appearance: He is well-developed. He is not toxic-appearing. Comments: Appears he does not feel well but is not toxic appearing. HENT:      Head: Normocephalic and atraumatic. Mouth/Throat:      Mouth: Mucous membranes are moist.      Pharynx: Oropharynx is clear. No pharyngeal swelling or posterior oropharyngeal erythema. Eyes:      Conjunctiva/sclera: Conjunctivae normal.      Pupils: Pupils are equal, round, and reactive to light. Cardiovascular:      Rate and Rhythm: Regular rhythm. Tachycardia present. Pulses: Normal pulses. Heart sounds: Normal heart sounds. Pulmonary:      Effort: Pulmonary effort is normal. No respiratory distress. Breath sounds: Normal breath sounds. No stridor. No wheezing, rhonchi or rales. Abdominal:      General: Bowel sounds are normal. There is no distension. Palpations: Abdomen is soft. Abdomen is not rigid. Tenderness: There is no abdominal tenderness. There is no guarding or rebound. Musculoskeletal:         General: Normal range of motion. Cervical back: Normal range of motion and neck supple. Skin:     General: Skin is warm and dry. Neurological:      Mental Status: He is alert and oriented to person, place, and time. GCS: GCS eye subscore is 4. GCS verbal subscore is 5. GCS motor subscore is 6. Cranial Nerves: No cranial nerve deficit. Sensory: No sensory deficit.       Motor: No abnormal at:  [facility] [address],  [place], [place]   Phone [phone]   LACTIC ACID, PLASMA - Abnormal; Notable for the following components:    Lactic Acid 3.2 (*)     All other components within normal limits    Narrative:     Performed at:  [facility] [address],  [place], [place]   Phone [phone]   POCT GLUCOSE - Abnormal; Notable for the following components:    POC Glucose 164 (*)     All other components within normal limits    Narrative:     Performed at:  [place] [address],  [place], [place]   Phone [phone]   POCT GLUCOSE - Normal   COVID-19, RAPID    Narrative:     Performed at:  [place] [address],  [place] [employer], [place]   Phone [phone]   LIPASE    Narrative:     Performed at:  [place] [address],  [employer]   Phone [phone]   TROPONIN    Narrative:     Performed at:  [place] [address],  [place] [employer], [place] [employer]   Phone [phone]   LACTIC ACID, PLASMA    Narrative:     Performed at:  [place] [address],  [employer], [place]   Phone [phone]     Results for orders placed or performed during the hospital encounter of 06/16/21   COVID-19, Rapid    Specimen: Nasopharyngeal Swab   Result Value Ref Range    SARS-CoV-2, NAAT Not Detected Not Detected   CBC Auto Differential   Result Value Ref Range    WBC 10.6 4.0 - 11.0 K/uL    RBC 5.61 4.20 - 5.90 M/uL    Hemoglobin 16.4 13.5 - 17.5 g/dL    Hematocrit 48.5 40.5 - 52.5 %    MCV 86.5 80.0 - 100.0 fL    MCH 29.2 26.0 - 34.0 pg    MCHC 33.8 31.0 - 36.0 g/dL    RDW 13.2 12.4 - 15.4 %    Platelets 360 342 - 009 K/uL    MPV 9.3 5.0 - 10.5 fL    Neutrophils % 81.5 %    Lymphocytes % 8.2 %    Monocytes % 7.3 % Eosinophils % 0.4 %    Basophils % 2.6 %    Neutrophils Absolute 8.7 (H) 1.7 - 7.7 K/uL    Lymphocytes Absolute 0.9 (L) 1.0 - 5.1 K/uL    Monocytes Absolute 0.8 0.0 - 1.3 K/uL    Eosinophils Absolute 0.0 0.0 - 0.6 K/uL    Basophils Absolute 0.3 (H) 0.0 - 0.2 K/uL   Comprehensive Metabolic Panel w/ Reflex to MG   Result Value Ref Range    Sodium 129 (L) 136 - 145 mmol/L    Potassium reflex Magnesium 3.7 3.5 - 5.1 mmol/L    Chloride 98 (L) 99 - 110 mmol/L    CO2 16 (L) 21 - 32 mmol/L    Anion Gap 15 3 - 16    Glucose 418 (H) 70 - 99 mg/dL    BUN 11 7 - 20 mg/dL    CREATININE 0.8 (L) 0.9 - 1.3 mg/dL    GFR Non-African American >60 >60    GFR African American >60 >60    Calcium 9.9 8.3 - 10.6 mg/dL    Total Protein 7.9 6.4 - 8.2 g/dL    Albumin 4.8 3.4 - 5.0 g/dL    Albumin/Globulin Ratio 1.5 1.1 - 2.2    Total Bilirubin 0.3 0.0 - 1.0 mg/dL    Alkaline Phosphatase 158 (H) 40 - 129 U/L    ALT 17 10 - 40 U/L    AST 9 (L) 15 - 37 U/L    Globulin 3.1 g/dL   Lipase   Result Value Ref Range    Lipase 33.0 13.0 - 60.0 U/L   Urinalysis Reflex to Culture    Specimen: Urine, clean catch   Result Value Ref Range    Color, UA Straw Straw/Yellow    Clarity, UA Clear Clear    Glucose, Ur >=1000 (A) Negative mg/dL    Bilirubin Urine MODERATE (A) Negative    Ketones, Urine 15 (A) Negative mg/dL    Specific Gravity, UA <=1.005 1.005 - 1.030    Blood, Urine Negative Negative    pH, UA 6.5 5.0 - 8.0    Protein, UA Negative Negative mg/dL    Urobilinogen, Urine 0.2 <2.0 E.U./dL    Nitrite, Urine Negative Negative    Leukocyte Esterase, Urine Negative Negative    Microscopic Examination Not Indicated     Urine Type Catheter     Urine Reflex to Culture Not Indicated    Troponin   Result Value Ref Range    Troponin <0.01 <0.01 ng/mL   Beta-Hydroxybutyrate   Result Value Ref Range    Beta-Hydroxybutyrate 0.50 (H) 0.00 - 0.27 mmol/L   Blood Gas, Venous   Result Value Ref Range    pH, [facility] 7.404 7.350 - 7.450    pCO2, [facility] 29.1 (L) 40.0 - 50.0 mmHg    pO2, [facility] 56.8 (H) 25 - 40 mmHg    HCO3, Venous 17.8 (L) 23.0 - 29.0 mmol/L    Base Excess, [facility] -5.2 (L) -3.0 - 3.0 mmol/L    O2 Sat, [facility] 90 Not Established %    Carboxyhemoglobin 0.9 0.0 - 1.5 %    MetHgb, [facility] 0.3 <1.5 %    TC02 (Calc), [facility] 19 Not Established mmol/L    O2 Content, [facility] 22 Not Established VOL %    O2 Therapy Unknown    Lactic Acid, Plasma   Result Value Ref Range    Lactic Acid 3.2 (H) 0.4 - 2.0 mmol/L   Lactic Acid, Plasma   Result Value Ref Range    Lactic Acid 1.5 0.4 - 2.0 mmol/L   POCT Glucose   Result Value Ref Range    Glucose 164 mg/dL   POCT Glucose   Result Value Ref Range    POC Glucose 164 (H) 70 - 99 mg/dl    Performed on ACCU-CHEK    EKG 12 Lead   Result Value Ref Range    Ventricular Rate 108 BPM    Atrial Rate 108 BPM    P-R Interval 118 ms    QRS Duration 80 ms    Q-T Interval 300 ms    QTc Calculation (Bazett) 402 ms    P Axis 44 degrees    R Axis 58 degrees    T Axis 42 degrees    Diagnosis       Sinus tachycardiaOtherwise normal ECGNo previous ECGs availableConfirmed by FLORIN SHAW MD ([id number]) on 6/16/2021 5:51:31 PM         All other labs were within normal range or not returned as of this dictation. EKG: All EKG's are interpreted by the Emergency Department Physician in the absence of a cardiologist.  Please see their note for interpretation of EKG. RADIOLOGY:   Non-plain film images such as CT, Ultrasound and MRI are read by the radiologist. Plain radiographic images are visualized andpreliminarily interpreted by the  ED Provider with the below findings:        Interpretation perthe Radiologist below, if available at the time of this note:    CT ABDOMEN PELVIS W IV CONTRAST Additional Contrast? None   Preliminary Result   1. Liquid stool throughout the colon consistent with diarrheal state. No   acute inflammatory changes or evidence of obstruction. No evidence of   appendicitis. 2. Otherwise unremarkable CT of the abdomen and pelvis.          XR CHEST PORTABLE   Final Result   No evidence of pulmonary edema or any other acute cardiopulmonary process. CT ABDOMEN PELVIS W IV CONTRAST Additional Contrast? None    Result Date: 6/16/2021  EXAMINATION: CT OF THE ABDOMEN AND PELVIS WITH CONTRAST 6/16/2021 3:30 pm TECHNIQUE: CT of the abdomen and pelvis was performed with the administration of intravenous contrast. Multiplanar reformatted images are provided for review. Dose modulation, iterative reconstruction, and/or weight based adjustment of the mA/kV was utilized to reduce the radiation dose to as low as reasonably achievable. COMPARISON: 02/26/2016. HISTORY: ORDERING SYSTEM PROVIDED HISTORY: abdominal pain, vomiting TECHNOLOGIST PROVIDED HISTORY: Reason for exam:->abdominal pain, vomiting Additional Contrast?->None Decision Support Exception - unselect if not a suspected or confirmed emergency medical condition->Emergency Medical Condition (MA) Reason for Exam: Hyperglycemia (pt has been sick x 1 week with n/v and abdominal pain, pt states his readings have been running high at home and enroute ems states pt FS was 400 all VSS enroute, pt is tachy in 120's at this time during triage) FINDINGS: Lower Chest: No acute infiltrate at the lung bases. Organs: The liver, spleen, pancreas and adrenal glands are unremarkable. No acute biliary findings. No renal mass or significant hydronephrosis. GI/Bowel: Liquid stool throughout the colon with no pericolonic inflammatory changes. The appendix is unremarkable. No small bowel distension. The stomach and duodenal sweep are intact. Pelvis: Evaluation of the pelvis is limited by beam hardening artifact from bilateral hip prostheses. No obvious mass or significant free fluid. The prostate is not enlarged. Mild distention of the urinary bladder. Peritoneum/Retroperitoneum: The abdominal aorta is normal in caliber. No retroperitoneal adenopathy or upper abdominal ascites.  Bones/Soft Tissues: No acute osseous or soft tissue abnormality. 1. Liquid stool throughout the colon consistent with diarrheal state. No acute inflammatory changes or evidence of obstruction. No evidence of appendicitis. 2. Otherwise unremarkable CT of the abdomen and pelvis. XR CHEST PORTABLE    Result Date: 6/16/2021  EXAMINATION: ONE XRAY VIEW OF THE CHEST 6/16/2021 2:31 pm COMPARISON: Chest radiograph 10/28/2020 HISTORY: ORDERING SYSTEM PROVIDED HISTORY: chest pain TECHNOLOGIST PROVIDED HISTORY: Reason for exam:->chest pain Reason for Exam: hypergycemia FINDINGS: Lungs are clear. No effusion or pneumothorax. Cardiomediastinal silhouette is stable. No enlargement of the cardiac silhouette. No evidence of pulmonary edema or any other acute cardiopulmonary process. PROCEDURES   Unless otherwise noted below, none     Procedures    CRITICAL CARE TIME   N/A    CONSULTS:  None      EMERGENCY DEPARTMENT COURSE and DIFFERENTIAL DIAGNOSIS/MDM:   Vitals:    Vitals:    06/16/21 1502 06/16/21 1602 06/16/21 1702 06/16/21 1801   BP: (!) 136/95 130/76 119/73 133/74   Pulse: 97 98 94 96   Resp: 16 14 20 18   Temp:       TempSrc:       SpO2: 100% 100% 100% 98%   Weight:       Height:           Patient was given thefollowing medications:  Medications   0.9 % sodium chloride bolus (0 mLs Intravenous Stopped 6/16/21 1528)   ondansetron (ZOFRAN) injection 4 mg (4 mg Intravenous Given 6/16/21 1421)   0.9 % sodium chloride bolus (0 mLs Intravenous Stopped 6/16/21 1625)   iopamidol (ISOVUE-370) 76 % injection 75 mL (75 mLs Intravenous Given 6/16/21 1529)   metoclopramide (REGLAN) injection 10 mg (10 mg Intravenous Given 6/16/21 1602)       6:25 PM EDT  Patient presented for evaluation of nausea vomiting and hypoglycemia work-up was obtained. On labs white count normal 10.6. Glucose 418. Potassium 3.7. Gap normal 15. CO2 16. BUN 11. Creatinine 0.8. Alk phos 158. Lipase is normal.  Troponin normal.  Beta hydroxybutyrate 0.5. Serum pH 7.4. Lactic acid was initially elevated 3.2 he was given IV fluids and now normal 1.5. Repeat glucose after IV fluids is now 164. CT abdomen pelvis appears liquid stool in colon otherwise unremarkable. Patient is drinking water no further vomiting after Reglan here. Not in DKA. I think he can safely be discharged home close follow-up with his doctor. We discussed strict return precautions, worsening pain, inability to keep down fluids, if his glucose stays elevated or if he develops fevers. He understands and agrees. I estimate there is LOW risk for ACUTE APPENDICITIS, BOWEL OBSTRUCTION, CHOLECYSTITIS, DIVERTICULITIS, INCARCERATED HERNIA, PANCREATITIS, PERFORATED BOWEL, BOWEL ISCHEMIA, GONADAL TORSION, OR CARDIAC ISCHEMIA, thus I consider the discharge disposition reasonable. Also, there is no evidence or peritonitis, sepsis, or toxicity. FINAL IMPRESSION      1. Hyperglycemia    2. Non-intractable vomiting with nausea, unspecified vomiting type    3. Generalized abdominal pain    4. Elevated lactic acid level    5.  Hyponatremia          DISPOSITION/PLAN   DISPOSITION     PATIENT REFERREDTO:  Leonides Velásquez MD  [place] [id number] [facility]  [phone]    Schedule an appointment as soon as possible for a visit       [facility] ED  [address] [facility]  [phone]    If symptoms worsen      DISCHARGE MEDICATIONS:  New Prescriptions    METOCLOPRAMIDE (REGLAN) 10 MG TABLET    Take 1 tablet by mouth every 8 hours as needed (nausea/ vomiting)       DISCONTINUED MEDICATIONS:  Discontinued Medications    No medications on file              (Please note that portions ofthis note were completed with a voice recognition program.  Efforts were made to edit the dictations but occasionally words are mis-transcribed.)    Mp Iyer PA-C (electronically signed)            Radha Girard PA-C  06/16/21 1921

## 2021-06-16 NOTE — ED PROVIDER NOTES
I independently examined and evaluated Edyta Marie. All diagnostic, treatment, and disposition decisions were made by myself in conjunction with the advanced practice provider. For all further details of the patient's emergency department visit, please see the advanced practice provider's documentation. Primary Care Physician: Alberta Mcmahon MD    History: This is a 38 y.o. male who presents to the Emergency Department with complaint of history of type 1 diabetes presenting with 1 week history of generalized abdominal pain nausea vomiting inability to tolerate p.o. intake. On arrival to my evaluation patient otherwise well-appearing is tachycardic, no active emesis. Does complain of generalized abdominal pain, has been nonfocal since onset. Reviewed his work-up showing elevated beta hydroxybutyrate at 0.5 with a glucose in the 400s however patient's blood gas showing a pH greater than 7.4. Rate is downtrending with IV fluids initially unit greater than 120 however on my evaluation now under 100. Due to abdominal pain with symptoms without other explanation for uncontrolled diabetes aside from not checking it regularly CT abdomen was ordered. EKG troponin nonischemic appearing    Physical:     height is 5' 9\" (1.753 m) and weight is 215 lb (97.5 kg). His oral temperature is 98.8 °F (37.1 °C). His blood pressure is 136/95 (abnormal) and his pulse is 97. His respiration is 16 and oxygen saturation is 100%. 38 y.o. male   Alert oriented  Slightly tachycardic but regular  Lungs are clear  Abdomen no guarding no rigidity generalized tenderness    Impression: Hyperglycemia elevated ketones    Plan: IV fluids, insulin, CT abdomen    EKG Interpretation    The Ekg interpreted by me shows  sinus tachycardia, wtss=004 with a rate of   Axis is   Normal  QTc is  normal  Intervals and Durations are unremarkable.       ST Segments: nonspecific changes          CRITICAL CARE: There was a high probability of clinically significant/life threatening deterioration in this patient's condition which required my urgent intervention. Total critical care time was 0 minutes. This excludes any time for separately reportable procedures. Ninette Mcburney, DO  Emergency Physician        Comment: Please note this report has been produced using speech recognition software and may contain errors related to that system including errors in grammar, punctuation, and spelling, as well as words and phrases that may be inappropriate. If there are any questions or concerns please feel free to contact the dictating provider for clarification.           Ninette Mcburney, DO  06/16/21 [id number]

## 2021-06-16 NOTE — LETTER
[facility]  [address] [facility] [address]  Phone: [phone]               June 16, 2021    Patient: Patito Hammer   YOB: 1985   Date of Visit: 6/16/2021       To Whom It May Concern:    Geoffrey Chavarria was seen and treated in our emergency department on 6/16/2021.  He may return to work on 6/19/2021    Sincerely,       Umm Sheldon RN         Signature:__________________________________

## 2021-06-17 NOTE — ED NOTES
Discharge instructions reviewed with Pt. Pt verbalizes understanding at this time. Prescriptions/medications reviewed with pt at this time. VS as noted. Pt condition stable at this time. No concerns voiced.       Vin Simpson RN  06/16/21 2017

## 2021-09-07 ENCOUNTER — APPOINTMENT (OUTPATIENT)
Dept: GENERAL RADIOLOGY | Age: 36
DRG: 207 | End: 2021-09-07
Payer: COMMERCIAL

## 2021-09-07 ENCOUNTER — HOSPITAL ENCOUNTER (INPATIENT)
Age: 36
LOS: 7 days | Discharge: HOME | DRG: 207 | End: 2021-09-14
Attending: EMERGENCY MEDICINE | Admitting: INTERNAL MEDICINE
Payer: COMMERCIAL

## 2021-09-07 DIAGNOSIS — E10.10 DIABETIC KETOACIDOSIS WITHOUT COMA ASSOCIATED WITH TYPE 1 DIABETES MELLITUS (HCC): Primary | ICD-10-CM

## 2021-09-07 LAB
A/G RATIO: 1.5 (ref 1.1–2.2)
A/G RATIO: 1.6 (ref 1.1–2.2)
ALBUMIN SERPL-MCNC: 4.6 G/DL (ref 3.4–5)
ALBUMIN SERPL-MCNC: 5.1 G/DL (ref 3.4–5)
ALP BLD-CCNC: 208 U/L (ref 40–129)
ALP BLD-CCNC: 208 U/L (ref 40–129)
ALT SERPL-CCNC: 13 U/L (ref 10–40)
ALT SERPL-CCNC: 17 U/L (ref 10–40)
ANION GAP SERPL CALCULATED.3IONS-SCNC: 30 MMOL/L (ref 3–16)
ANION GAP SERPL CALCULATED.3IONS-SCNC: 32 MMOL/L (ref 3–16)
ANION GAP SERPL CALCULATED.3IONS-SCNC: 38 MMOL/L (ref 3–16)
ANISOCYTOSIS: ABNORMAL
AST SERPL-CCNC: 16 U/L (ref 15–37)
AST SERPL-CCNC: 60 U/L (ref 15–37)
BANDED NEUTROPHILS RELATIVE PERCENT: 5 % (ref 0–7)
BASE EXCESS ARTERIAL: -22.4 MMOL/L (ref -3–3)
BASE EXCESS ARTERIAL: -31.9 MMOL/L (ref -3–3)
BASE EXCESS VENOUS: -31.4 MMOL/L (ref -3–3)
BASOPHILS ABSOLUTE: 0 K/UL (ref 0–0.2)
BASOPHILS RELATIVE PERCENT: 0 %
BETA-HYDROXYBUTYRATE: >8 MMOL/L (ref 0–0.27)
BILIRUB SERPL-MCNC: 0.3 MG/DL (ref 0–1)
BILIRUB SERPL-MCNC: <0.2 MG/DL (ref 0–1)
BUN BLDV-MCNC: 19 MG/DL (ref 7–20)
BUN BLDV-MCNC: 21 MG/DL (ref 7–20)
BUN BLDV-MCNC: 21 MG/DL (ref 7–20)
CALCIUM SERPL-MCNC: 8.6 MG/DL (ref 8.3–10.6)
CALCIUM SERPL-MCNC: 8.8 MG/DL (ref 8.3–10.6)
CALCIUM SERPL-MCNC: 9.4 MG/DL (ref 8.3–10.6)
CARBOXYHEMOGLOBIN ARTERIAL: 0 % (ref 0–1.5)
CARBOXYHEMOGLOBIN ARTERIAL: 0.3 % (ref 0–1.5)
CARBOXYHEMOGLOBIN: 3.5 % (ref 0–1.5)
CHLORIDE BLD-SCNC: 100 MMOL/L (ref 99–110)
CHLORIDE BLD-SCNC: 86 MMOL/L (ref 99–110)
CHLORIDE BLD-SCNC: 94 MMOL/L (ref 99–110)
CO2: 3 MMOL/L (ref 21–32)
CO2: 5 MMOL/L (ref 21–32)
CO2: 6 MMOL/L (ref 21–32)
CREAT SERPL-MCNC: 1.4 MG/DL (ref 0.9–1.3)
CREAT SERPL-MCNC: 1.4 MG/DL (ref 0.9–1.3)
CREAT SERPL-MCNC: 1.5 MG/DL (ref 0.9–1.3)
EOSINOPHILS ABSOLUTE: 0.3 K/UL (ref 0–0.6)
EOSINOPHILS RELATIVE PERCENT: 1 %
GFR AFRICAN AMERICAN: >60
GFR NON-AFRICAN AMERICAN: 52
GFR NON-AFRICAN AMERICAN: 57
GFR NON-AFRICAN AMERICAN: 57
GLOBULIN: 3.1 G/DL
GLOBULIN: 3.1 G/DL
GLUCOSE BLD-MCNC: 317 MG/DL (ref 70–99)
GLUCOSE BLD-MCNC: 337 MG/DL (ref 70–99)
GLUCOSE BLD-MCNC: 355 MG/DL (ref 70–99)
GLUCOSE BLD-MCNC: 430 MG/DL (ref 70–99)
GLUCOSE BLD-MCNC: 506 MG/DL (ref 70–99)
GLUCOSE BLD-MCNC: 517 MG/DL (ref 70–99)
GLUCOSE BLD-MCNC: 715 MG/DL (ref 70–99)
GLUCOSE BLD-MCNC: 782 MG/DL (ref 70–99)
GLUCOSE BLD-MCNC: >600 MG/DL (ref 70–99)
GLUCOSE BLD-MCNC: >600 MG/DL (ref 70–99)
HCO3 ARTERIAL: 5 MMOL/L (ref 21–29)
HCO3 ARTERIAL: 7.3 MMOL/L (ref 21–29)
HCO3 VENOUS: 2.5 MMOL/L (ref 23–29)
HCT VFR BLD CALC: 48.6 % (ref 40.5–52.5)
HEMATOLOGY PATH CONSULT: YES
HEMOGLOBIN, ART, EXTENDED: 13.3 G/DL (ref 13.5–17.5)
HEMOGLOBIN, ART, EXTENDED: 14.6 G/DL (ref 13.5–17.5)
HEMOGLOBIN: 14 G/DL (ref 13.5–17.5)
INFLUENZA A: NOT DETECTED
INFLUENZA B: NOT DETECTED
LACTIC ACID, SEPSIS: 1.4 MMOL/L (ref 0.4–1.9)
LACTIC ACID, SEPSIS: 1.5 MMOL/L (ref 0.4–1.9)
LACTIC ACID, SEPSIS: 2.5 MMOL/L (ref 0.4–1.9)
LYMPHOCYTES ABSOLUTE: 2.5 K/UL (ref 1–5.1)
LYMPHOCYTES RELATIVE PERCENT: 8 %
MACROCYTES: ABNORMAL
MAGNESIUM: 2.1 MG/DL (ref 1.8–2.4)
MAGNESIUM: 2.3 MG/DL (ref 1.8–2.4)
MCH RBC QN AUTO: 29.8 PG (ref 26–34)
MCHC RBC AUTO-ENTMCNC: 28.8 G/DL (ref 31–36)
MCV RBC AUTO: 103.5 FL (ref 80–100)
METHEMOGLOBIN ARTERIAL: 0.2 %
METHEMOGLOBIN ARTERIAL: 0.5 %
METHEMOGLOBIN VENOUS: 0.2 %
MONOCYTES ABSOLUTE: 3.8 K/UL (ref 0–1.3)
MONOCYTES RELATIVE PERCENT: 12 %
NEUTROPHILS ABSOLUTE: 25 K/UL (ref 1.7–7.7)
NEUTROPHILS RELATIVE PERCENT: 74 %
O2 CONTENT, VEN: 20 VOL %
O2 SAT, ARTERIAL: 96.6 %
O2 SAT, ARTERIAL: 98.5 %
O2 SAT, VEN: 85 %
O2 THERAPY: ABNORMAL
PCO2 ARTERIAL: 28.5 MMHG (ref 35–45)
PCO2 ARTERIAL: 42.7 MMHG (ref 35–45)
PCO2, VEN: 15.9 MMHG (ref 40–50)
PDW BLD-RTO: 16.1 % (ref 12.4–15.4)
PERFORMED ON: ABNORMAL
PH ARTERIAL: 6.69 (ref 7.35–7.45)
PH ARTERIAL: 7.03 (ref 7.35–7.45)
PH VENOUS: 6.81 (ref 7.35–7.45)
PHOSPHORUS: 5.4 MG/DL (ref 2.5–4.9)
PHOSPHORUS: 6.8 MG/DL (ref 2.5–4.9)
PLATELET # BLD: 421 K/UL (ref 135–450)
PLATELET SLIDE REVIEW: ADEQUATE
PMV BLD AUTO: 9.4 FL (ref 5–10.5)
PO2 ARTERIAL: 172.6 MMHG (ref 75–108)
PO2 ARTERIAL: 176.1 MMHG (ref 75–108)
PO2, VEN: 85.9 MMHG (ref 25–40)
POLYCHROMASIA: ABNORMAL
POTASSIUM REFLEX MAGNESIUM: 5.2 MMOL/L (ref 3.5–5.1)
POTASSIUM REFLEX MAGNESIUM: 6.5 MMOL/L (ref 3.5–5.1)
POTASSIUM SERPL-SCNC: 4.7 MMOL/L (ref 3.5–5.1)
RBC # BLD: 4.7 M/UL (ref 4.2–5.9)
SARS-COV-2 RNA, RT PCR: NOT DETECTED
SLIDE REVIEW: ABNORMAL
SODIUM BLD-SCNC: 127 MMOL/L (ref 136–145)
SODIUM BLD-SCNC: 131 MMOL/L (ref 136–145)
SODIUM BLD-SCNC: 136 MMOL/L (ref 136–145)
TCO2 ARTERIAL: 6.3 MMOL/L
TCO2 ARTERIAL: 8.2 MMOL/L
TCO2 CALC VENOUS: 3 MMOL/L
TOTAL PROTEIN: 7.7 G/DL (ref 6.4–8.2)
TOTAL PROTEIN: 8.2 G/DL (ref 6.4–8.2)
TOXIC GRANULATION: PRESENT
TROPONIN: <0.01 NG/ML
WBC # BLD: 31.6 K/UL (ref 4–11)

## 2021-09-07 PROCEDURE — 2500000003 HC RX 250 WO HCPCS

## 2021-09-07 PROCEDURE — 2000000000 HC ICU R&B

## 2021-09-07 PROCEDURE — 83735 ASSAY OF MAGNESIUM: CPT

## 2021-09-07 PROCEDURE — 2580000003 HC RX 258: Performed by: INTERNAL MEDICINE

## 2021-09-07 PROCEDURE — 2580000003 HC RX 258: Performed by: PHYSICIAN ASSISTANT

## 2021-09-07 PROCEDURE — 94761 N-INVAS EAR/PLS OXIMETRY MLT: CPT

## 2021-09-07 PROCEDURE — 99291 CRITICAL CARE FIRST HOUR: CPT

## 2021-09-07 PROCEDURE — 2500000003 HC RX 250 WO HCPCS: Performed by: INTERNAL MEDICINE

## 2021-09-07 PROCEDURE — 96365 THER/PROPH/DIAG IV INF INIT: CPT

## 2021-09-07 PROCEDURE — 80053 COMPREHEN METABOLIC PANEL: CPT

## 2021-09-07 PROCEDURE — 6370000000 HC RX 637 (ALT 250 FOR IP): Performed by: PHYSICIAN ASSISTANT

## 2021-09-07 PROCEDURE — 84484 ASSAY OF TROPONIN QUANT: CPT

## 2021-09-07 PROCEDURE — 82010 KETONE BODYS QUAN: CPT

## 2021-09-07 PROCEDURE — 6360000002 HC RX W HCPCS: Performed by: INTERNAL MEDICINE

## 2021-09-07 PROCEDURE — 6370000000 HC RX 637 (ALT 250 FOR IP): Performed by: INTERNAL MEDICINE

## 2021-09-07 PROCEDURE — 94002 VENT MGMT INPAT INIT DAY: CPT

## 2021-09-07 PROCEDURE — 31500 INSERT EMERGENCY AIRWAY: CPT

## 2021-09-07 PROCEDURE — 6370000000 HC RX 637 (ALT 250 FOR IP): Performed by: EMERGENCY MEDICINE

## 2021-09-07 PROCEDURE — 5A1955Z RESPIRATORY VENTILATION, GREATER THAN 96 CONSECUTIVE HOURS: ICD-10-PCS | Performed by: INTERNAL MEDICINE

## 2021-09-07 PROCEDURE — 87040 BLOOD CULTURE FOR BACTERIA: CPT

## 2021-09-07 PROCEDURE — 2500000003 HC RX 250 WO HCPCS: Performed by: EMERGENCY MEDICINE

## 2021-09-07 PROCEDURE — 84100 ASSAY OF PHOSPHORUS: CPT

## 2021-09-07 PROCEDURE — 83036 HEMOGLOBIN GLYCOSYLATED A1C: CPT

## 2021-09-07 PROCEDURE — 87636 SARSCOV2 & INF A&B AMP PRB: CPT

## 2021-09-07 PROCEDURE — 82803 BLOOD GASES ANY COMBINATION: CPT

## 2021-09-07 PROCEDURE — 71045 X-RAY EXAM CHEST 1 VIEW: CPT

## 2021-09-07 PROCEDURE — 83605 ASSAY OF LACTIC ACID: CPT

## 2021-09-07 PROCEDURE — 85025 COMPLETE CBC W/AUTO DIFF WBC: CPT

## 2021-09-07 PROCEDURE — 6360000002 HC RX W HCPCS: Performed by: EMERGENCY MEDICINE

## 2021-09-07 PROCEDURE — 36415 COLL VENOUS BLD VENIPUNCTURE: CPT

## 2021-09-07 PROCEDURE — 2700000000 HC OXYGEN THERAPY PER DAY

## 2021-09-07 PROCEDURE — 93005 ELECTROCARDIOGRAM TRACING: CPT | Performed by: PHYSICIAN ASSISTANT

## 2021-09-07 PROCEDURE — 0BH17EZ INSERTION OF ENDOTRACHEAL AIRWAY INTO TRACHEA, VIA NATURAL OR ARTIFICIAL OPENING: ICD-10-PCS | Performed by: INTERNAL MEDICINE

## 2021-09-07 RX ORDER — PROPOFOL 10 MG/ML
5-50 INJECTION, EMULSION INTRAVENOUS
Status: DISCONTINUED | OUTPATIENT
Start: 2021-09-07 | End: 2021-09-12

## 2021-09-07 RX ORDER — DEXTROSE MONOHYDRATE 25 G/50ML
12.5 INJECTION, SOLUTION INTRAVENOUS PRN
Status: DISCONTINUED | OUTPATIENT
Start: 2021-09-07 | End: 2021-09-14 | Stop reason: HOSPADM

## 2021-09-07 RX ORDER — SODIUM CHLORIDE 9 MG/ML
INJECTION, SOLUTION INTRAVENOUS CONTINUOUS
Status: DISCONTINUED | OUTPATIENT
Start: 2021-09-07 | End: 2021-09-08 | Stop reason: CLARIF

## 2021-09-07 RX ORDER — DEXTROSE AND SODIUM CHLORIDE 5; .45 G/100ML; G/100ML
INJECTION, SOLUTION INTRAVENOUS CONTINUOUS PRN
Status: DISCONTINUED | OUTPATIENT
Start: 2021-09-07 | End: 2021-09-09

## 2021-09-07 RX ORDER — MIDAZOLAM HYDROCHLORIDE 1 MG/ML
2 INJECTION INTRAMUSCULAR; INTRAVENOUS EVERY 4 HOURS PRN
Status: DISCONTINUED | OUTPATIENT
Start: 2021-09-07 | End: 2021-09-12

## 2021-09-07 RX ORDER — CARBOXYMETHYLCELLULOSE SODIUM 10 MG/ML
1 GEL OPHTHALMIC EVERY 4 HOURS
Status: DISCONTINUED | OUTPATIENT
Start: 2021-09-07 | End: 2021-09-12

## 2021-09-07 RX ORDER — POTASSIUM CHLORIDE 7.45 MG/ML
10 INJECTION INTRAVENOUS PRN
Status: DISCONTINUED | OUTPATIENT
Start: 2021-09-07 | End: 2021-09-09

## 2021-09-07 RX ORDER — ONDANSETRON 2 MG/ML
4 INJECTION INTRAMUSCULAR; INTRAVENOUS EVERY 6 HOURS PRN
Status: DISCONTINUED | OUTPATIENT
Start: 2021-09-07 | End: 2021-09-14 | Stop reason: HOSPADM

## 2021-09-07 RX ORDER — MINERAL OIL AND WHITE PETROLATUM 150; 830 MG/G; MG/G
OINTMENT OPHTHALMIC EVERY 4 HOURS
Status: DISCONTINUED | OUTPATIENT
Start: 2021-09-07 | End: 2021-09-12

## 2021-09-07 RX ORDER — CHLORHEXIDINE GLUCONATE 0.12 MG/ML
15 RINSE ORAL 2 TIMES DAILY
Status: DISCONTINUED | OUTPATIENT
Start: 2021-09-07 | End: 2021-09-12

## 2021-09-07 RX ORDER — 0.9 % SODIUM CHLORIDE 0.9 %
15 INTRAVENOUS SOLUTION INTRAVENOUS ONCE
Status: COMPLETED | OUTPATIENT
Start: 2021-09-07 | End: 2021-09-07

## 2021-09-07 RX ORDER — MAGNESIUM SULFATE 1 G/100ML
1000 INJECTION INTRAVENOUS PRN
Status: DISCONTINUED | OUTPATIENT
Start: 2021-09-07 | End: 2021-09-09

## 2021-09-07 RX ORDER — 0.9 % SODIUM CHLORIDE 0.9 %
1000 INTRAVENOUS SOLUTION INTRAVENOUS ONCE
Status: COMPLETED | OUTPATIENT
Start: 2021-09-07 | End: 2021-09-07

## 2021-09-07 RX ORDER — ETOMIDATE 2 MG/ML
40 INJECTION INTRAVENOUS ONCE
Status: COMPLETED | OUTPATIENT
Start: 2021-09-07 | End: 2021-09-07

## 2021-09-07 RX ORDER — FENTANYL CITRATE 50 UG/ML
25 INJECTION, SOLUTION INTRAMUSCULAR; INTRAVENOUS
Status: DISCONTINUED | OUTPATIENT
Start: 2021-09-07 | End: 2021-09-12

## 2021-09-07 RX ORDER — SODIUM CHLORIDE 9 MG/ML
1000 INJECTION, SOLUTION INTRAVENOUS CONTINUOUS
Status: DISCONTINUED | OUTPATIENT
Start: 2021-09-07 | End: 2021-09-08

## 2021-09-07 RX ORDER — POLYETHYLENE GLYCOL 3350 17 G/17G
17 POWDER, FOR SOLUTION ORAL DAILY PRN
Status: DISCONTINUED | OUTPATIENT
Start: 2021-09-07 | End: 2021-09-14 | Stop reason: HOSPADM

## 2021-09-07 RX ORDER — FENTANYL CITRATE 50 UG/ML
50 INJECTION, SOLUTION INTRAMUSCULAR; INTRAVENOUS ONCE
Status: COMPLETED | OUTPATIENT
Start: 2021-09-07 | End: 2021-09-07

## 2021-09-07 RX ORDER — ROCURONIUM BROMIDE 10 MG/ML
80 INJECTION, SOLUTION INTRAVENOUS ONCE
Status: COMPLETED | OUTPATIENT
Start: 2021-09-07 | End: 2021-09-07

## 2021-09-07 RX ADMIN — ENOXAPARIN SODIUM 40 MG: 40 INJECTION SUBCUTANEOUS at 21:00

## 2021-09-07 RX ADMIN — PROPOFOL 10 MCG/KG/MIN: 10 INJECTION, EMULSION INTRAVENOUS at 16:53

## 2021-09-07 RX ADMIN — INSULIN HUMAN 10 UNITS: 100 INJECTION, SOLUTION PARENTERAL at 16:55

## 2021-09-07 RX ADMIN — ETOMIDATE 20 MG: 2 INJECTION INTRAVENOUS at 16:30

## 2021-09-07 RX ADMIN — FAMOTIDINE 20 MG: 10 INJECTION, SOLUTION INTRAVENOUS at 20:41

## 2021-09-07 RX ADMIN — FENTANYL CITRATE 25 MCG: 0.05 INJECTION, SOLUTION INTRAMUSCULAR; INTRAVENOUS at 20:34

## 2021-09-07 RX ADMIN — SODIUM CHLORIDE 1000 ML: 9 INJECTION, SOLUTION INTRAVENOUS at 16:11

## 2021-09-07 RX ADMIN — Medication 15 ML: at 21:29

## 2021-09-07 RX ADMIN — SODIUM CHLORIDE 1000 ML: 9 INJECTION, SOLUTION INTRAVENOUS at 16:12

## 2021-09-07 RX ADMIN — SODIUM CHLORIDE 1463 ML: 9 INJECTION, SOLUTION INTRAVENOUS at 17:03

## 2021-09-07 RX ADMIN — SODIUM CHLORIDE 1000 ML: 0.9 INJECTION, SOLUTION INTRAVENOUS at 20:51

## 2021-09-07 RX ADMIN — MIDAZOLAM HYDROCHLORIDE 2 MG: 1 INJECTION, SOLUTION INTRAMUSCULAR; INTRAVENOUS at 22:02

## 2021-09-07 RX ADMIN — CARBOXYMETHYLCELLULOSE SODIUM 1 DROP: 10 GEL OPHTHALMIC at 21:26

## 2021-09-07 RX ADMIN — SODIUM CHLORIDE: 9 INJECTION, SOLUTION INTRAVENOUS at 18:24

## 2021-09-07 RX ADMIN — SODIUM BICARBONATE 50 MEQ: 84 INJECTION, SOLUTION INTRAVENOUS at 16:49

## 2021-09-07 RX ADMIN — ROCURONIUM BROMIDE 80 MG: 10 SOLUTION INTRAVENOUS at 16:30

## 2021-09-07 RX ADMIN — Medication 1 MG/HR: at 21:07

## 2021-09-07 RX ADMIN — SODIUM CHLORIDE 0.1 UNITS/KG/HR: 9 INJECTION, SOLUTION INTRAVENOUS at 17:05

## 2021-09-07 RX ADMIN — POTASSIUM CHLORIDE 10 MEQ: 7.46 INJECTION, SOLUTION INTRAVENOUS at 22:16

## 2021-09-07 RX ADMIN — PROPOFOL 50 MCG/KG/MIN: 10 INJECTION, EMULSION INTRAVENOUS at 21:15

## 2021-09-07 RX ADMIN — FENTANYL CITRATE 50 MCG: 50 INJECTION, SOLUTION INTRAMUSCULAR; INTRAVENOUS at 21:23

## 2021-09-07 RX ADMIN — WHITE PETROLATUM 57.7 %-MINERAL OIL 31.9 % EYE OINTMENT: at 21:28

## 2021-09-07 RX ADMIN — MIDAZOLAM HYDROCHLORIDE 2 MG: 1 INJECTION, SOLUTION INTRAMUSCULAR; INTRAVENOUS at 20:10

## 2021-09-07 RX ADMIN — POTASSIUM CHLORIDE 10 MEQ: 7.46 INJECTION, SOLUTION INTRAVENOUS at 22:59

## 2021-09-07 ASSESSMENT — PULMONARY FUNCTION TESTS
PIF_VALUE: 13
PIF_VALUE: 18

## 2021-09-07 ASSESSMENT — PAIN SCALES - GENERAL
PAINLEVEL_OUTOF10: 8
PAINLEVEL_OUTOF10: 8
PAINLEVEL_OUTOF10: 0

## 2021-09-07 NOTE — H&P
Hospital Medicine History & Physical      PCP: Soo Philippe MD    Date of Admission: 9/7/2021    Date of Service: Pt seen/examined on 9/7/2021    Chief Complaint: Diabetic ketoacidosis high glucose level    History Of Present Illness:    38 y.o. male with multiple medical comorbidities presented to the emergency department with elevated blood sugars and altered mental status. Is brought in by EMS altered. In the emergency department he was profoundly acidotic, altered, tachypneic and tachycardic. He was intubated to protect his airway and admitted to the ICU for further care. Past Medical History:          Diagnosis Date    Anesthesia complication     BLOOD PRESSURE DROP IN OR DURING THR    Celiac disease     Dermatomyositis ([address]     Last attack 2009    Diabetes mellitus ([address]     TYPE I    Fibromyalgia     Hyperlipidemia     Hypertension     Juvenile rheumatoid arthritis ([address]     Movement disorder     DERMATOMYOSITIS/IN REMISSION    Rheumatoid arthritis(714.0)     Vertebral basilar insufficiency     symptoms related to JRA in cervical area    Wears glasses        Past Surgical History:          Procedure Laterality Date    HIP SURGERY      bilateral    JOINT REPLACEMENT Bilateral     THR    JOINT REPLACEMENT Left 6/3/2016    Revision left lateral total hip replacement    KNEE ARTHROSCOPY Right     KNEE ARTHROSCOPY Left 9/25/2013    medial & lateral Meniscectomy    LIVER BIOPSY      MUSCLE BIOPSY      TYMPANOSTOMY TUBE PLACEMENT      WRIST SURGERY Right     CYST       Medications Prior to Admission:      Prior to Admission medications    Medication Sig Start Date End Date Taking?  Authorizing Provider   metoclopramide (REGLAN) 10 MG tablet Take 1 tablet by mouth every 8 hours as needed (nausea/ vomiting) 6/16/21   Otto Brunner DO   ondansetron (ZOFRAN) 4 MG tablet Take 1 tablet by mouth every 8 hours as needed for Nausea 10/28/20   FAIZAN Redding - CNP   insulin aspart (NOVOLOG) 100 UNIT/ML injection vial Inject 5 Units into the skin 3 times daily (before meals) 5/13/20   Shanique Salcido MD   tiZANidine (ZANAFLEX) 4 MG tablet Take 4 mg by mouth every 6 hours as needed    Historical Provider, MD   buPROPion (WELLBUTRIN SR) 100 MG SR tablet Take 100 mg by mouth daily. Historical Provider, MD   insulin detemir (LEVEMIR) 100 UNIT/ML injection vial Inject 40 Units into the skin 2 times daily     Historical Provider, MD   Pantoprazole Sodium (PROTONIX) 40 MG PACK packet Take 40 mg by mouth daily. Historical Provider, MD   pregabalin (LYRICA) 150 MG capsule Take 300 mg by mouth 2 times daily. Historical Provider, MD   lisinopril (PRINIVIL;ZESTRIL) 10 MG tablet Take 30 mg by mouth daily. Historical Provider, MD   tamsulosin (FLOMAX) 0.4 MG capsule Take 0.4 mg by mouth nightly     Historical Provider, MD   duloxetine (CYMBALTA) 60 MG capsule Take 60 mg by mouth daily. Historical Provider, MD   LOVASTATIN PO Take 20 mg by mouth nightly. 2/23/11   Historical Provider, MD   vitamin D (ERGOCALCIFEROL) 40149 UNIT CAPS capsule Take 50,000 Units by mouth once a week. On thursdays 3/5/11   Historical Provider, MD   hydroxychloroquine (PLAQUENIL) 200 MG tablet Take 200 mg by mouth 2 times daily. Historical Provider, MD       Allergies:  Aspirin    Social History:      TOBACCO:   reports that he has never smoked. He has never used smokeless tobacco.  ETOH:   reports current alcohol use.       Family History:          Problem Relation Age of Onset    High Blood Pressure Mother     Diabetes Father     Heart Disease Maternal Grandmother        REVIEW OF SYSTEMS:   Could not obtain as patient is currently intubated    PHYSICAL EXAM:    BP (!) 145/86   Pulse 142   Resp 10   Wt 215 lb (97.5 kg)   SpO2 100%   BMI 31.75 kg/m²     General appearance: Intubated and sedated  HEENT: ET tube in place  Neck: Supple, no adenopathy  Respiratory: Mechanical breath sounds  Cardiovascular:  Regular rate and rhythm with normal S1/S2 without murmurs, rubs or gallops. Abdomen: Soft, non-tender, non-distended with normal bowel sounds. Musculoskeletal:  No clubbing, cyanosis or edema bilaterally. Full range of motion without deformity. Skin: Skin color, texture, turgor normal.  No rashes or lesions. Neurologic: Could not examine  Psychiatric: Cannot examine  Capillary Refill: Brisk,< 3 seconds   Peripheral Pulses: +2 palpable, equal bilaterally       Labs:   Recent Labs     09/07/21  1607   WBC 31.6*   HGB 14.0   HCT 48.6        Recent Labs     09/07/21  1601 09/07/21  1607 09/07/21  1723   *  --  131*   K 6.5*  --  5.2*   CL 86*  --  94*   CO2 3*  --  5*   BUN 19  --  21*   CREATININE 1.4*  --  1.4*   CALCIUM 9.4  --  8.6   PHOS  --  6.8*  --      Recent Labs     09/07/21  1601 09/07/21  1723   AST 16 60*   ALT 13 17   BILITOT <0.2 0.3   ALKPHOS 208* 208*     No results for input(s): INR in the last 72 hours.   Recent Labs     09/07/21  1601   TROPONINI <0.01       Urinalysis:      Lab Results   Component Value Date    NITRU Negative 06/16/2021    WBCUA 3-5 10/28/2020    BACTERIA 1+ 10/28/2020    RBCUA 3-4 10/28/2020    BLOODU Negative 06/16/2021    SPECGRAV <=1.005 06/16/2021    GLUCOSEU >=1000 06/16/2021    GLUCOSEU NEGATIVE 02/23/2011       Radiology:   XR CHEST PORTABLE   Final Result   Negative low lung volume portable chest             ASSESSMENT:  Acute respiratory failure hypoxia  Diabetic ketoacidosis type I  Acute kidney injury  Pseudohyponatremia  Juvenile rheumatoid arthritis  Obesity    PLAN:  Admit to the ICU  Intubated and sedated, mechanical ventilation per pulmonary and critical care  -Sedation with propofol and Versed  -Aggressive IV fluid resuscitation with IV insulin therapy per DKA protocol.  -Electrolyte replacement      DVT Prophylaxis: Heparin  Diet: Diet NPO  Code Status: Prior    Dispo -continue care in ICU      Thank you for the opportunity to be involved in this patient's care.       (Please note that portions of this note were completed with a voice recognition program. Efforts were made to edit the dictations but occasionally words are mis-transcribed.)

## 2021-09-07 NOTE — ED PROVIDER NOTES
[address] ED  EMERGENCY DEPARTMENT ENCOUNTER        Pt Name: Yasmine Bruce  MRN: [id number]  [place] 1985  Date of evaluation: 9/7/2021  Provider: Fiona Edwards PA-C  PCP: Nabeel Forrester MD  Note Started: 4:11 PM EDT       I have seen and evaluated this patient with my supervising physician Juan Herrera MD.    [address] [place] [address]       Chief Complaint   Patient presents with    Diabetic Ketoacidosis     sob crictial high glucose       HISTORY OF PRESENT ILLNESS   (Location, Timing/Onset, Context/Setting, Quality, Duration, Modifying Factors, Severity, Associated Signs and Symptoms)  Note limiting factors. Chief Complaint: Hyperglycemia    Yasmine Bruce is a 38 y.o. male with a past medical history of type I diabetic, rheumatoid arthritis, hypertension, hyperlipidemia, schizoaffective disorder brought in today by EMS for evaluation of altered mental status. His blood sugar per EMS was running high. He did receive 1 L fluid prior to arrival via EMS. Patient is a poor historian given change in mental status therefore the rest of the history is difficult to obtain. He is on insulin for his diabetes. Nursing Notes were all reviewed and agreed with or any disagreements were addressed in the HPI. REVIEW OF SYSTEMS    (2-9 systems for level 4, 10 or more for level 5)     Review of Systems   Unable to perform ROS: Mental status change       Positives and Pertinent negatives as per HPI. Except as noted above in the ROS, all other systems were reviewed and negative.        PAST MEDICAL HISTORY     Past Medical History:   Diagnosis Date    Anesthesia complication     BLOOD PRESSURE DROP IN OR DURING THR    Celiac disease     [facility] ([facility]     Last attack 2009    Diabetes mellitus ([facility]     TYPE I    Fibromyalgia     Hyperlipidemia     Hypertension     Juvenile rheumatoid arthritis ([facility]     Movement disorder     DERMATOMYOSITIS/IN REMISSION    Rheumatoid arthritis(714.0)     Vertebral basilar insufficiency     symptoms related to JRA in cervical area    Wears glasses          SURGICAL HISTORY     Past Surgical History:   Procedure Laterality Date    HIP SURGERY      bilateral    JOINT REPLACEMENT Bilateral     THR    JOINT REPLACEMENT Left 6/3/2016    Revision left lateral total hip replacement    KNEE ARTHROSCOPY Right     KNEE ARTHROSCOPY Left 9/25/2013    medial & lateral Meniscectomy    LIVER BIOPSY      MUSCLE BIOPSY      TYMPANOSTOMY TUBE PLACEMENT      WRIST SURGERY Right     CYST         CURRENTMEDICATIONS       Previous Medications    BUPROPION (WELLBUTRIN SR) 100 MG SR TABLET    Take 100 mg by mouth daily. DULOXETINE (CYMBALTA) 60 MG CAPSULE    Take 60 mg by mouth daily. HYDROXYCHLOROQUINE (PLAQUENIL) 200 MG TABLET    Take 200 mg by mouth 2 times daily. INSULIN ASPART (NOVOLOG) 100 UNIT/ML INJECTION VIAL    Inject 5 Units into the skin 3 times daily (before meals)    INSULIN DETEMIR (LEVEMIR) 100 UNIT/ML INJECTION VIAL    Inject 40 Units into the skin 2 times daily     LISINOPRIL (PRINIVIL;ZESTRIL) 10 MG TABLET    Take 30 mg by mouth daily. LOVASTATIN PO    Take 20 mg by mouth nightly. METOCLOPRAMIDE (REGLAN) 10 MG TABLET    Take 1 tablet by mouth every 8 hours as needed (nausea/ vomiting)    ONDANSETRON (ZOFRAN) 4 MG TABLET    Take 1 tablet by mouth every 8 hours as needed for Nausea    PANTOPRAZOLE SODIUM (PROTONIX) 40 MG PACK PACKET    Take 40 mg by mouth daily. PREGABALIN (LYRICA) 150 MG CAPSULE    Take 300 mg by mouth 2 times daily. TAMSULOSIN (FLOMAX) 0.4 MG CAPSULE    Take 0.4 mg by mouth nightly     TIZANIDINE (ZANAFLEX) 4 MG TABLET    Take 4 mg by mouth every 6 hours as needed    VITAMIN D (ERGOCALCIFEROL) 51120 UNIT CAPS CAPSULE    Take 50,000 Units by mouth once a week.  On thursdays         ALLERGIES     Aspirin    FAMILYHISTORY       Family History   Problem Relation Age of Onset    High Blood Pressure Mother  Diabetes Father     Heart Disease Maternal Grandmother           SOCIAL HISTORY       Social History     Tobacco Use    Smoking status: Never Smoker    Smokeless tobacco: Never Used   Vaping Use    Vaping Use: Never used   Substance Use Topics    Alcohol use: Yes     Comment: occasionally    Drug use: No       SCREENINGS             PHYSICAL EXAM    (up to 7 for level 4, 8 or more for level 5)     ED Triage Vitals   BP Temp Temp src Pulse Resp SpO2 Height Weight   -- -- -- -- -- -- -- --       Physical Exam  Vitals and nursing note reviewed. Constitutional:       General: He is not in acute distress. Appearance: He is ill-appearing. He is not toxic-appearing or diaphoretic. HENT:      Head: Normocephalic and atraumatic. Nose: Nose normal.   Eyes:      General:         Right eye: No discharge. Left eye: No discharge. Cardiovascular:      Rate and Rhythm: Regular rhythm. Tachycardia present. Pulses:           Radial pulses are 2+ on the right side and 2+ on the left side. Heart sounds: Normal heart sounds. No murmur heard. No gallop. Pulmonary:      Effort: Pulmonary effort is normal. Tachypnea present. No respiratory distress. Breath sounds: Normal breath sounds. No decreased breath sounds, wheezing, rhonchi or rales. Comments: + Kussmaul respirations   Chest:      Chest wall: No tenderness. Abdominal:      General: Abdomen is flat. Bowel sounds are normal.      Palpations: Abdomen is soft. Tenderness: There is no abdominal tenderness. There is no right CVA tenderness, left CVA tenderness, guarding or rebound. Negative signs include Conley's sign and McBurney's sign. Musculoskeletal:         General: No deformity. Normal range of motion. Cervical back: Normal range of motion and neck supple. Right lower leg: No edema. Left lower leg: No edema. Skin:     General: Skin is warm and dry.    Neurological:      General: No focal deficit present. Mental Status: He is confused. GCS: GCS eye subscore is 4. GCS verbal subscore is 5. GCS motor subscore is 6. Cranial Nerves: Cranial nerves are intact.    Psychiatric:         Behavior: Behavior normal.         DIAGNOSTIC RESULTS   LABS:    Labs Reviewed   CBC WITH AUTO DIFFERENTIAL - Abnormal; Notable for the following components:       Result Value    WBC 31.6 (*)     .5 (*)     MCHC 28.8 (*)     RDW 16.1 (*)     Neutrophils Absolute 25.0 (*)     Monocytes Absolute 3.8 (*)     Toxic Granulation Present (*)     Anisocytosis Occasional (*)     Macrocytes 1+ (*)     Polychromasia Occasional (*)     All other components within normal limits    Narrative:     Performed at:  [facility],  [place], [facility]   Phone [phone]   COMPREHENSIVE METABOLIC PANEL W/ REFLEX TO MG FOR LOW K - Abnormal; Notable for the following components:    Sodium 127 (*)     Potassium reflex Magnesium 6.5 (*)     Chloride 86 (*)     CO2 3 (*)     Anion Gap 38 (*)     Glucose 782 (*)     CREATININE 1.4 (*)     GFR Non- 57 (*)     Albumin 5.1 (*)     Alkaline Phosphatase 208 (*)     All other components within normal limits    Narrative:     CALL  [place]  [facility] tel. [phone],  Chemistry results called to and read back by Karla Everett RN, 09/07/2021  16:40, by [facility]  Chemistry results called to and read back by Karla Everett RN, 09/07/2021  16:39, by [facility]  Performed at:  [facility],  [place], [place]   Phone [phone]   BLOOD GAS, VENOUS - Abnormal; Notable for the following components:    pH, [facility] 6.808 (*)     pCO2, [facility] 15.9 (*)     pO2, [facility] 85.9 (*)     HCO3, Venous 2.5 (*)     Base Excess, [facility] -31.4 (*)     Carboxyhemoglobin 3.5 (*)     All other components within normal limits    Narrative:     Mitchell Velasco tel. [phone],  Chemistry results called to and read back by [facility]  [facility],  [place], [facility]   Phone [phone]   POCT GLUCOSE - Abnormal; Notable for the following components:    POC Glucose >600 (*)     All other components within normal limits    Narrative:     Performed at:  [facility],  [place], [facility]   Phone [phone] & INFLUENZA COMBO   CULTURE, BLOOD 1   CULTURE, BLOOD 2   TROPONIN    Narrative:     Mitchell Velasco tel. [phone],  Chemistry results called to and read back by Karla Everett RN, 09/07/2021  16:39, by [facility]  Performed at:  [facility],  [place], [facility]   Phone [phone]   MAGNESIUM    Narrative:     Performed at:  [facility],  [place], [facility]   Phone [phone]   URINE RT REFLEX TO CULTURE   LACTATE, SEPSIS   BASIC METABOLIC PANEL   BASIC METABOLIC PANEL   MAGNESIUM   MAGNESIUM   PHOSPHORUS   PHOSPHORUS   BASIC METABOLIC PANEL   MAGNESIUM   PHOSPHORUS   COMPREHENSIVE METABOLIC PANEL W/ REFLEX TO MG FOR LOW K   LACTATE, SEPSIS   LACTATE, SEPSIS   POTASSIUM   POCT GLUCOSE   POCT GLUCOSE   POCT GLUCOSE   POCT GLUCOSE   POCT GLUCOSE   POCT GLUCOSE   POCT GLUCOSE   POCT GLUCOSE   POCT GLUCOSE   POCT GLUCOSE   POCT GLUCOSE   POCT GLUCOSE       When ordered only abnormal lab results are displayed. All other labs were within normal range or not returned as of this dictation. EKG: When ordered, EKG's are interpreted by the Emergency Department Physician in the absence of a cardiologist.  Please see their note for interpretation of EKG.     RADIOLOGY:   Non-plain film images such as CT, Ultrasound and MRI are read by the radiologist. Plain radiographic images are visualized and preliminarily interpreted by the ED Provider with the below findings:        Interpretation per the Radiologist below, if available at the time of this note:    XR CHEST PORTABLE   Final Result   Negative low lung volume portable chest           No results found. PROCEDURES   Unless otherwise noted below, none     Procedures    CRITICAL CARE TIME   Total Critical Care time was 35 minutes, excluding separately reportable procedures. There was a high probability of clinically significant/life threatening deterioration in the patient's condition which required my urgent intervention.         CONSULTS:  IP CONSULT TO DIETITIAN  IP CONSULT TO HOSPITALIST      EMERGENCY DEPARTMENT COURSE and DIFFERENTIAL DIAGNOSIS/MDM:   Vitals:    Vitals:    09/07/21 1626 09/07/21 1651   BP:  (!) 173/123   Pulse:  148   Resp:  26   SpO2:  100%   Weight: 215 lb (97.5 kg)        Patient was given the following medications:  Medications   ondansetron (ZOFRAN) injection 4 mg (has no administration in time range)   propofol injection (20 mcg/kg/min × 97.5 kg IntraVENous Rate/Dose Change 9/7/21 1700)   chlorhexidine (PERIDEX) 0.12 % solution 15 mL (has no administration in time range)   famotidine (PEPCID) injection 20 mg (has no administration in time range)   dextrose 50 % IV solution (has no administration in time range)   potassium chloride 10 mEq/100 mL IVPB (Peripheral Line) (has no administration in time range)   magnesium sulfate 1000 mg in dextrose 5% 100 mL IVPB (has no administration in time range)   sodium phosphate 10 mmol in dextrose 5 % 250 mL IVPB (has no administration in time range)     Or   sodium phosphate 15 mmol in dextrose 5 % 250 mL IVPB (has no administration in time range)     Or   sodium phosphate 20 mmol in dextrose 5 % 500 mL IVPB (has no administration in time range)   0.9 % sodium chloride bolus (1,463 mLs IntraVENous New Bag 9/7/21 1703)     Followed by   0.9 % sodium chloride infusion (has no administration in time range)   dextrose 5 % and 0.45 % sodium chloride infusion (has no administration in time range)   insulin regular (HUMULIN R;NOVOLIN R) 100 Units in sodium chloride 0.9 % 100 mL infusion (0.1 Units/kg/hr × 97.5 kg IntraVENous New Bag 9/7/21 1705)   0.9 % sodium chloride bolus (1,000 mLs IntraVENous New Bag 9/7/21 1611)   0.9 % sodium chloride bolus (1,000 mLs IntraVENous New Bag 9/7/21 1612)   rocuronium (ZEMURON) injection 80 mg (80 mg IntraVENous Given 9/7/21 1630)   etomidate (AMIDATE) injection 40 mg (20 mg IntraVENous Given 9/7/21 1630)   sodium bicarbonate 8.4 % injection (50 mEq IntraVENous Given 9/7/21 1649)   insulin regular (HUMULIN R;NOVOLIN R) injection 10 Units (10 Units IntraVENous Given 9/7/21 1655)           Patient brought in today by EMS for evaluation of altered mental status. He is a type I diabetic on insulin was found to have elevated blood sugars per EMS. He did receive a liter of fluids prior to arrival.  On exam he is altered therefore is difficult to obtain a history. He is tachycardic and tachypnea. Satting at 99%. Appears ill. Point Of care glucose over 600. VBG shows a pH of 6.8 CO2 of 15.99. Potassium of 6.5. Sodium of 127 blood glucose of 784. Creatinine of 1.4 GFR of 57. Anion gap of 38. Lactic acidosis of 2.5. Troponin is less than 0.01. EKG reviewed by my attending see note for dictation. Beta hydroxybutyrate elevated greater than 8. Patient intubated and sedated here in ED. Please see my attendings note for procedure. CBC shows leukocytosis of 31.6. Hemoglobin of 14. This x-ray is negative at this time no acute cardiopulmonary findings. DKA protocol started here in the ED. Patient given fluids and insulin. We will plan to admit to ICU. Will consult hospitalist at this time. I spoke to Dr. Jen Sage hospitalist who did accept this admission. Patient stable at this time. FINAL IMPRESSION      1. Diabetic ketoacidosis without coma associated with type 1 diabetes mellitus ([facility]          DISPOSITION/PLAN   DISPOSITION        PATIENT REFERRED TO:  No follow-up provider specified.     DISCHARGE MEDICATIONS:  New Prescriptions    No medications on file       DISCONTINUED MEDICATIONS:  Discontinued Medications    No medications on file              (Please note that portions of this note were completed with a voice recognition program.  Efforts were made to edit the dictations but occasionally words are mis-transcribed.)    Yates Lesch, PA-C (electronically signed)            Yates Lesch, PA-C  09/07/21 8497

## 2021-09-07 NOTE — ED NOTES
1654- [facility] to [facility] [place]. Pending sale to [facility]. 1734- [employer] to [place] [address] with callback.    Greg Chung  09/07/21 [facility]  09/07/21 1734

## 2021-09-07 NOTE — PLAN OF CARE
Severe DKA with acidosis, AMS  Ph of 6.8. glucose >700, hyperkalemia, leucocytosis  Currently  On DKA protocol and vent support

## 2021-09-07 NOTE — ED NOTES
1741- Call out to Pulmonology for consult.    [facility] with callback  Alberto Khan  09/07/21 [address]  09/07/21 1715

## 2021-09-07 NOTE — ED NOTES
Respiratory therapists trying to adjust ETT and extubated pt. Mario Dials at bedside and reintubated pt.       Isabelle Michael RN  09/07/21 9451

## 2021-09-07 NOTE — ED NOTES
Pt to room 1 with EMS. Acutely ill, resp >60 Kussmaul. Ketones smell noted. POC critictal high. Olivia LAZO to bedside. MD to follow. Monitor 100% ra, 2 large bore IVs and labs sent. EKG to bedside.  Sinus. Confused, pulling at IV's and not redirectable.       Shalini Burk  09/07/21 3869

## 2021-09-08 ENCOUNTER — APPOINTMENT (OUTPATIENT)
Dept: GENERAL RADIOLOGY | Age: 36
DRG: 207 | End: 2021-09-08
Payer: COMMERCIAL

## 2021-09-08 PROBLEM — J18.9 HCAP (HEALTHCARE-ASSOCIATED PNEUMONIA): Status: ACTIVE | Noted: 2021-09-08

## 2021-09-08 PROBLEM — J96.01 ACUTE RESPIRATORY FAILURE WITH HYPOXIA (HCC): Status: ACTIVE | Noted: 2021-09-08

## 2021-09-08 PROBLEM — G93.41 ACUTE METABOLIC ENCEPHALOPATHY: Status: ACTIVE | Noted: 2021-09-08

## 2021-09-08 LAB
ANION GAP SERPL CALCULATED.3IONS-SCNC: 12 MMOL/L (ref 3–16)
ANION GAP SERPL CALCULATED.3IONS-SCNC: 15 MMOL/L (ref 3–16)
ANION GAP SERPL CALCULATED.3IONS-SCNC: 15 MMOL/L (ref 3–16)
ANION GAP SERPL CALCULATED.3IONS-SCNC: 16 MMOL/L (ref 3–16)
ANION GAP SERPL CALCULATED.3IONS-SCNC: 18 MMOL/L (ref 3–16)
ANION GAP SERPL CALCULATED.3IONS-SCNC: 24 MMOL/L (ref 3–16)
BANDED NEUTROPHILS RELATIVE PERCENT: 4 % (ref 0–7)
BASE EXCESS ARTERIAL: -13.2 MMOL/L (ref -3–3)
BASOPHILS ABSOLUTE: 0 K/UL (ref 0–0.2)
BASOPHILS RELATIVE PERCENT: 0 %
BUN BLDV-MCNC: 19 MG/DL (ref 7–20)
BUN BLDV-MCNC: 20 MG/DL (ref 7–20)
BUN BLDV-MCNC: 21 MG/DL (ref 7–20)
BUN BLDV-MCNC: 21 MG/DL (ref 7–20)
CALCIUM SERPL-MCNC: 7.9 MG/DL (ref 8.3–10.6)
CALCIUM SERPL-MCNC: 8 MG/DL (ref 8.3–10.6)
CALCIUM SERPL-MCNC: 8.1 MG/DL (ref 8.3–10.6)
CALCIUM SERPL-MCNC: 8.2 MG/DL (ref 8.3–10.6)
CARBOXYHEMOGLOBIN ARTERIAL: 0.3 % (ref 0–1.5)
CHLORIDE BLD-SCNC: 104 MMOL/L (ref 99–110)
CHLORIDE BLD-SCNC: 106 MMOL/L (ref 99–110)
CHLORIDE BLD-SCNC: 107 MMOL/L (ref 99–110)
CHLORIDE BLD-SCNC: 108 MMOL/L (ref 99–110)
CHLORIDE BLD-SCNC: 109 MMOL/L (ref 99–110)
CHLORIDE BLD-SCNC: 109 MMOL/L (ref 99–110)
CO2: 10 MMOL/L (ref 21–32)
CO2: 12 MMOL/L (ref 21–32)
CO2: 14 MMOL/L (ref 21–32)
CO2: 8 MMOL/L (ref 21–32)
CREAT SERPL-MCNC: 1.5 MG/DL (ref 0.9–1.3)
CREAT SERPL-MCNC: 1.8 MG/DL (ref 0.9–1.3)
CREAT SERPL-MCNC: 2 MG/DL (ref 0.9–1.3)
CREAT SERPL-MCNC: 2.2 MG/DL (ref 0.9–1.3)
CREAT SERPL-MCNC: 2.3 MG/DL (ref 0.9–1.3)
CREAT SERPL-MCNC: 2.5 MG/DL (ref 0.9–1.3)
EKG ATRIAL RATE: 136 BPM
EKG ATRIAL RATE: 147 BPM
EKG DIAGNOSIS: NORMAL
EKG DIAGNOSIS: NORMAL
EKG P AXIS: 66 DEGREES
EKG P-R INTERVAL: 154 MS
EKG Q-T INTERVAL: 286 MS
EKG Q-T INTERVAL: 300 MS
EKG QRS DURATION: 118 MS
EKG QRS DURATION: 84 MS
EKG QTC CALCULATION (BAZETT): 430 MS
EKG QTC CALCULATION (BAZETT): 466 MS
EKG R AXIS: 48 DEGREES
EKG R AXIS: 77 DEGREES
EKG T AXIS: -28 DEGREES
EKG T AXIS: 62 DEGREES
EKG VENTRICULAR RATE: 136 BPM
EKG VENTRICULAR RATE: 145 BPM
EOSINOPHILS ABSOLUTE: 0 K/UL (ref 0–0.6)
EOSINOPHILS RELATIVE PERCENT: 0 %
ESTIMATED AVERAGE GLUCOSE: 274.7 MG/DL
GFR AFRICAN AMERICAN: 35
GFR AFRICAN AMERICAN: 39
GFR AFRICAN AMERICAN: 41
GFR AFRICAN AMERICAN: 46
GFR AFRICAN AMERICAN: 51
GFR AFRICAN AMERICAN: >60
GFR NON-AFRICAN AMERICAN: 29
GFR NON-AFRICAN AMERICAN: 32
GFR NON-AFRICAN AMERICAN: 34
GFR NON-AFRICAN AMERICAN: 38
GFR NON-AFRICAN AMERICAN: 42
GFR NON-AFRICAN AMERICAN: 52
GLUCOSE BLD-MCNC: 137 MG/DL (ref 70–99)
GLUCOSE BLD-MCNC: 147 MG/DL (ref 70–99)
GLUCOSE BLD-MCNC: 154 MG/DL (ref 70–99)
GLUCOSE BLD-MCNC: 155 MG/DL (ref 70–99)
GLUCOSE BLD-MCNC: 155 MG/DL (ref 70–99)
GLUCOSE BLD-MCNC: 157 MG/DL (ref 70–99)
GLUCOSE BLD-MCNC: 159 MG/DL (ref 70–99)
GLUCOSE BLD-MCNC: 159 MG/DL (ref 70–99)
GLUCOSE BLD-MCNC: 163 MG/DL (ref 70–99)
GLUCOSE BLD-MCNC: 165 MG/DL (ref 70–99)
GLUCOSE BLD-MCNC: 166 MG/DL (ref 70–99)
GLUCOSE BLD-MCNC: 169 MG/DL (ref 70–99)
GLUCOSE BLD-MCNC: 171 MG/DL (ref 70–99)
GLUCOSE BLD-MCNC: 177 MG/DL (ref 70–99)
GLUCOSE BLD-MCNC: 177 MG/DL (ref 70–99)
GLUCOSE BLD-MCNC: 178 MG/DL (ref 70–99)
GLUCOSE BLD-MCNC: 186 MG/DL (ref 70–99)
GLUCOSE BLD-MCNC: 188 MG/DL (ref 70–99)
GLUCOSE BLD-MCNC: 188 MG/DL (ref 70–99)
GLUCOSE BLD-MCNC: 190 MG/DL (ref 70–99)
GLUCOSE BLD-MCNC: 198 MG/DL (ref 70–99)
GLUCOSE BLD-MCNC: 200 MG/DL (ref 70–99)
GLUCOSE BLD-MCNC: 206 MG/DL (ref 70–99)
GLUCOSE BLD-MCNC: 207 MG/DL (ref 70–99)
GLUCOSE BLD-MCNC: 209 MG/DL (ref 70–99)
GLUCOSE BLD-MCNC: 210 MG/DL (ref 70–99)
GLUCOSE BLD-MCNC: 222 MG/DL (ref 70–99)
GLUCOSE BLD-MCNC: 228 MG/DL (ref 70–99)
GLUCOSE BLD-MCNC: 271 MG/DL (ref 70–99)
GLUCOSE BLD-MCNC: 310 MG/DL (ref 70–99)
GLUCOSE BLD-MCNC: >600 MG/DL (ref 70–99)
HBA1C MFR BLD: 11.2 %
HCO3 ARTERIAL: 13.3 MMOL/L (ref 21–29)
HCT VFR BLD CALC: 41.2 % (ref 40.5–52.5)
HEMOGLOBIN, ART, EXTENDED: 13.9 G/DL (ref 13.5–17.5)
HEMOGLOBIN: 13.4 G/DL (ref 13.5–17.5)
LACTIC ACID: 1 MMOL/L (ref 0.4–2)
LACTIC ACID: 1.7 MMOL/L (ref 0.4–2)
LACTIC ACID: 2.4 MMOL/L (ref 0.4–2)
LACTIC ACID: 3 MMOL/L (ref 0.4–2)
LYMPHOCYTES ABSOLUTE: 2.7 K/UL (ref 1–5.1)
LYMPHOCYTES RELATIVE PERCENT: 15 %
MAGNESIUM: 1.4 MG/DL (ref 1.8–2.4)
MAGNESIUM: 1.7 MG/DL (ref 1.8–2.4)
MAGNESIUM: 1.8 MG/DL (ref 1.8–2.4)
MAGNESIUM: 1.9 MG/DL (ref 1.8–2.4)
MCH RBC QN AUTO: 30.3 PG (ref 26–34)
MCHC RBC AUTO-ENTMCNC: 32.6 G/DL (ref 31–36)
MCV RBC AUTO: 93 FL (ref 80–100)
METHEMOGLOBIN ARTERIAL: 0.1 %
MONOCYTES ABSOLUTE: 0.4 K/UL (ref 0–1.3)
MONOCYTES RELATIVE PERCENT: 2 %
NEUTROPHILS ABSOLUTE: 14.8 K/UL (ref 1.7–7.7)
NEUTROPHILS RELATIVE PERCENT: 79 %
O2 SAT, ARTERIAL: 89.2 %
O2 THERAPY: ABNORMAL
PCO2 ARTERIAL: 33 MMHG (ref 35–45)
PDW BLD-RTO: 14.7 % (ref 12.4–15.4)
PERFORMED ON: ABNORMAL
PH ARTERIAL: 7.22 (ref 7.35–7.45)
PHOSPHORUS: 1 MG/DL (ref 2.5–4.9)
PHOSPHORUS: 1 MG/DL (ref 2.5–4.9)
PHOSPHORUS: 1.1 MG/DL (ref 2.5–4.9)
PHOSPHORUS: 1.7 MG/DL (ref 2.5–4.9)
PHOSPHORUS: 2.6 MG/DL (ref 2.5–4.9)
PHOSPHORUS: 2.8 MG/DL (ref 2.5–4.9)
PLATELET # BLD: 200 K/UL (ref 135–450)
PLATELET SLIDE REVIEW: ADEQUATE
PMV BLD AUTO: 8.4 FL (ref 5–10.5)
PO2 ARTERIAL: 65.5 MMHG (ref 75–108)
POTASSIUM SERPL-SCNC: 3.8 MMOL/L (ref 3.5–5.1)
POTASSIUM SERPL-SCNC: 4.1 MMOL/L (ref 3.5–5.1)
POTASSIUM SERPL-SCNC: 4.2 MMOL/L (ref 3.5–5.1)
POTASSIUM SERPL-SCNC: 5.5 MMOL/L (ref 3.5–5.1)
RBC # BLD: 4.43 M/UL (ref 4.2–5.9)
SLIDE REVIEW: ABNORMAL
SODIUM BLD-SCNC: 133 MMOL/L (ref 136–145)
SODIUM BLD-SCNC: 135 MMOL/L (ref 136–145)
SODIUM BLD-SCNC: 136 MMOL/L (ref 136–145)
SODIUM BLD-SCNC: 137 MMOL/L (ref 136–145)
TCO2 ARTERIAL: 14.3 MMOL/L
WBC # BLD: 17.8 K/UL (ref 4–11)

## 2021-09-08 PROCEDURE — 83605 ASSAY OF LACTIC ACID: CPT

## 2021-09-08 PROCEDURE — 6370000000 HC RX 637 (ALT 250 FOR IP): Performed by: INTERNAL MEDICINE

## 2021-09-08 PROCEDURE — 87070 CULTURE OTHR SPECIMN AEROBIC: CPT

## 2021-09-08 PROCEDURE — 82803 BLOOD GASES ANY COMBINATION: CPT

## 2021-09-08 PROCEDURE — 71045 X-RAY EXAM CHEST 1 VIEW: CPT

## 2021-09-08 PROCEDURE — 84100 ASSAY OF PHOSPHORUS: CPT

## 2021-09-08 PROCEDURE — 94761 N-INVAS EAR/PLS OXIMETRY MLT: CPT

## 2021-09-08 PROCEDURE — 2000000000 HC ICU R&B

## 2021-09-08 PROCEDURE — 87077 CULTURE AEROBIC IDENTIFY: CPT

## 2021-09-08 PROCEDURE — 85025 COMPLETE CBC W/AUTO DIFF WBC: CPT

## 2021-09-08 PROCEDURE — 93010 ELECTROCARDIOGRAM REPORT: CPT | Performed by: INTERNAL MEDICINE

## 2021-09-08 PROCEDURE — 2580000003 HC RX 258: Performed by: INTERNAL MEDICINE

## 2021-09-08 PROCEDURE — 2500000003 HC RX 250 WO HCPCS: Performed by: INTERNAL MEDICINE

## 2021-09-08 PROCEDURE — 89220 SPUTUM SPECIMEN COLLECTION: CPT

## 2021-09-08 PROCEDURE — 6360000002 HC RX W HCPCS: Performed by: INTERNAL MEDICINE

## 2021-09-08 PROCEDURE — 99291 CRITICAL CARE FIRST HOUR: CPT | Performed by: INTERNAL MEDICINE

## 2021-09-08 PROCEDURE — 86403 PARTICLE AGGLUT ANTBDY SCRN: CPT

## 2021-09-08 PROCEDURE — 87205 SMEAR GRAM STAIN: CPT

## 2021-09-08 PROCEDURE — 99233 SBSQ HOSP IP/OBS HIGH 50: CPT | Performed by: INTERNAL MEDICINE

## 2021-09-08 PROCEDURE — 87186 SC STD MICRODIL/AGAR DIL: CPT

## 2021-09-08 PROCEDURE — 80048 BASIC METABOLIC PNL TOTAL CA: CPT

## 2021-09-08 PROCEDURE — 36415 COLL VENOUS BLD VENIPUNCTURE: CPT

## 2021-09-08 PROCEDURE — 2700000000 HC OXYGEN THERAPY PER DAY

## 2021-09-08 PROCEDURE — 94003 VENT MGMT INPAT SUBQ DAY: CPT

## 2021-09-08 PROCEDURE — 83735 ASSAY OF MAGNESIUM: CPT

## 2021-09-08 RX ORDER — DICLOFENAC SODIUM 75 MG/1
75 TABLET, DELAYED RELEASE ORAL 2 TIMES DAILY
Status: ON HOLD | COMMUNITY
End: 2021-09-14 | Stop reason: HOSPADM

## 2021-09-08 RX ORDER — INSULIN ASPART 100 [IU]/ML
INJECTION, SOLUTION INTRAVENOUS; SUBCUTANEOUS
COMMUNITY
Start: 2021-05-21 | End: 2021-09-14 | Stop reason: SDUPTHER

## 2021-09-08 RX ORDER — ROSUVASTATIN CALCIUM 10 MG/1
10 TABLET, COATED ORAL DAILY
COMMUNITY

## 2021-09-08 RX ORDER — TOPIRAMATE 50 MG/1
TABLET, FILM COATED ORAL
COMMUNITY
Start: 2021-08-14

## 2021-09-08 RX ORDER — ACETAMINOPHEN 325 MG/1
650 TABLET ORAL EVERY 4 HOURS PRN
Status: DISCONTINUED | OUTPATIENT
Start: 2021-09-08 | End: 2021-09-14 | Stop reason: HOSPADM

## 2021-09-08 RX ORDER — CARIPRAZINE 6 MG/1
CAPSULE, GELATIN COATED ORAL
COMMUNITY
Start: 2021-08-24

## 2021-09-08 RX ORDER — BUPROPION HYDROCHLORIDE 300 MG/1
300 TABLET ORAL EVERY MORNING
COMMUNITY

## 2021-09-08 RX ADMIN — POTASSIUM CHLORIDE 10 MEQ: 7.46 INJECTION, SOLUTION INTRAVENOUS at 06:40

## 2021-09-08 RX ADMIN — POTASSIUM CHLORIDE 10 MEQ: 7.46 INJECTION, SOLUTION INTRAVENOUS at 10:29

## 2021-09-08 RX ADMIN — WHITE PETROLATUM 57.7 %-MINERAL OIL 31.9 % EYE OINTMENT: at 01:47

## 2021-09-08 RX ADMIN — Medication 15 ML: at 19:55

## 2021-09-08 RX ADMIN — PROPOFOL 50 MCG/KG/MIN: 10 INJECTION, EMULSION INTRAVENOUS at 03:41

## 2021-09-08 RX ADMIN — VANCOMYCIN HYDROCHLORIDE 2000 MG: 10 INJECTION, POWDER, LYOPHILIZED, FOR SOLUTION INTRAVENOUS at 02:14

## 2021-09-08 RX ADMIN — PROPOFOL 50 MCG/KG/MIN: 10 INJECTION, EMULSION INTRAVENOUS at 07:04

## 2021-09-08 RX ADMIN — Medication 4 MG/HR: at 18:11

## 2021-09-08 RX ADMIN — DEXTROSE AND SODIUM CHLORIDE: 5; 450 INJECTION, SOLUTION INTRAVENOUS at 20:05

## 2021-09-08 RX ADMIN — SODIUM CHLORIDE 4.7 UNITS/HR: 9 INJECTION, SOLUTION INTRAVENOUS at 09:33

## 2021-09-08 RX ADMIN — MIDAZOLAM HYDROCHLORIDE 2 MG: 1 INJECTION, SOLUTION INTRAMUSCULAR; INTRAVENOUS at 19:48

## 2021-09-08 RX ADMIN — PROPOFOL 50 MCG/KG/MIN: 10 INJECTION, EMULSION INTRAVENOUS at 23:36

## 2021-09-08 RX ADMIN — DEXTROSE AND SODIUM CHLORIDE: 5; 450 INJECTION, SOLUTION INTRAVENOUS at 01:19

## 2021-09-08 RX ADMIN — FAMOTIDINE 20 MG: 10 INJECTION, SOLUTION INTRAVENOUS at 11:03

## 2021-09-08 RX ADMIN — ACETAMINOPHEN 650 MG: 325 TABLET ORAL at 17:29

## 2021-09-08 RX ADMIN — ENOXAPARIN SODIUM 40 MG: 40 INJECTION SUBCUTANEOUS at 09:30

## 2021-09-08 RX ADMIN — POTASSIUM CHLORIDE 10 MEQ: 7.46 INJECTION, SOLUTION INTRAVENOUS at 19:52

## 2021-09-08 RX ADMIN — WHITE PETROLATUM 57.7 %-MINERAL OIL 31.9 % EYE OINTMENT: at 05:10

## 2021-09-08 RX ADMIN — ACETAMINOPHEN 650 MG: 325 TABLET ORAL at 01:47

## 2021-09-08 RX ADMIN — CARBOXYMETHYLCELLULOSE SODIUM 1 DROP: 10 GEL OPHTHALMIC at 17:28

## 2021-09-08 RX ADMIN — CEFEPIME HYDROCHLORIDE 1000 MG: 1 INJECTION, POWDER, FOR SOLUTION INTRAMUSCULAR; INTRAVENOUS at 17:44

## 2021-09-08 RX ADMIN — POTASSIUM CHLORIDE 10 MEQ: 7.46 INJECTION, SOLUTION INTRAVENOUS at 21:50

## 2021-09-08 RX ADMIN — SODIUM PHOSPHATE, MONOBASIC, MONOHYDRATE 15 MMOL: 276; 142 INJECTION, SOLUTION INTRAVENOUS at 19:54

## 2021-09-08 RX ADMIN — FENTANYL CITRATE 25 MCG: 0.05 INJECTION, SOLUTION INTRAMUSCULAR; INTRAVENOUS at 20:16

## 2021-09-08 RX ADMIN — MUPIROCIN: 20 OINTMENT TOPICAL at 09:30

## 2021-09-08 RX ADMIN — POTASSIUM CHLORIDE 10 MEQ: 7.46 INJECTION, SOLUTION INTRAVENOUS at 17:05

## 2021-09-08 RX ADMIN — CEFEPIME HYDROCHLORIDE 1000 MG: 1 INJECTION, POWDER, FOR SOLUTION INTRAMUSCULAR; INTRAVENOUS at 01:40

## 2021-09-08 RX ADMIN — CARBOXYMETHYLCELLULOSE SODIUM 1 DROP: 10 GEL OPHTHALMIC at 11:55

## 2021-09-08 RX ADMIN — WHITE PETROLATUM 57.7 %-MINERAL OIL 31.9 % EYE OINTMENT: at 15:04

## 2021-09-08 RX ADMIN — MIDAZOLAM HYDROCHLORIDE 2 MG: 1 INJECTION, SOLUTION INTRAMUSCULAR; INTRAVENOUS at 15:58

## 2021-09-08 RX ADMIN — PROPOFOL 50 MCG/KG/MIN: 10 INJECTION, EMULSION INTRAVENOUS at 09:59

## 2021-09-08 RX ADMIN — POTASSIUM CHLORIDE 10 MEQ: 7.46 INJECTION, SOLUTION INTRAVENOUS at 09:20

## 2021-09-08 RX ADMIN — CEFEPIME HYDROCHLORIDE 1000 MG: 1 INJECTION, POWDER, FOR SOLUTION INTRAMUSCULAR; INTRAVENOUS at 09:58

## 2021-09-08 RX ADMIN — CARBOXYMETHYLCELLULOSE SODIUM 1 DROP: 10 GEL OPHTHALMIC at 09:01

## 2021-09-08 RX ADMIN — POTASSIUM CHLORIDE 10 MEQ: 7.46 INJECTION, SOLUTION INTRAVENOUS at 20:54

## 2021-09-08 RX ADMIN — CARBOXYMETHYLCELLULOSE SODIUM 1 DROP: 10 GEL OPHTHALMIC at 19:56

## 2021-09-08 RX ADMIN — DEXTROSE AND SODIUM CHLORIDE: 5; 450 INJECTION, SOLUTION INTRAVENOUS at 14:07

## 2021-09-08 RX ADMIN — PROPOFOL 50 MCG/KG/MIN: 10 INJECTION, EMULSION INTRAVENOUS at 17:03

## 2021-09-08 RX ADMIN — CARBOXYMETHYLCELLULOSE SODIUM 1 DROP: 10 GEL OPHTHALMIC at 23:37

## 2021-09-08 RX ADMIN — WHITE PETROLATUM 57.7 %-MINERAL OIL 31.9 % EYE OINTMENT: at 19:56

## 2021-09-08 RX ADMIN — DEXTROSE AND SODIUM CHLORIDE: 5; 450 INJECTION, SOLUTION INTRAVENOUS at 07:27

## 2021-09-08 RX ADMIN — WHITE PETROLATUM 57.7 %-MINERAL OIL 31.9 % EYE OINTMENT: at 17:29

## 2021-09-08 RX ADMIN — CARBOXYMETHYLCELLULOSE SODIUM 1 DROP: 10 GEL OPHTHALMIC at 03:42

## 2021-09-08 RX ADMIN — MAGNESIUM SULFATE HEPTAHYDRATE 1000 MG: 1 INJECTION, SOLUTION INTRAVENOUS at 05:27

## 2021-09-08 RX ADMIN — POTASSIUM CHLORIDE 10 MEQ: 7.46 INJECTION, SOLUTION INTRAVENOUS at 16:03

## 2021-09-08 RX ADMIN — CARBOXYMETHYLCELLULOSE SODIUM 1 DROP: 10 GEL OPHTHALMIC at 00:11

## 2021-09-08 RX ADMIN — MUPIROCIN: 20 OINTMENT TOPICAL at 20:55

## 2021-09-08 RX ADMIN — POTASSIUM CHLORIDE 10 MEQ: 7.46 INJECTION, SOLUTION INTRAVENOUS at 23:54

## 2021-09-08 RX ADMIN — PROPOFOL 50 MCG/KG/MIN: 10 INJECTION, EMULSION INTRAVENOUS at 13:55

## 2021-09-08 RX ADMIN — DEXTROSE MONOHYDRATE 500 MG: 50 INJECTION, SOLUTION INTRAVENOUS at 11:13

## 2021-09-08 RX ADMIN — MIDAZOLAM HYDROCHLORIDE 2 MG: 1 INJECTION, SOLUTION INTRAMUSCULAR; INTRAVENOUS at 05:05

## 2021-09-08 RX ADMIN — POTASSIUM CHLORIDE 10 MEQ: 7.46 INJECTION, SOLUTION INTRAVENOUS at 05:23

## 2021-09-08 RX ADMIN — FAMOTIDINE 20 MG: 10 INJECTION, SOLUTION INTRAVENOUS at 19:55

## 2021-09-08 RX ADMIN — Medication 15 ML: at 11:03

## 2021-09-08 RX ADMIN — WHITE PETROLATUM 57.7 %-MINERAL OIL 31.9 % EYE OINTMENT: at 09:32

## 2021-09-08 RX ADMIN — PROPOFOL 50 MCG/KG/MIN: 10 INJECTION, EMULSION INTRAVENOUS at 20:52

## 2021-09-08 RX ADMIN — SODIUM PHOSPHATE, MONOBASIC, MONOHYDRATE 30 MMOL: 276; 142 INJECTION, SOLUTION INTRAVENOUS at 12:35

## 2021-09-08 RX ADMIN — MAGNESIUM SULFATE HEPTAHYDRATE 1000 MG: 1 INJECTION, SOLUTION INTRAVENOUS at 06:42

## 2021-09-08 RX ADMIN — POTASSIUM CHLORIDE 10 MEQ: 7.46 INJECTION, SOLUTION INTRAVENOUS at 18:06

## 2021-09-08 RX ADMIN — SODIUM PHOSPHATE, MONOBASIC, MONOHYDRATE 10 MMOL: 276; 142 INJECTION, SOLUTION INTRAVENOUS at 05:41

## 2021-09-08 RX ADMIN — POTASSIUM CHLORIDE 10 MEQ: 7.46 INJECTION, SOLUTION INTRAVENOUS at 07:42

## 2021-09-08 RX ADMIN — PROPOFOL 50 MCG/KG/MIN: 10 INJECTION, EMULSION INTRAVENOUS at 00:18

## 2021-09-08 RX ADMIN — POTASSIUM CHLORIDE 10 MEQ: 7.46 INJECTION, SOLUTION INTRAVENOUS at 11:30

## 2021-09-08 ASSESSMENT — PULMONARY FUNCTION TESTS
PIF_VALUE: 23
PIF_VALUE: 14
PIF_VALUE: 17
PIF_VALUE: 13
PIF_VALUE: 31
PIF_VALUE: 16
PIF_VALUE: 18
PIF_VALUE: 16
PIF_VALUE: 19
PIF_VALUE: 28
PIF_VALUE: 17
PIF_VALUE: 21
PIF_VALUE: 22
PIF_VALUE: 22
PIF_VALUE: 16
PIF_VALUE: 19
PIF_VALUE: 26
PIF_VALUE: 20
PIF_VALUE: 13

## 2021-09-08 ASSESSMENT — PAIN SCALES - GENERAL
PAINLEVEL_OUTOF10: 0
PAINLEVEL_OUTOF10: 0

## 2021-09-08 NOTE — PROGRESS NOTES
End of shift report given to Juan Hernandez RN at bedside. Patient alert and oriented. Bed in lowest position with wheels locked. Call light within reach. No further needs at this time.

## 2021-09-08 NOTE — PROGRESS NOTES
Patient's blood sugar down to 227, moved to multiplier protocol, IVF changed to D5 0.45 NS @ 150ml/hr. Patient calm at this time, will continue to monitor.

## 2021-09-08 NOTE — PROGRESS NOTES
09/08/21 0002   Vent Information   Vt Ordered 450 mL   Rate Set 30 bmp   Peak Flow 60 L/min   Pressure Support 0 cmH20   FiO2  30 %   SpO2 99 %   SpO2/FiO2 ratio 330   Sensitivity 3   PEEP/CPAP 5   I Time/ I Time % 0 s   Vent Patient Data   High Peep/I Pressure 0   Peak Inspiratory Pressure 14 cmH2O   Mean Airway Pressure 1.4 cmH20   Rate Measured 35 br/min   Vt Exhaled 391 mL   Minute Volume 14.7 Liters   I:E Ratio 1:1.10   Spontaneous Breathing Trial (SBT) RT Doc   Pulse 156   Additional Respiratory  Assessments   Resp (!) 35   Alarm Settings   High Pressure Alarm 40 cmH2O   Low Minute Volume Alarm 2.5 L/min   High Respiratory Rate 45 br/min

## 2021-09-08 NOTE — CONSULTS
Pharmacy Note  Vancomycin Consult    Faith Coles is a 38 y.o. male originally admitted for DKA, started on Vancomycin for empiric gram positive coverage of sepsis of unknown etiology; consult received from Dr. Sandra Ramsey to manage therapy. Also receiving the following antibiotics: cefepime. Patient Active Problem List   Diagnosis    Head ache    RA (rheumatoid arthritis) ([facility])    DM (diabetes mellitus) ([facility]    Lateral meniscus tear    Medial meniscus tear    Enthesopathy of hip region    Drug overdose    HTN (hypertension)    Hyperlipidemia    Schizoaffective disorder ([facility])    Altered mental status    Hyperglycemia    Diabetic keto-acidosis (HCC)    Vomiting    Abdominal pain    Fibromyalgia    Mechanical failure of prosthetic joint ([facility])    Left hip revision    DKA, type 2, not at goal [facility])    NOVA (acute kidney injury) ([facility]    Metabolic acidosis    Disorder of electrolytes    Chest pain    DKA, type 1, not at goal [facility])     Allergies:  Aspirin     Temp max: 101.7    Recent Labs     09/07/21  1607 09/07/21  1723 09/07/21 2004 09/08/21  0011   BUN  --    < > 21* 21*   CREATININE  --    < > 1.5* 1.5*   WBC 31.6*  --   --   --     < > = values in this interval not displayed. Intake/Output Summary (Last 24 hours) at 9/8/2021 0223  Last data filed at 9/8/2021 0021  Gross per 24 hour   Intake 2887.78 ml   Output 2175 ml   Net 712.78 ml     Culture Date      Source                       Results      Ht Readings from Last 1 Encounters:   06/16/21 5' 9\" (1.753 m)        Wt Readings from Last 1 Encounters:   09/07/21 215 lb (97.5 kg)       Body mass index is 31.75 kg/m². CrCl cannot be calculated (Unknown ideal weight.). Goal AUC Level: 400 - 600 mcg/mL    Assessment/Plan:  Will initiate Vancomycin with a one time loading dose of 2000 mg x1, followed by 1250 mg IV every 18 hours. A vancomycin level has been ordered for 9/9 @ 1300. Projected value = 11.6 per Insight plotting.     Insight summary:  Loading dose: 2000 mg x 1 9/8 @ 0200  Regimen: 1250 mg IV every 18 hours. Start time: 08:30 on 09/10/2021  Exposure target: AUC24 (range)400-600 mg/L.hr   AUC24,ss: 453 mg/L.hr  Probability of AUC24 > 400: 63 %  Ctrough,ss: 13.8 mg/L         Thank you for the consult. Will continue to follow.

## 2021-09-08 NOTE — PROGRESS NOTES
Patient waking up on ventilator, restless, increased propofol drip, patient intubated ETT size 8 at 22 lip line, OG tube at 53cm, attached to LCWS, green bile noted, lung sounds CTA, deep respirations noted kussmaul type,  no secretions at this time, BS active, no edema, bruising noted on arms. Bilateral wrist restraints in place on arrival to unit for airway protection. Unable to answer history questions at this time, family has been called but no answer.

## 2021-09-08 NOTE — PROGRESS NOTES
Vanc  Srcr increased to 2.0, will change Vanc to 1250mg q24h and check level on 9/10 at [facility] D 9/8/202112:57 PM  .

## 2021-09-08 NOTE — PROGRESS NOTES
09/07/21 2024   Vent Information   $Ventilation $Initial Day   Skin Assessment Clean, dry, & intact   Vent Type 840   Vent Mode AC/VC   Vt Ordered 450 mL   Rate Set 30 bmp   Peak Flow 60 L/min   Pressure Support 0 cmH20   FiO2  40 %   SpO2 100 %   SpO2/FiO2 ratio 250   Sensitivity 3   PEEP/CPAP 5   I Time/ I Time % 0 s   Humidification Source HME   Vent Patient Data   High Peep/I Pressure 0   Peak Inspiratory Pressure 18 cmH2O   Mean Airway Pressure 1.9 cmH20   Rate Measured 32 br/min   Vt Exhaled 508 mL   Minute Volume 13.6 Liters   I:E Ratio 1:1.40   Plateau Pressure 26 BYD53   Static Compliance 24 mL/cmH2O   Dynamic Compliance 39 mL/cmH2O   Cough/Sputum   Sputum How Obtained Endotracheal;Suctioned   Cough Non-productive   Sputum Amount None   Sputum Color None   Tenacity None   Spontaneous Breathing Trial (SBT) RT Doc   Pulse 145   Breath Sounds   Right Upper Lobe Diminished   Right Middle Lobe Diminished   Right Lower Lobe Diminished   Left Upper Lobe Diminished   Left Lower Lobe Diminished   Additional Respiratory  Assessments   Resp 28   Position Semi-Vila's   Alarm Settings   High Pressure Alarm 40 cmH2O   Low Minute Volume Alarm 2.5 L/min   Apnea (secs) 20 secs   High Respiratory Rate 45 br/min   Low Exhaled Vt  250 mL   Patient Observation   Observations ETT SIZE 8.0, SECURED AT 23 LIP LINE   AMBU BAG AT HEAD OF BED.      Non-Surgical Airway 09/07/21 Endo Tracheal Tube   Placement Date/Time: 09/07/21 1659   Placed By: In ED  Inserted by: Dr Noni Dempsey  Airway Device: Endo Tracheal Tube  Size: 8   Secured at 23 cm   Measured From Lips   Secured Location Left   Secured By Commercial tube anne   Site Condition Dry

## 2021-09-08 NOTE — PROGRESS NOTES
Temperature and HR improving, blood sugars stabilizing, chlorhexidine bath complete, am labs drawn. Will continue to monitor.

## 2021-09-08 NOTE — PROGRESS NOTES
Sent secure message to Dr. Anirudh Underwood regarding gap closed x2 to see if continue with DKA protocol.   Pt has no order for tube feed

## 2021-09-08 NOTE — CARE COORDINATION
Pt in ICU on Ventilator. Writer left vm for pt's spouse for initial interview. Awaiting call back. Following.

## 2021-09-08 NOTE — PROGRESS NOTES
Comprehensive Nutrition Assessment    Type and Reason for Visit:  Initial, Consult (consult for TF ordering and management)    Nutrition Recommendations/Plan:   1. Continue NPO status until patient is medically cleared to receive nutrition therapy while intubated. 2. TF recommendations, when appropriate - ADULT TUBE FEEDING; Orogastric; Peptide-Based High-Protein formula - Vital High-Protein with a goal rate of 55 ml/hr x 20 hours. Start with 25 ml/hr and increase by 15 ml every 4 hours, as tolerated by patient, until goal rate can be achieved and maintained. Water flushes, 30 ml every 4 hours or per MD guidance. 3. Monitor vent status, sedation type/amount (propofol at 50 mcg x 24 hours which = 642 kcals from lipids), and plan of care + TG checks. 4. Monitor nutrition-related labs (hgb A1c was 11.2% on 9/7/21), bowel function, and weight trends. Nutrition Assessment:  patient is nutritionally compromised AEB SOB/respiratory dysfunction PTA and very elevated blood glucose level upon admission and he is at risk for further compromise d/t need for intubation, hgb A1c was 11.2% on 9/7/21 and patient is a type I diabetic, and NPO status; will continue NPO status and provide EN recommendations    Malnutrition Assessment:  Malnutrition Status: At risk for malnutrition     Context:  Chronic Illness     Findings of the 6 clinical characteristics of malnutrition:  Energy Intake:  Unable to assess  Weight Loss:  7 - Greater than 10% over 6 months (- 31# or 14.8% weight loss x 5-6 months (since 3/31/21))     Body Fat Loss:  No significant body fat loss     Muscle Mass Loss:  Unable to assess    Fluid Accumulation:  No significant fluid accumulation     Strength:  Not Performed    Estimated Daily Nutrient Needs:  Energy (kcal):  1620 - 1863 kcals based on 20-23 kcals/kg/CBW; Weight Used for Energy Requirements:  Current     Protein (g):  88 - 95 g protein based on 1.2-1.3 g/kg/IBW;  Weight Used for Protein Requirements:  Ideal        Fluid (ml/day):  1620 - 1863 ml; Method Used for Fluid Requirements:  1 ml/kcal      Nutrition Related Findings:  patient is intubated and sedated with 50 mcg propofol at this time; patient responds to pain; hx of type I diabetes - hgb A1c on 9/7/21 was 11.2%; abdomen is soft and bowel sounds are active; OG to LWCS; Na, Phos, hgb, and Ca are low; Cr is elevated and GFR = 38; patient ahs an insulin drip, versed in D5, lovenox, and pepcid ordered at this time      Wounds:  None       Current Nutrition Therapies:    Diet NPO    Anthropometric Measures:  · Height: 5' 9\" (175.3 cm) (per past encounters)  · Current Body Weight: 179 lb 3.7 oz (81.3 kg) (obtained on 9/8/21)   · Admission Body Weight: 179 lb 3.7 oz (81.3 kg) (obtained on 9/8/21; actual weight)    · Usual Body Weight: 210 lb 6.4 oz (95.4 kg) (obtained on 3/31/21; actual weight)     · Ideal Body Weight: 160 lbs; % Ideal Body Weight 112 %   · BMI: 26.5   · BMI Categories: Overweight (BMI 25.0-29. 9)       Nutrition Diagnosis:   · Inadequate oral intake related to impaired respiratory function, inadequate protein-energy intake, endocrine dysfuntion as evidenced by NPO or clear liquid status due to medical condition, intubation, lab values, weight loss greater than or equal to 10% in 6 months      Nutrition Interventions:   Food and/or Nutrient Delivery:  Continue NPO, Start Tube Feeding  Nutrition Education/Counseling:  No recommendation at this time   Coordination of Nutrition Care:  Continue to monitor while inpatient, Interdisciplinary Rounds    Goals:  patient will tolerate Vital High-Protein at goal rate of 55 ml/hr x 20 hours without GI distress, without s/s of aspiration, and without additional lab/fluid disturbances       Nutrition Monitoring and Evaluation:   Behavioral-Environmental Outcomes:  None Identified   Food/Nutrient Intake Outcomes:  Enteral Nutrition Intake/Tolerance  Physical Signs/Symptoms Outcomes:  Biochemical Data, Hemodynamic Status, Skin, Weight, Nutrition Focused Physical Findings     Discharge Planning:     Too soon to determine     Electronically signed by Krystal Leroy RD, DAISY on 9/8/21 at 11:34 AM EDT    Contact: [phone] [Polyuria] : no polyuria [Headaches] : no headaches [Constipation] : no constipation [Fatigue] : no fatigue [Polydipsia] : no polydipsia [Vomiting] : no vomiting [FreeTextEntry2] : CHRISTINE is a 15 year old female who presents referred by pediatrician for amenorrhea. I initially did English with Christine and Mandarin with [facility], but Christine states Mandarin is better for her. She was first noted breast development about 10 years and also reported menarche then. Reportedly has had regular menses until January 2020. She states that she was on a diet end of 2018 until about December of last year, 2019. She dropped her weight by 20 lbs. She noticed that her menses have been less and less, but were monthly. Then, from start of this year, she stopped her diet and went back to eating regularly. The last menses she had was last year December 2019 or January 2020 she believes. She is not sure. \par She went to see. Dr. Laurie Goode of OB/Gyn who sent blood work, and recommended Provera course last month. She was told that if menses did come to call her, and not to come to the appointment with me. But as it did not they are here to see me. Unexpectedly she came with referral where pediatrician Laurie Goode note that FSH, LH and estradiol all all low. Only estradiol I would agree is low. \par The lightest weight was 48 kg or so, she states while she was not on a diet the weight stayed here. It was about March / April due to shelter in place she started to gain weight. \par She does not have specifically have a weight that she is going for. She states that what she wants is to remove fat. She feels she could use removing more fat still. \par \par 8/30/2020\par FSH 5.2 mIU/mL\par Prolactin 15.8 ng/mL\par TSH 3.96 uIU/mL\par LH 3.4 mIU/mL\par testosterone total 6.9 ng/dL\par DHEAS 72 ug/dL\par Estradiol 22.76 pg/mL\par 17 OH progesterone 54 ng/dL\par \par 8/7/2018\par Lipid profile with HDL 45 m/dL,  mg/dL,  mg/dL, total cholesterol 187 mg/dL\par CBC normal\par CMP normal\par \par   [Abdominal Pain] : no abdominal pain [FreeTextEntry1] : menarche at 10 years of age, was monthly until Dec 2019/ Jan 2020 then nothing since

## 2021-09-08 NOTE — PROGRESS NOTES
Unable to reach patient's wife, was able to reach his mother, states she will go to patient's home and ask wife to call hospital for needed admission information and medication reconciliation.

## 2021-09-08 NOTE — PROGRESS NOTES
09/07/21 2024   Vent Information   $Ventilation $Initial Day   Skin Assessment Clean, dry, & intact   Vent Type 840   Vent Mode AC/VC   Vt Ordered 450 mL   Rate Set 30 bmp   Peak Flow 60 L/min   Pressure Support 0 cmH20   FiO2  40 %   SpO2 100 %   SpO2/FiO2 ratio 250   Sensitivity 3   PEEP/CPAP 5   I Time/ I Time % 0 s   Humidification Source HME   Vent Patient Data   High Peep/I Pressure 0   Peak Inspiratory Pressure 18 cmH2O   Mean Airway Pressure 1.9 cmH20   Rate Measured 32 br/min   Vt Exhaled 508 mL   Minute Volume 13.6 Liters   I:E Ratio 1:1.40   Spontaneous Breathing Trial (SBT) RT Doc   Pulse 145   Additional Respiratory  Assessments   Resp 28   Alarm Settings   High Pressure Alarm 40 cmH2O   Low Minute Volume Alarm 2.5 L/min   High Respiratory Rate 0 br/min

## 2021-09-08 NOTE — PROGRESS NOTES
Shift assessment completed, see flow sheet. RASS -2. Intubated and sedated on AC # 8 ETT, at 23 LL. 30 / 450 / 30 %/ +5. SpO2 100%. Respirations are easy, even, and unlabored. Bilateral lung sounds diminished. VSS  Temp 101.4  Sinus Tach on the monitor  OG in place at 53 to continuous low wall suction. Per CXR advanced OG tube to 65    PIV, WNL with propofol infusing at 50 mcg/kg/min  Insulin 4.8 units/hr  D5 0.45% normal saline 150ml/hr  Versed 4 mg/hr    All lines and monitoring devices in place. Marie is patent and secured. Bilateral soft wrist restraints in place for patient safety. Bed in lowest position with wheels locked. No needs expressed at this time. Will continue to monitor.

## 2021-09-08 NOTE — PROGRESS NOTES
Patient coughing up thick bloody sputum,  Unable to bring  Up secretions with ETT suctioning but patient able to cough them out of tube, air leaking around cuff, patient also noted to have blood in OG aspirate. RT called to check cuff and patient. Will continue to monitor.

## 2021-09-08 NOTE — ED PROVIDER NOTES
Emergency Department Provider Note     Location: [facility] ICU  9/7/2021    I independently performed a history and physical on Dar Gannonr. All diagnostic, treatment, and disposition decisions were made by myself in conjunction with the mid-level provider. Briefly, this is a 38 y.o. male here for AMS. Patient has type 1 DM and found altered. No further history available due to AMS. EMS gave 1L fluid bolus. FSBS read \"high\" on EMS machine. ED Triage Vitals   BP Temp Temp src Pulse Resp SpO2 Height Weight   09/07/21 1603 -- -- 09/07/21 1548 09/07/21 1603 09/07/21 1612 -- 09/07/21 1626   (!) 182/106   137 (!) 39 100 %  215 lb (97.5 kg)        Exam showed WDWN male altered, opens his eyes to voice but mumbles his answers or does not answer to questions, does not follow command, heart tachycardic, lungs clear,abd soft, nondistended, no LE edema. Patient seen and examined in room 1. EKG#1  The Ekg interpreted by me in the absence of a cardiologist shows. sinus tachycardia, mpwb=617    Axis is   Normal  QTc is  normal  Intervals and Durations are unremarkable. Nonspecific ST-T wave changes noted - may be related to patient's metabolic derangement. Will correlate with troponin. No evidence of acute ischemia. Compared to prior EKG dated November 18, 2014, patient is tachycardic today. EKG#2 - patient flipped into V. tach pattern on cardiac monitor in his room while waiting on CMP and there were issues with his vent machine. VBG already resulted and showed pH of 6.8. He had strong bounding pulses. We pushed 1 amp bicarb and also hyperventilated him. Repeat EKG ordered due to rhythm changes on the cardiac monitor    The Ekg interpreted by me in the absence of a cardiologist shows. supraventricular tachycardia with a rate of 145  Axis is   Normal  Nonspecific ST-T wave changes appreciated. No evidence of acute ischemia.    Prior to prior EKG dated earlier today, patient is in SVT    Radiology  XR CHEST PORTABLE    Result Date: 9/7/2021  EXAMINATION: ONE XRAY VIEW OF THE CHEST 9/7/2021 4:13 pm COMPARISON: 06/16/2021 and prior HISTORY: ORDERING SYSTEM PROVIDED HISTORY: hyperglycemia TECHNOLOGIST PROVIDED HISTORY: Reason for exam:->hyperglycemia Reason for Exam: Hyperglycemia, rapid breathing Acuity: Acute Type of Exam: Initial FINDINGS: Study is limited by overlying support and monitoring apparatus Lung volumes are low. Heart mediastinal contours are within normal limits for technique. Lungs are grossly clear. Osseous structures are unremarkable. Negative low lung volume portable chest       Lab reviewed.     Results for orders placed or performed during the hospital encounter of 09/07/21   COVID-19 & Influenza Combo    Specimen: Nasopharyngeal Swab   Result Value Ref Range    SARS-CoV-2 RNA, RT PCR NOT DETECTED NOT DETECTED    INFLUENZA A NOT DETECTED NOT DETECTED    INFLUENZA B NOT DETECTED NOT DETECTED   CBC auto differential   Result Value Ref Range    WBC 31.6 (H) 4.0 - 11.0 K/uL    RBC 4.70 4.20 - 5.90 M/uL    Hemoglobin 14.0 13.5 - 17.5 g/dL    Hematocrit 48.6 40.5 - 52.5 %    .5 (H) 80.0 - 100.0 fL    MCH 29.8 26.0 - 34.0 pg    MCHC 28.8 (L) 31.0 - 36.0 g/dL    RDW 16.1 (H) 12.4 - 15.4 %    Platelets 731 040 - 685 K/uL    MPV 9.4 5.0 - 10.5 fL    PLATELET SLIDE REVIEW Adequate     SLIDE REVIEW see below     Path Consult Yes     Neutrophils % 74.0 %    Lymphocytes % 8.0 %    Monocytes % 12.0 %    Eosinophils % 1.0 %    Basophils % 0.0 %    Neutrophils Absolute 25.0 (H) 1.7 - 7.7 K/uL    Lymphocytes Absolute 2.5 1.0 - 5.1 K/uL    Monocytes Absolute 3.8 (H) 0.0 - 1.3 K/uL    Eosinophils Absolute 0.3 0.0 - 0.6 K/uL    Basophils Absolute 0.0 0.0 - 0.2 K/uL    Bands Relative 5 0 - 7 %    Toxic Granulation Present (A)     Anisocytosis Occasional (A)     Macrocytes 1+ (A)     Polychromasia Occasional (A)    Comprehensive Metabolic Panel w/ Reflex to MG   Result Value Ref Range Sodium 127 (L) 136 - 145 mmol/L    Potassium reflex Magnesium 6.5 (HH) 3.5 - 5.1 mmol/L    Chloride 86 (L) 99 - 110 mmol/L    CO2 3 (LL) 21 - 32 mmol/L    Anion Gap 38 (H) 3 - 16    Glucose 782 (HH) 70 - 99 mg/dL    BUN 19 7 - 20 mg/dL    CREATININE 1.4 (H) 0.9 - 1.3 mg/dL    GFR Non- 57 (A) >60    GFR African American >60 >60    Calcium 9.4 8.3 - 10.6 mg/dL    Total Protein 8.2 6.4 - 8.2 g/dL    Albumin 5.1 (H) 3.4 - 5.0 g/dL    Albumin/Globulin Ratio 1.6 1.1 - 2.2    Total Bilirubin <0.2 0.0 - 1.0 mg/dL    Alkaline Phosphatase 208 (H) 40 - 129 U/L    ALT 13 10 - 40 U/L    AST 16 15 - 37 U/L    Globulin 3.1 g/dL   Troponin   Result Value Ref Range    Troponin <0.01 <0.01 ng/mL   Blood gas, venous   Result Value Ref Range    pH, [facility] 6.808 (LL) 7.350 - 7.450    pCO2, [facility] 15.9 (L) 40.0 - 50.0 mmHg    pO2, [facility] 85.9 (H) 25 - 40 mmHg    HCO3, Venous 2.5 (L) 23.0 - 29.0 mmol/L    Base Excess, [facility] -31.4 (L) -3.0 - 3.0 mmol/L    O2 Sat, [facility] 85 Not Established %    Carboxyhemoglobin 3.5 (H) 0.0 - 1.5 %    MetHgb, [facility] 0.2 <1.5 %    TC02 (Calc), [facility] 3 Not Established mmol/L    O2 Content, [facility] 20 Not Established VOL %    O2 Therapy Unknown    Beta-Hydroxybutyrate   Result Value Ref Range    Beta-Hydroxybutyrate >8.00 (H) 0.00 - 0.27 mmol/L   Lactate, Sepsis   Result Value Ref Range    Lactic Acid, Sepsis 2.5 (H) 0.4 - 1.9 mmol/L   Lactate, Sepsis   Result Value Ref Range    Lactic Acid, Sepsis 1.5 0.4 - 1.9 mmol/L   Basic Metabolic Panel   Result Value Ref Range    Sodium 136 136 - 145 mmol/L    Potassium 4.7 3.5 - 5.1 mmol/L    Chloride 100 99 - 110 mmol/L    CO2 6 (LL) 21 - 32 mmol/L    Anion Gap 30 (H) 3 - 16    Glucose 506 (H) 70 - 99 mg/dL    BUN 21 (H) 7 - 20 mg/dL    CREATININE 1.5 (H) 0.9 - 1.3 mg/dL    GFR Non-African American 52 (A) >60    GFR African American >60 >60    Calcium 8.8 8.3 - 10.6 mg/dL   Magnesium   Result Value Ref Range    Magnesium 2.10 1.80 - 2.40 mg/dL   Phosphorus   Result Value Ref Range    Phosphorus 5.4 (H) 2.5 - 4.9 mg/dL   Magnesium   Result Value Ref Range    Magnesium 2.30 1.80 - 2.40 mg/dL   Phosphorus   Result Value Ref Range    Phosphorus 6.8 (H) 2.5 - 4.9 mg/dL   Blood Gas, Arterial   Result Value Ref Range    pH, Arterial 6.688 (LL) 7.350 - 7.450    pCO2, Arterial 42.7 35.0 - 45.0 mmHg    pO2, Arterial 172.6 (H) 75.0 - 108.0 mmHg    HCO3, Arterial 5.0 (L) 21.0 - 29.0 mmol/L    Base Excess, Arterial -31.9 (L) -3.0 - 3.0 mmol/L    Hemoglobin, Art, Extended 14.6 13.5 - 17.5 g/dL    O2 Sat, Arterial 96.6 >92 %    Carboxyhgb, Arterial 0.3 0.0 - 1.5 %    Methemoglobin, Arterial 0.5 <1.5 %    TCO2, Arterial 6.3 Not Established mmol/L    O2 Therapy Unknown    Comprehensive Metabolic Panel w/ Reflex to MG   Result Value Ref Range    Sodium 131 (L) 136 - 145 mmol/L    Potassium reflex Magnesium 5.2 (H) 3.5 - 5.1 mmol/L    Chloride 94 (L) 99 - 110 mmol/L    CO2 5 (LL) 21 - 32 mmol/L    Anion Gap 32 (H) 3 - 16    Glucose 715 (HH) 70 - 99 mg/dL    BUN 21 (H) 7 - 20 mg/dL    CREATININE 1.4 (H) 0.9 - 1.3 mg/dL    GFR Non- 57 (A) >60    GFR African American >60 >60    Calcium 8.6 8.3 - 10.6 mg/dL    Total Protein 7.7 6.4 - 8.2 g/dL    Albumin 4.6 3.4 - 5.0 g/dL    Albumin/Globulin Ratio 1.5 1.1 - 2.2    Total Bilirubin 0.3 0.0 - 1.0 mg/dL    Alkaline Phosphatase 208 (H) 40 - 129 U/L    ALT 17 10 - 40 U/L    AST 60 (H) 15 - 37 U/L    Globulin 3.1 g/dL   Lactate, Sepsis   Result Value Ref Range    Lactic Acid, Sepsis 1.4 0.4 - 1.9 mmol/L   Blood Gas, Arterial   Result Value Ref Range    pH, Arterial 7.025 (LL) 7.350 - 7.450    pCO2, Arterial 28.5 (L) 35.0 - 45.0 mmHg    pO2, Arterial 176.1 (H) 75.0 - 108.0 mmHg    HCO3, Arterial 7.3 (L) 21.0 - 29.0 mmol/L    Base Excess, Arterial -22.4 (L) -3.0 - 3.0 mmol/L    Hemoglobin, Art, Extended 13.3 (L) 13.5 - 17.5 g/dL    O2 Sat, Arterial 98.5 >92 %    Carboxyhgb, Arterial 0.0 0.0 - 1.5 %    Methemoglobin, Arterial 0.2 <1.5 %    TCO2, Arterial 8.2 Not Established mmol/L    O2 Therapy See comment    POCT Glucose   Result Value Ref Range    POC Glucose >600 (AA) 70 - 99 mg/dl    Performed on ACCU-CHEK    POCT Glucose   Result Value Ref Range    POC Glucose >600 (AA) 70 - 99 mg/dl    Performed on ACCU-CHEK    POCT Glucose   Result Value Ref Range    POC Glucose 517 (H) 70 - 99 mg/dl    Performed on ACCU-CHEK    POCT Glucose   Result Value Ref Range    POC Glucose 430 (H) 70 - 99 mg/dl    Performed on ACCU-CHEK    POCT Glucose   Result Value Ref Range    POC Glucose 337 (H) 70 - 99 mg/dl    Performed on ACCU-CHEK        38 y.o. M presented for altered mental status and signs and symptoms were concerning for DKA. We initially held off intubation because he appeared to be awake and had good respiratory drive. He was hypoventilating to compensate for his metabolic acidosis and I was concerned that intubation could potentially make his metabolic acidosis worse. As patient became more awake, he also became more violent and tried to pull out IVs and also tried to climb out of bed. In order to protect him, we made a decision to intubate. I supervised VIOLET Bazzi on intubation. Patient was difficult to intubate so I took over. Procedure Note:  Orotracheal Intubation  Indication: AMS  Procedure: The patient was preoxygenated with 100% oxygen and had O2 saturations what were in the % range. Following preoxygenation, the patient was given 20mg etomidate & 80mg rocuronium. After several minutes, patient was still biting so I asked for another 20mg etomidate. A glidascope size 3 blade was inserted into the patients mouth and a grade 1 view of the glottis was obtained. An 8.0 ET tube was then visualized passing between the cords and was secured at 23cm at the lip. The patient had condensation in the ET tube, CO2 capnogroaphic color change and had bilateral breath sounds appreciated.  Respiratory therapy was at the bedside and is assisting bedside care, reevaluation, review of records, and consultation. There was a high probability of clinically significant life-threatening deterioration in the patient's condition, which required my urgent intervention. This chart was generated in part by using Dragon Dictation system and may contain errors related to that system including errors in grammar, punctuation, and spelling, as well as words and phrases that may be inappropriate. If there are any questions or concerns please feel free to contact the dictating provider for clarification.           Mark Anthony Viera MD  09/07/21 1430

## 2021-09-08 NOTE — PROGRESS NOTES
Internal Medicine ICU Progress Note      Events of Last 24 hours:     38-year-old white male who brought in the hospital with DKA. He has a history of diabetes mellitus type 1, juvenile rheumatoid arthritis, celiac disease, dermatomyositis, hypertension, hyperlipidemia, schizoaffective disorder. He was eve in for altered mental status. Work-up showed DKA. t his pH is 6.8 and he was intubated for severe respiratory distress and acidosis. He had a brief run of V. tach. He had a white cell count of 32,000 and hyperkalemia. He is admitted the ICU. This morning he is sedated on the ventilator. He had. Of agitation last night. He has a low-grade fever.       Invasive Lines: PIV x 3       MV: Intubated on 2021    Recent Labs     21  0422   PHART 7.025* 7.223*   [id number] 28.5* 33.0*   PO2ART 176.1* 65.5*       MV Settings:  Vent Mode: AC/VC Rate Set: 30 bmp/Vt Ordered: 450 mL/ /FiO2 : 30 %    IV:   propofol 50 mcg/kg/min (21 1355)    dextrose 5 % and 0.45 % NaCl 150 mL/hr at 21 0727    insulin 5.52 Units/hr (21 1403)    dextrose 5 % and 0.45 % NaCl 150 mL/hr at 21 1407    midazolam 4 mg/hr (21 2253)       Vitals:  Temp  Av.3 °F (38.5 °C)  Min: 100.4 °F (38 °C)  Max: 101.7 °F (38.7 °C)  Pulse  Av.9  Min: 108  Max: 156  BP  Min: 103/41  Max: 188/125  SpO2  Av.2 %  Min: 93 %  Max: 100 %  FiO2   Av %  Min: 30 %  Max: 40 %  Patient Vitals for the past 4 hrs:   BP Temp Temp src Pulse Resp SpO2 Height   21 1400 125/85 -- -- 108 29 100 % --   21 1300 120/77 -- -- 112 (!) 31 100 % --   21 1200 127/80 100.6 °F (38.1 °C) Bladder 118 30 100 % --   21 1121 -- -- -- -- -- -- 5' 9\" (1.753 m)   21 1116 -- -- -- -- -- -- 5' 9\" (1.753 m)   21 1100 133/79 100.4 °F (38 °C) Bladder 118 30 99 % --       CVP:        Intake/Output Summary (Last 24 hours) at 2021 1422  Last data filed at 2021 1210  Gross per 24 hour   Intake 7014.92 ml   Output 4025 ml   Net 2989.92 ml       EXAM:  General: Sick appearing. Sedated on the ventilator. Eyes: PERRL. No sclera icterus. No conjunctiva injected. ENT: No discharge. Intubated  Neck: Trachea midline. Normal thyroid. Resp: No accessory muscle use. Right lower lobe crackles. No wheezing. No rhonchi. No dullness on percussion. CV: Regular rate. Regular rhythm. No mumur or rub. No edema. No JVD. Palpable pedal pulses. GI: Non-tender. Non-distended. No masses. No organmegaly. Normal bowel sounds. No hernia. Skin: Warm and dry. No nodule on exposed extremities. No rash on exposed extremities. Lymph: No cervical LAD. No supraclavicular LAD. M/S: No cyanosis. No joint deformity. No clubbing. Neuro: Intubated  Psych:  Intubated        Medications:  Scheduled Meds:   cefepime  1,000 mg IntraVENous Q8H    mupirocin   Nasal BID    sodium phosphate IVPB  30 mmol IntraVENous Once    azithromycin  500 mg IntraVENous Q24H    [START ON 9/9/2021] vancomycin  1,250 mg IntraVENous Q24H    chlorhexidine  15 mL Mouth/Throat BID    famotidine (PEPCID) injection  20 mg IntraVENous BID    enoxaparin  40 mg SubCUTAneous Daily    carboxymethylcellulose PF  1 drop Both Eyes Q4H    And    artificial tears   Both Eyes Q4H       PRN Meds:  acetaminophen, ondansetron, dextrose, potassium chloride, magnesium sulfate, sodium phosphate IVPB **OR** sodium phosphate IVPB **OR** sodium phosphate IVPB, dextrose 5 % and 0.45 % NaCl, dextrose, potassium chloride, magnesium sulfate, polyethylene glycol, dextrose 5 % and 0.45 % NaCl, fentanNYL, midazolam    Results:  CBC:   Recent Labs     09/07/21  1607 09/08/21  0404   WBC 31.6* 17.8*   HGB 14.0 13.4*   HCT 48.6 41.2   .5* 93.0    200     BMP:   Recent Labs     09/08/21  0011 09/08/21  0011 09/08/21  0405 09/08/21  0821 09/08/21  1356      < > 137 135* 135*   K 5.5*   < > 4.1 4.1 4.1      < > 109 108 106   CO2 8*   < > 10* 12* 14*   PHOS 2.8  --  1.1* 1.0*  --    BUN 21*   < > 21* 20 20   CREATININE 1.5*   < > 1.8* 2.0* 2.2*    < > = values in this interval not displayed. LIVER PROFILE:   Recent Labs     09/07/21  1601 09/07/21  1723   AST 16 60*   ALT 13 17   BILITOT <0.2 0.3   ALKPHOS 208* 208*     Cultures:  Results for Hal Dai (MRN [id number]) as of 9/8/2021 14:27   Ref. Range 9/7/2021 17:17   INFLUENZA A Latest Ref Range: NOT DETECTED  NOT DETECTED   INFLUENZA B Latest Ref Range: NOT DETECTED  NOT DETECTED   SARS-CoV-2 RNA, RT PCR Latest Ref Range: NOT DETECTED  NOT DETECTED       Films:    XR CHEST PORTABLE   Final Result   Stable right-sided pulmonary infiltrate. Satisfactory position of endotracheal tube         XR CHEST PORTABLE   Final Result   1. Developing multifocal right-sided pneumonia. 2. NG tube placement as above. Advancement by 10 cm recommended. XR CHEST PORTABLE   Final Result   Negative low lung volume portable chest                Assessment:    Principal Problem:    DKA, type 1, not at goal [facility])  Active Problems:    HTN (hypertension)    Hyperlipidemia    Schizoaffective disorder (HCC)    NOVA (acute kidney injury) ([facility] [address]    HCAP (healthcare-associated pneumonia)    Acute respiratory failure with hypoxia (HCC)    Acute metabolic encephalopathy  Resolved Problems:    * No resolved hospital problems. *           Plan:    #DKA with type 1 diabetes. Admitted to hospital with DKA. Started on IV insulin. DKA protocol. His anion gap is 15 and his metabolic CO2 was 14. Continue insulin drip and IV fluids. Monitor and replace electrolytes as needed. Phosphorus low. He will get K-Phos. #Acute hypoxemic respiratory failure. Intubated for respiratory distress. Seen by pulmonologist.  Continue mechanical ventilation. Continue sedation. COVID-19 negative. #NOVA. On IV fluids. Monitor strict I's and O's. #Right lower lobe pneumonia.   Risk for gram-negative and MRSA

## 2021-09-08 NOTE — PROGRESS NOTES
Rounding completed with Dr. Aleyda Coronado and interdisciplinary team.  POC, labs, lines and assessment reviewed. -replace another dose of sodium phosphate see MAR  -repeat CXR for ETT placement  -okay to cap OG remove from continuous suction.

## 2021-09-08 NOTE — PLAN OF CARE
Nutrition Problem #1: Inadequate oral intake  Intervention: Food and/or Nutrient Delivery: Continue NPO, Start Tube Feeding  Nutritional Goals: patient will tolerate Vital High-Protein at goal rate of 55 ml/hr x 20 hours without GI distress, without s/s of aspiration, and without additional lab/fluid disturbances

## 2021-09-08 NOTE — CONSULTS
Patient is being seen at the request of Dr. Nasreen Roberts for a consultation for Acute respiratory failure with hypoxemia; DKA    HISTORY OF PRESENT ILLNESS: The patient is a 75-year-old man with a past medical history of type 1 diabetes, dermatomyositis, juvenile rheumatoid arthritis, celiac disease who was brought in by ambulance to ER last night with complaints of altered mental status. Patient had fingerstick blood glucose checked and it read high on EMS machine. He was given 1 L of IV fluid and transferred to the ER for further care. Upon arrival patient was noted to be very confused and unable to follow commands. He had laboratory evidence of severe diabetic ketoacidosis with a pH of 6.8. He was intubated and placed on mechanical ventilation for severe respiratory distress and severe acidosis. At 1 point patient had a brief run of ventricular tachycardia and he was given an amp of bicarbonate and had bag mask ventilation for hyperventilation with resolution. His laboratory evaluation also revealed a leukocytosis of 32,000 and hyperkalemia with a potassium of 6.5. He was given IV fluids and started on insulin drip and admitted to ICU for further care.     PAST MEDICAL HISTORY:  Past Medical History:   Diagnosis Date    Anesthesia complication     BLOOD PRESSURE DROP IN OR DURING THR    Celiac disease     Dermatomyositis ([address]     Last attack 2009    Diabetes mellitus ([address]     TYPE I    Fibromyalgia     Hyperlipidemia     Hypertension     Juvenile rheumatoid arthritis ([address]     Movement disorder     DERMATOMYOSITIS/IN REMISSION    Rheumatoid arthritis(714.0)     Vertebral basilar insufficiency     symptoms related to JRA in cervical area    Wears glasses      PAST SURGICAL HISTORY:  Past Surgical History:   Procedure Laterality Date    HIP SURGERY      bilateral    JOINT REPLACEMENT Bilateral     THR    JOINT REPLACEMENT Left 6/3/2016    Revision left lateral total hip replacement    KNEE ARTHROSCOPY Right     KNEE ARTHROSCOPY Left 9/25/2013    medial & lateral Meniscectomy    LIVER BIOPSY      MUSCLE BIOPSY      TYMPANOSTOMY TUBE PLACEMENT      WRIST SURGERY Right     CYST       FAMILY HISTORY:  family history includes Diabetes in his father; Heart Disease in his maternal grandmother; High Blood Pressure in his mother. SOCIAL HISTORY:   reports that he has never smoked. He has never used smokeless tobacco.    Scheduled Meds:   cefepime  1,000 mg IntraVENous Q8H    vancomycin  1,250 mg IntraVENous Q18H    chlorhexidine  15 mL Mouth/Throat BID    famotidine (PEPCID) injection  20 mg IntraVENous BID    enoxaparin  40 mg SubCUTAneous Daily    carboxymethylcellulose PF  1 drop Both Eyes Q4H    And    artificial tears   Both Eyes Q4H     Continuous Infusions:   propofol 50 mcg/kg/min (09/08/21 0341)    dextrose 5 % and 0.45 % NaCl      insulin 3.5 Units/hr (09/08/21 0556)    dextrose 5 % and 0.45 % NaCl 150 mL/hr at 09/08/21 0119    midazolam 4 mg/hr (09/07/21 5053)     PRN Meds:  acetaminophen, ondansetron, dextrose, potassium chloride, magnesium sulfate, sodium phosphate IVPB **OR** sodium phosphate IVPB **OR** sodium phosphate IVPB, dextrose 5 % and 0.45 % NaCl, dextrose, potassium chloride, magnesium sulfate, sodium phosphate IVPB **OR** sodium phosphate IVPB **OR** sodium phosphate IVPB, polyethylene glycol, dextrose 5 % and 0.45 % NaCl, fentanNYL, midazolam    ALLERGIES:  Patient is allergic to aspirin. REVIEW OF SYSTEMS:  Unobtainable as intubated and sedated    PHYSICAL EXAM:  Blood pressure (!) 147/89, pulse 139, temperature 101.6 °F (38.7 °C), temperature source Bladder, resp. rate (!) 36, weight 179 lb 3.7 oz (81.3 kg), SpO2 97 %.' on vent  Gen: No distress. Eyes: PERRL. No sclera icterus. No conjunctival injection. ENT: No discharge. Pharynx clear. Neck: Trachea midline. No obvious mass. Resp: No accessory muscle use. R sided crackles. No wheezes. No rhonchi.  No dullness on percussion. CV: Regular rate. Regular rhythm. No murmur or rub. No edema. Skin: Warm and dry. No nodule on exposed extremities. M/S: No cyanosis. No joint deformity. No clubbing. Neuro: sedated, not moving extremities    LABS:  CBC:   Recent Labs     09/07/21  1607 09/08/21  0404   WBC 31.6* 17.8*   HGB 14.0 13.4*   HCT 48.6 41.2   .5* 93.0    200     BMP:   Recent Labs     09/07/21 2004 09/08/21  0011 09/08/21  0405    136 137   K 4.7 5.5* 4.1    104 109   CO2 6* 8* 10*   PHOS 5.4* 2.8 1.1*   BUN 21* 21* 21*   CREATININE 1.5* 1.5* 1.8*     LIVER PROFILE:   Recent Labs     09/07/21  1601 09/07/21  1723   AST 16 60*   ALT 13 17   BILITOT <0.2 0.3   ALKPHOS 208* 208*     PT/INR: No results for input(s): PROTIME, INR in the last 72 hours. APTT: No results for input(s): APTT in the last 72 hours. UA:No results for input(s): NITRITE, COLORU, PHUR, LABCAST, WBCUA, RBCUA, MUCUS, TRICHOMONAS, YEAST, BACTERIA, CLARITYU, SPECGRAV, LEUKOCYTESUR, UROBILINOGEN, BILIRUBINUR, BLOODU, GLUCOSEU, AMORPHOUS in the last 72 hours. Invalid input(s): KETONESU  Recent Labs     09/07/21  2130 09/08/21  0422   PHART 7.025* 7.223*   ELO5GCW 28.5* 33.0*   PO2ART 176.1* 65.5*       Chest imaging was reviewed by me and showed:  CXR: NG tube is been placed with side port overlying the expected region of the GE junction.  There has been interval development of dense consolidation within the right perihilar region and right lower lobe.  The left lung remains clear.  Heart size and vascularity are stable.  There is no pneumothorax or Effusion.  ETT 1-2 cm above miguelito    9/7 sars Cov2- neg     ASSESSMENT:  ·  Acute respiratory failure with hypoxemia  · RLL pneumonia - Healthcare associated pneumonia - probable gram negative, risk for methicillin resistant Staph aureus as well   · Severe diabetic ketoacidosis   · NOVA  · Pseudohyponatremia  · Elevated liver enzymes  · Acute metabolic encephalopathy      PLAN:  Mechanical ventilation per my orders. The ventilator was adjusted by me at the bedside for unstable, life threatening respiratory failure. HOB greater than 30 degrees at all times  Daily SBT once FIO2<60% and PEEP = 5, patient arousable, hemodynamically stable  IV Sedation with propofol and fentanyl gtt targeted RASS -2  ABX: vanc and cefepime D2; add azithromycin  Blood, respiratory and urine cultures  Aggressive IVF resuscitation with normal saline per DKA protocol; transition to d5 1/2NS when BG <200  FSBG q hour  Insulin gtt at 0.1 U/kg; adjust based upon FSBG  Replace K per protocol  Replace Na phos  q4 hour electrolyte panel  patient on telemetry  · Follow serial blood gases    Patient is critically ill. Critical care time spent reviewing labs/films, examining patient, collaborating with other physicians but excluding procedures for life threatening organ failure is 35 minutes.

## 2021-09-08 NOTE — PROGRESS NOTES
09/07/21 2357   Vent Information   Vt Ordered 450 mL   Rate Set 30 bmp   Peak Flow 60 L/min   Pressure Support 0 cmH20   FiO2  40 %   SpO2 100 %   SpO2/FiO2 ratio 250   Sensitivity 3   PEEP/CPAP 5   I Time/ I Time % 0 s   Humidification Source HME   Vent Patient Data   High Peep/I Pressure 0   Peak Inspiratory Pressure 13 cmH2O   Mean Airway Pressure 1.8 cmH20   Rate Measured 32 br/min   Vt Exhaled 462 mL   Minute Volume 13.7 Liters   I:E Ratio 1:1.40   Cough/Sputum   Sputum How Obtained Endotracheal;Suctioned   Cough Productive   Sputum Amount Small   Sputum Color Creamy   Tenacity Thick   Spontaneous Breathing Trial (SBT) RT Doc   Pulse 154   Breath Sounds   Right Upper Lobe Diminished   Right Middle Lobe Diminished   Right Lower Lobe Diminished   Left Upper Lobe Diminished   Left Lower Lobe Diminished   Additional Respiratory  Assessments   Resp (!) 31   Position Semi-Vila's   Alarm Settings   High Pressure Alarm 40 cmH2O   Low Minute Volume Alarm 2.5 L/min   High Respiratory Rate 45 br/min   Patient Observation   Observations ETT SIZE 8.0, SECURED AT 23 LIP LINE. AMBU BAG AT HEAD OF BED.     Non-Surgical Airway 09/07/21 Endo Tracheal Tube   Placement Date/Time: 09/07/21 1659   Placed By: In ED  Inserted by: Dr Janki Dai  Airway Device: Endo Tracheal Tube  Size: 8   Secured at 23 cm   Measured From [address] [facility] By Commercial tube anne   Site Condition Dry

## 2021-09-08 NOTE — PLAN OF CARE

## 2021-09-09 ENCOUNTER — APPOINTMENT (OUTPATIENT)
Dept: GENERAL RADIOLOGY | Age: 36
DRG: 207 | End: 2021-09-09
Payer: COMMERCIAL

## 2021-09-09 LAB
ALBUMIN SERPL-MCNC: 3.3 G/DL (ref 3.4–5)
ALP BLD-CCNC: 136 U/L (ref 40–129)
ALT SERPL-CCNC: 14 U/L (ref 10–40)
ANION GAP SERPL CALCULATED.3IONS-SCNC: 14 MMOL/L (ref 3–16)
ANION GAP SERPL CALCULATED.3IONS-SCNC: 16 MMOL/L (ref 3–16)
ANION GAP SERPL CALCULATED.3IONS-SCNC: 9 MMOL/L (ref 3–16)
AST SERPL-CCNC: 24 U/L (ref 15–37)
BASE EXCESS ARTERIAL: -11.1 MMOL/L (ref -3–3)
BASOPHILS ABSOLUTE: 0.1 K/UL (ref 0–0.2)
BASOPHILS RELATIVE PERCENT: 0.7 %
BILIRUB SERPL-MCNC: <0.2 MG/DL (ref 0–1)
BILIRUBIN DIRECT: <0.2 MG/DL (ref 0–0.3)
BILIRUBIN, INDIRECT: ABNORMAL MG/DL (ref 0–1)
BUN BLDV-MCNC: 18 MG/DL (ref 7–20)
BUN BLDV-MCNC: 19 MG/DL (ref 7–20)
BUN BLDV-MCNC: 20 MG/DL (ref 7–20)
CALCIUM SERPL-MCNC: 7.4 MG/DL (ref 8.3–10.6)
CALCIUM SERPL-MCNC: 8 MG/DL (ref 8.3–10.6)
CALCIUM SERPL-MCNC: 8.1 MG/DL (ref 8.3–10.6)
CARBOXYHEMOGLOBIN ARTERIAL: 0.3 % (ref 0–1.5)
CHLORIDE BLD-SCNC: 108 MMOL/L (ref 99–110)
CHLORIDE BLD-SCNC: 108 MMOL/L (ref 99–110)
CHLORIDE BLD-SCNC: 110 MMOL/L (ref 99–110)
CO2: 11 MMOL/L (ref 21–32)
CO2: 12 MMOL/L (ref 21–32)
CO2: 12 MMOL/L (ref 21–32)
CREAT SERPL-MCNC: 2.4 MG/DL (ref 0.9–1.3)
CREAT SERPL-MCNC: 2.5 MG/DL (ref 0.9–1.3)
CREAT SERPL-MCNC: 2.6 MG/DL (ref 0.9–1.3)
EOSINOPHILS ABSOLUTE: 0 K/UL (ref 0–0.6)
EOSINOPHILS RELATIVE PERCENT: 0.1 %
GFR AFRICAN AMERICAN: 34
GFR AFRICAN AMERICAN: 35
GFR AFRICAN AMERICAN: 37
GFR NON-AFRICAN AMERICAN: 28
GFR NON-AFRICAN AMERICAN: 29
GFR NON-AFRICAN AMERICAN: 30
GLUCOSE BLD-MCNC: 129 MG/DL (ref 70–99)
GLUCOSE BLD-MCNC: 138 MG/DL (ref 70–99)
GLUCOSE BLD-MCNC: 141 MG/DL (ref 70–99)
GLUCOSE BLD-MCNC: 153 MG/DL (ref 70–99)
GLUCOSE BLD-MCNC: 161 MG/DL (ref 70–99)
GLUCOSE BLD-MCNC: 164 MG/DL (ref 70–99)
GLUCOSE BLD-MCNC: 181 MG/DL (ref 70–99)
GLUCOSE BLD-MCNC: 188 MG/DL (ref 70–99)
GLUCOSE BLD-MCNC: 225 MG/DL (ref 70–99)
GLUCOSE BLD-MCNC: 230 MG/DL (ref 70–99)
GLUCOSE BLD-MCNC: 253 MG/DL (ref 70–99)
GLUCOSE BLD-MCNC: 307 MG/DL (ref 70–99)
GLUCOSE BLD-MCNC: 309 MG/DL (ref 70–99)
GLUCOSE BLD-MCNC: 392 MG/DL (ref 70–99)
HCO3 ARTERIAL: 13.5 MMOL/L (ref 21–29)
HCT VFR BLD CALC: 41.4 % (ref 40.5–52.5)
HEMATOLOGY PATH CONSULT: NORMAL
HEMOGLOBIN, ART, EXTENDED: 12.6 G/DL (ref 13.5–17.5)
HEMOGLOBIN: 13.6 G/DL (ref 13.5–17.5)
LACTIC ACID: 0.8 MMOL/L (ref 0.4–2)
LACTIC ACID: 2.5 MMOL/L (ref 0.4–2)
LYMPHOCYTES ABSOLUTE: 1.1 K/UL (ref 1–5.1)
LYMPHOCYTES RELATIVE PERCENT: 13.8 %
MAGNESIUM: 1.8 MG/DL (ref 1.8–2.4)
MAGNESIUM: 1.9 MG/DL (ref 1.8–2.4)
MAGNESIUM: 1.9 MG/DL (ref 1.8–2.4)
MCH RBC QN AUTO: 30.5 PG (ref 26–34)
MCHC RBC AUTO-ENTMCNC: 32.8 G/DL (ref 31–36)
MCV RBC AUTO: 92.7 FL (ref 80–100)
METHEMOGLOBIN ARTERIAL: 0.3 %
MONOCYTES ABSOLUTE: 0.9 K/UL (ref 0–1.3)
MONOCYTES RELATIVE PERCENT: 11.5 %
NEUTROPHILS ABSOLUTE: 5.8 K/UL (ref 1.7–7.7)
NEUTROPHILS RELATIVE PERCENT: 73.9 %
O2 CONTENT ARTERIAL: 17 ML/DL
O2 SAT, ARTERIAL: 97.1 %
O2 THERAPY: ABNORMAL
PCO2 ARTERIAL: 27.3 MMHG (ref 35–45)
PDW BLD-RTO: 15.2 % (ref 12.4–15.4)
PERFORMED ON: ABNORMAL
PH ARTERIAL: 7.31 (ref 7.35–7.45)
PHOSPHORUS: 3.1 MG/DL (ref 2.5–4.9)
PHOSPHORUS: 3.4 MG/DL (ref 2.5–4.9)
PHOSPHORUS: 3.6 MG/DL (ref 2.5–4.9)
PLATELET # BLD: 110 K/UL (ref 135–450)
PMV BLD AUTO: 8.3 FL (ref 5–10.5)
PO2 ARTERIAL: 99 MMHG (ref 75–108)
POTASSIUM SERPL-SCNC: 3.5 MMOL/L (ref 3.5–5.1)
POTASSIUM SERPL-SCNC: 4 MMOL/L (ref 3.5–5.1)
POTASSIUM SERPL-SCNC: ABNORMAL MMOL/L (ref 3.5–5.1)
RBC # BLD: 4.46 M/UL (ref 4.2–5.9)
SODIUM BLD-SCNC: 131 MMOL/L (ref 136–145)
SODIUM BLD-SCNC: 134 MMOL/L (ref 136–145)
SODIUM BLD-SCNC: 135 MMOL/L (ref 136–145)
TCO2 ARTERIAL: 14.4 MMOL/L
TOTAL PROTEIN: 6.4 G/DL (ref 6.4–8.2)
VANCOMYCIN TROUGH: 27.3 UG/ML (ref 10–20)
WBC # BLD: 7.8 K/UL (ref 4–11)

## 2021-09-09 PROCEDURE — 80076 HEPATIC FUNCTION PANEL: CPT

## 2021-09-09 PROCEDURE — 2500000003 HC RX 250 WO HCPCS: Performed by: INTERNAL MEDICINE

## 2021-09-09 PROCEDURE — 86022 PLATELET ANTIBODIES: CPT

## 2021-09-09 PROCEDURE — 80202 ASSAY OF VANCOMYCIN: CPT

## 2021-09-09 PROCEDURE — 2000000000 HC ICU R&B

## 2021-09-09 PROCEDURE — 36600 WITHDRAWAL OF ARTERIAL BLOOD: CPT

## 2021-09-09 PROCEDURE — 2700000000 HC OXYGEN THERAPY PER DAY

## 2021-09-09 PROCEDURE — 94003 VENT MGMT INPAT SUBQ DAY: CPT

## 2021-09-09 PROCEDURE — 2580000003 HC RX 258: Performed by: INTERNAL MEDICINE

## 2021-09-09 PROCEDURE — 84100 ASSAY OF PHOSPHORUS: CPT

## 2021-09-09 PROCEDURE — 85025 COMPLETE CBC W/AUTO DIFF WBC: CPT

## 2021-09-09 PROCEDURE — 6360000002 HC RX W HCPCS: Performed by: INTERNAL MEDICINE

## 2021-09-09 PROCEDURE — 83605 ASSAY OF LACTIC ACID: CPT

## 2021-09-09 PROCEDURE — 80048 BASIC METABOLIC PNL TOTAL CA: CPT

## 2021-09-09 PROCEDURE — 82803 BLOOD GASES ANY COMBINATION: CPT

## 2021-09-09 PROCEDURE — 83735 ASSAY OF MAGNESIUM: CPT

## 2021-09-09 PROCEDURE — 71045 X-RAY EXAM CHEST 1 VIEW: CPT

## 2021-09-09 PROCEDURE — 36415 COLL VENOUS BLD VENIPUNCTURE: CPT

## 2021-09-09 PROCEDURE — 99291 CRITICAL CARE FIRST HOUR: CPT | Performed by: INTERNAL MEDICINE

## 2021-09-09 PROCEDURE — 6370000000 HC RX 637 (ALT 250 FOR IP): Performed by: INTERNAL MEDICINE

## 2021-09-09 PROCEDURE — 94761 N-INVAS EAR/PLS OXIMETRY MLT: CPT

## 2021-09-09 PROCEDURE — 99233 SBSQ HOSP IP/OBS HIGH 50: CPT | Performed by: INTERNAL MEDICINE

## 2021-09-09 RX ORDER — NICOTINE POLACRILEX 4 MG
15 LOZENGE BUCCAL PRN
Status: DISCONTINUED | OUTPATIENT
Start: 2021-09-09 | End: 2021-09-14 | Stop reason: HOSPADM

## 2021-09-09 RX ORDER — DEXTROSE MONOHYDRATE 25 G/50ML
12.5 INJECTION, SOLUTION INTRAVENOUS PRN
Status: DISCONTINUED | OUTPATIENT
Start: 2021-09-09 | End: 2021-09-14 | Stop reason: HOSPADM

## 2021-09-09 RX ORDER — HYDRALAZINE HYDROCHLORIDE 20 MG/ML
10 INJECTION INTRAMUSCULAR; INTRAVENOUS EVERY 4 HOURS PRN
Status: DISCONTINUED | OUTPATIENT
Start: 2021-09-09 | End: 2021-09-14 | Stop reason: HOSPADM

## 2021-09-09 RX ORDER — DEXTROSE MONOHYDRATE 50 MG/ML
100 INJECTION, SOLUTION INTRAVENOUS PRN
Status: DISCONTINUED | OUTPATIENT
Start: 2021-09-09 | End: 2021-09-14 | Stop reason: HOSPADM

## 2021-09-09 RX ORDER — MAGNESIUM SULFATE 1 G/100ML
1000 INJECTION INTRAVENOUS ONCE
Status: COMPLETED | OUTPATIENT
Start: 2021-09-09 | End: 2021-09-09

## 2021-09-09 RX ORDER — INSULIN GLARGINE 100 [IU]/ML
20 INJECTION, SOLUTION SUBCUTANEOUS EVERY MORNING
Status: DISCONTINUED | OUTPATIENT
Start: 2021-09-09 | End: 2021-09-10

## 2021-09-09 RX ADMIN — INSULIN LISPRO 15 UNITS: 100 INJECTION, SOLUTION INTRAVENOUS; SUBCUTANEOUS at 16:38

## 2021-09-09 RX ADMIN — CARBOXYMETHYLCELLULOSE SODIUM 1 DROP: 10 GEL OPHTHALMIC at 12:21

## 2021-09-09 RX ADMIN — SODIUM PHOSPHATE, MONOBASIC, MONOHYDRATE 10 MMOL: 276; 142 INJECTION, SOLUTION INTRAVENOUS at 01:37

## 2021-09-09 RX ADMIN — FENTANYL CITRATE 25 MCG: 0.05 INJECTION, SOLUTION INTRAMUSCULAR; INTRAVENOUS at 18:36

## 2021-09-09 RX ADMIN — POTASSIUM CHLORIDE 10 MEQ: 7.46 INJECTION, SOLUTION INTRAVENOUS at 00:50

## 2021-09-09 RX ADMIN — CARBOXYMETHYLCELLULOSE SODIUM 1 DROP: 10 GEL OPHTHALMIC at 20:03

## 2021-09-09 RX ADMIN — PROPOFOL 50 MCG/KG/MIN: 10 INJECTION, EMULSION INTRAVENOUS at 19:20

## 2021-09-09 RX ADMIN — Medication 15 ML: at 09:30

## 2021-09-09 RX ADMIN — FAMOTIDINE 20 MG: 10 INJECTION INTRAVENOUS at 08:52

## 2021-09-09 RX ADMIN — DEXTROSE MONOHYDRATE 500 MG: 50 INJECTION, SOLUTION INTRAVENOUS at 11:17

## 2021-09-09 RX ADMIN — VANCOMYCIN HYDROCHLORIDE 1250 MG: 10 INJECTION, POWDER, LYOPHILIZED, FOR SOLUTION INTRAVENOUS at 01:58

## 2021-09-09 RX ADMIN — CARBOXYMETHYLCELLULOSE SODIUM 1 DROP: 10 GEL OPHTHALMIC at 08:01

## 2021-09-09 RX ADMIN — PROPOFOL 50 MCG/KG/MIN: 10 INJECTION, EMULSION INTRAVENOUS at 15:55

## 2021-09-09 RX ADMIN — HYDRALAZINE HYDROCHLORIDE 10 MG: 20 INJECTION INTRAMUSCULAR; INTRAVENOUS at 17:15

## 2021-09-09 RX ADMIN — POTASSIUM CHLORIDE 10 MEQ: 7.46 INJECTION, SOLUTION INTRAVENOUS at 09:03

## 2021-09-09 RX ADMIN — CEFEPIME HYDROCHLORIDE 1000 MG: 1 INJECTION, POWDER, FOR SOLUTION INTRAMUSCULAR; INTRAVENOUS at 09:43

## 2021-09-09 RX ADMIN — CARBOXYMETHYLCELLULOSE SODIUM 1 DROP: 10 GEL OPHTHALMIC at 05:17

## 2021-09-09 RX ADMIN — ACETAMINOPHEN 650 MG: 325 TABLET ORAL at 17:15

## 2021-09-09 RX ADMIN — MIDAZOLAM HYDROCHLORIDE 2 MG: 1 INJECTION, SOLUTION INTRAMUSCULAR; INTRAVENOUS at 18:36

## 2021-09-09 RX ADMIN — INSULIN LISPRO 9 UNITS: 100 INJECTION, SOLUTION INTRAVENOUS; SUBCUTANEOUS at 12:19

## 2021-09-09 RX ADMIN — Medication 6 MG/HR: at 11:33

## 2021-09-09 RX ADMIN — CARBOXYMETHYLCELLULOSE SODIUM 1 DROP: 10 GEL OPHTHALMIC at 13:30

## 2021-09-09 RX ADMIN — POTASSIUM CHLORIDE 10 MEQ: 7.46 INJECTION, SOLUTION INTRAVENOUS at 01:52

## 2021-09-09 RX ADMIN — PROPOFOL 50 MCG/KG/MIN: 10 INJECTION, EMULSION INTRAVENOUS at 12:18

## 2021-09-09 RX ADMIN — DEXTROSE AND SODIUM CHLORIDE: 5; 450 INJECTION, SOLUTION INTRAVENOUS at 09:01

## 2021-09-09 RX ADMIN — PROPOFOL 50 MCG/KG/MIN: 10 INJECTION, EMULSION INTRAVENOUS at 02:24

## 2021-09-09 RX ADMIN — INSULIN LISPRO 9 UNITS: 100 INJECTION, SOLUTION INTRAVENOUS; SUBCUTANEOUS at 20:08

## 2021-09-09 RX ADMIN — WHITE PETROLATUM 57.7 %-MINERAL OIL 31.9 % EYE OINTMENT: at 01:52

## 2021-09-09 RX ADMIN — Medication 15 ML: at 20:03

## 2021-09-09 RX ADMIN — POTASSIUM CHLORIDE 10 MEQ: 7.46 INJECTION, SOLUTION INTRAVENOUS at 07:56

## 2021-09-09 RX ADMIN — WHITE PETROLATUM 57.7 %-MINERAL OIL 31.9 % EYE OINTMENT: at 13:45

## 2021-09-09 RX ADMIN — PROPOFOL 50 MCG/KG/MIN: 10 INJECTION, EMULSION INTRAVENOUS at 05:39

## 2021-09-09 RX ADMIN — INSULIN GLARGINE 20 UNITS: 100 INJECTION, SOLUTION SUBCUTANEOUS at 10:00

## 2021-09-09 RX ADMIN — DEXTROSE AND SODIUM CHLORIDE: 5; 450 INJECTION, SOLUTION INTRAVENOUS at 02:06

## 2021-09-09 RX ADMIN — WHITE PETROLATUM 57.7 %-MINERAL OIL 31.9 % EYE OINTMENT: at 16:34

## 2021-09-09 RX ADMIN — MUPIROCIN: 20 OINTMENT TOPICAL at 08:52

## 2021-09-09 RX ADMIN — WHITE PETROLATUM 57.7 %-MINERAL OIL 31.9 % EYE OINTMENT: at 09:30

## 2021-09-09 RX ADMIN — PROPOFOL 50 MCG/KG/MIN: 10 INJECTION, EMULSION INTRAVENOUS at 08:51

## 2021-09-09 RX ADMIN — MIDAZOLAM HYDROCHLORIDE 2 MG: 1 INJECTION, SOLUTION INTRAMUSCULAR; INTRAVENOUS at 04:40

## 2021-09-09 RX ADMIN — MAGNESIUM SULFATE HEPTAHYDRATE 1000 MG: 1 INJECTION, SOLUTION INTRAVENOUS at 10:42

## 2021-09-09 RX ADMIN — POTASSIUM CHLORIDE 10 MEQ: 7.46 INJECTION, SOLUTION INTRAVENOUS at 06:22

## 2021-09-09 RX ADMIN — MUPIROCIN: 20 OINTMENT TOPICAL at 20:03

## 2021-09-09 RX ADMIN — WHITE PETROLATUM 57.7 %-MINERAL OIL 31.9 % EYE OINTMENT: at 20:03

## 2021-09-09 RX ADMIN — CEFEPIME HYDROCHLORIDE 1000 MG: 1 INJECTION, POWDER, FOR SOLUTION INTRAMUSCULAR; INTRAVENOUS at 00:39

## 2021-09-09 RX ADMIN — CARBOXYMETHYLCELLULOSE SODIUM 1 DROP: 10 GEL OPHTHALMIC at 13:00

## 2021-09-09 RX ADMIN — PROPOFOL 50 MCG/KG/MIN: 10 INJECTION, EMULSION INTRAVENOUS at 22:15

## 2021-09-09 RX ADMIN — WHITE PETROLATUM 57.7 %-MINERAL OIL 31.9 % EYE OINTMENT: at 05:17

## 2021-09-09 RX ADMIN — CEFEPIME HYDROCHLORIDE 1000 MG: 1 INJECTION, POWDER, FOR SOLUTION INTRAMUSCULAR; INTRAVENOUS at 17:16

## 2021-09-09 ASSESSMENT — PULMONARY FUNCTION TESTS
PIF_VALUE: 39
PIF_VALUE: 28
PIF_VALUE: 28
PIF_VALUE: 13
PIF_VALUE: 28
PIF_VALUE: 24
PIF_VALUE: 23
PIF_VALUE: 26
PIF_VALUE: 18
PIF_VALUE: 32
PIF_VALUE: 25
PIF_VALUE: 24
PIF_VALUE: 26
PIF_VALUE: 27
PIF_VALUE: 16
PIF_VALUE: 29
PIF_VALUE: 25
PIF_VALUE: 24
PIF_VALUE: 28
PIF_VALUE: 12
PIF_VALUE: 25
PIF_VALUE: 24

## 2021-09-09 ASSESSMENT — PAIN SCALES - GENERAL
PAINLEVEL_OUTOF10: 0

## 2021-09-09 NOTE — PROGRESS NOTES
Total of 60 meqs of KCL given, and 15 mmol sodium phos given, an additional dose of 10 mmol of NA P infusing at this time

## 2021-09-09 NOTE — PROGRESS NOTES
Spoke to Dr. Jeannette Doshi about patient's sodium being 131 and dropped from 133 previously on Q4 BMP. Patient has had 2.5 liters out of del rosario catheter for the night.  Ordered to maintain current drips until Am, as Dr. Xavi Garrett instructed he would DC DKA protocol in am. Will continue to monitor

## 2021-09-09 NOTE — PROGRESS NOTES
Per Lisa Hough nurse, Dr. Rika Pringle instructed to hold tube feeds until am when DKA protocol is D/c'd

## 2021-09-09 NOTE — PROGRESS NOTES
09/09/21 0023   Vent Information   Vent Type 840   Vent Mode AC/VC   Vt Ordered 450 mL   Rate Set 30 bmp   Peak Flow 90 L/min   Pressure Support 0 cmH20   FiO2  30 %   SpO2 100 %   SpO2/FiO2 ratio 333.33   Sensitivity 3   PEEP/CPAP 5   I Time/ I Time % 0 s   Humidification Source HME   Vent Patient Data   High Peep/I Pressure 0   Peak Inspiratory Pressure 23 cmH2O   Mean Airway Pressure 10 cmH20   Rate Measured 30 br/min   Vt Exhaled 433 mL   Minute Volume 12.9 Liters   I:E Ratio 1:2.70   Cough/Sputum   Sputum How Obtained Endotracheal;Suctioned   Cough Productive   Sputum Amount Moderate   Sputum Color Creamy; Tan   Tenacity Thick   Spontaneous Breathing Trial (SBT) RT Doc   Pulse 94   Breath Sounds   Right Upper Lobe Diminished   Right Middle Lobe Diminished   Right Lower Lobe Diminished   Left Upper Lobe Diminished   Left Lower Lobe Diminished   Additional Respiratory  Assessments   Resp 30   Position Semi-Vila's   Alarm Settings   High Pressure Alarm 40 cmH2O   Low Minute Volume Alarm 2.5 L/min   High Respiratory Rate 45 br/min   Patient Observation   Observations ETT SIZE 8.0, SECURED AT 24 LIP LINE. AMBU BAG AT HEAD OF BED.      Non-Surgical Airway 09/07/21 Endo Tracheal Tube   Placement Date/Time: 09/07/21 1650   Placed By: In ED  Inserted by: Dr Scot Clements  Airway Device: Endo Tracheal Tube  Size: 8   Secured at 24 cm   Measured From Lips   Secured Location Right   Secured By Commercial tube anne   Site Condition Dry

## 2021-09-09 NOTE — PROGRESS NOTES
Vanc-Day 3  Renal Panel is ordered q4h, last Srcr at 11am is 2.5. Vanc random@ 11am  (27.3)  Will hold scheduled vanc, pulse dose now.  Level ordered for am.   Dmitry Blackwell Pharm D 9/9/20211:00 PM  .

## 2021-09-09 NOTE — PROGRESS NOTES
Pulmonary & Critical Care Medicine ICU Progress Note    CC: Acute respiratory failure with hypoxemia, DKA    Events of Last 24 hours: patient with closure of AG. Invasive Lines:   IV Line: pivs    MV:  9/7  Vent Mode: AC/VC Rate Set: 30 bmp/Vt Ordered: 450 mL/ /FiO2 : 30 %  Recent Labs     09/08/21  0422 09/09/21  0750   PHART 7.223* 7.313*   RUU0YTU 33.0* 27.3*   PO2ART 65.5* 99.0       IV:   propofol 50 mcg/kg/min (09/09/21 0851)    dextrose 5 % and 0.45 % NaCl 150 mL/hr at 09/08/21 0727    insulin 2.07 Units/hr (09/09/21 0752)    dextrose 5 % and 0.45 % NaCl 150 mL/hr at 09/09/21 0901    midazolam 6 mg/hr (09/08/21 1947)       EXAM:  BP (!) 142/103   Pulse 98   Temp 99.1 °F (37.3 °C) (Bladder)   Resp (!) 34   Ht 5' 9\" (1.753 m) Comment: per past encounters  Wt 179 lb 3.7 oz (81.3 kg)   SpO2 100%   BMI 26.47 kg/m²  on vent 100.8F  Tmax:  CVP:      Intake/Output Summary (Last 24 hours) at 9/9/2021 0909  Last data filed at 9/9/2021 0759  Gross per 24 hour   Intake 6828.68 ml   Output 3825 ml   Net 3003.68 ml     Gen: No distress. NCAT  Eyes: PERRL. No sclera icterus. No conjunctival injection. ENT: No discharge. Pharynx clear. Neck: Trachea midline. No obvious mass. Resp: No accessory muscle use. R sided crackles. No wheezes. No rhonchi. No dullness on percussion. CV: Regular rate. Regular rhythm. No murmur or rub. No edema. Skin: Warm and dry. No nodule on exposed extremities. M/S: No cyanosis. No joint deformity. No clubbing.    Neuro: sedated, not moving extremities     Medications:   famotidine (PEPCID) injection  20 mg IntraVENous Daily    cefepime  1,000 mg IntraVENous Q8H    mupirocin   Nasal BID    azithromycin  500 mg IntraVENous Q24H    vancomycin  1,250 mg IntraVENous Q24H    chlorhexidine  15 mL Mouth/Throat BID    enoxaparin  40 mg SubCUTAneous Daily    carboxymethylcellulose PF  1 drop Both Eyes Q4H    And    artificial tears   Both Eyes Q4H     PRN Meds:  acetaminophen, ondansetron, dextrose, potassium chloride, magnesium sulfate, sodium phosphate IVPB **OR** sodium phosphate IVPB **OR** sodium phosphate IVPB, dextrose 5 % and 0.45 % NaCl, dextrose, potassium chloride, magnesium sulfate, polyethylene glycol, dextrose 5 % and 0.45 % NaCl, fentanNYL, midazolam    Results:  CBC:   Recent Labs     09/07/21  1607 09/08/21  0404 09/09/21  0503   WBC 31.6* 17.8* 7.8   HGB 14.0 13.4* 13.6   HCT 48.6 41.2 41.4   .5* 93.0 92.7    200 110*     BMP:   Recent Labs     09/08/21  2300 09/09/21  0221 09/09/21  0503   * 131* 134*   K 4.2 see below 3.5    110 108   CO2 12* 12* 12*   PHOS 2.6 3.1 3.6   BUN 20 19 20   CREATININE 2.5* 2.4* 2.6*     LIVER PROFILE:   Recent Labs     09/07/21  1601 09/07/21  1723 09/09/21  0503   AST 16 60* 24   ALT 13 17 14   BILIDIR  --   --  <0.2   BILITOT <0.2 0.3 <0.2   ALKPHOS 208* 208* 136*     PT/INR: No results for input(s): PROTIME, INR in the last 72 hours. APTT: No results for input(s): APTT in the last 72 hours. UA:No results for input(s): NITRITE, COLORU, PHUR, LABCAST, WBCUA, RBCUA, MUCUS, TRICHOMONAS, YEAST, BACTERIA, CLARITYU, SPECGRAV, LEUKOCYTESUR, UROBILINOGEN, BILIRUBINUR, BLOODU, GLUCOSEU, AMORPHOUS in the last 72 hours.     Invalid input(s): Camila Masters    Cultures:  9/7 blood- ngtd  9/8 sputum- ngtd  9/7 sars Cov2- neg     Films:  CXR 9/9: ETT and NG tube remain in satisfactory position.  There has been interval improvement in multifocal right-sided airspace opacification.  The left lung is clear.  Heart size and vascularity are stable.  There is no pneumothorax or effusion.      9/7 sars Cov2- neg      ASSESSMENT:  ·  Acute respiratory failure with hypoxemia  · RLL pneumonia - Healthcare associated pneumonia - probable gram negative, risk for methicillin resistant Staph aureus as well   · Severe diabetic ketoacidosis   · NOVA  · hyponatremia  · Elevated liver enzymes  · Acute metabolic encephalopathy  · Thrombocytopenia - rule out HIT        PLAN:  · Mechanical ventilation per my orders. The ventilator was adjusted by me at the bedside for unstable, life threatening respiratory failure. · HOB greater than 30 degrees at all times  · Daily SBT once FIO2<60% and PEEP = 5, patient arousable, hemodynamically stable  · IV Sedation with propofol and fentanyl gtt targeted RASS -2  · ABX: vanc and cefepime D3; D2 azithromycin  · F/u Blood, respiratory and urine cultures  · Start tube feeds per nutrition recs  · Transition to SQ insulin - lantus 20 U and HSSI  · ICU care- hold lovenox and bactroban  · HIT Ab  · Add hydralazine for HTN prn    Critical care time spent reviewing labs/films, examining patient, collaborating with other physicians but excluding procedures for life threatening organ failure is 32 minutes.

## 2021-09-09 NOTE — CARE COORDINATION
Pt remains in the ICU and is on a Vent. Message left for pt's wife requesting a call back to complete the assessment     Addendum at 1:55pm: No return call from pt's wife.  Another message left requesting a call back

## 2021-09-09 NOTE — PROGRESS NOTES
Lab attempted several times to draw labs without success. RN attempted to draw from IV's without success. Lab obtained from art stick w/out difficulty. Dr. Blas Castillo updated via perfect serve. Lab changed to daily.

## 2021-09-09 NOTE — PROGRESS NOTES
Dr. Gaye Felton on the unit for rounds. ABG results noted. Results for Liam Kim (MRN [id number]) as of 9/9/2021 16:22   Ref.  Range 9/9/2021 07:50   Hemoglobin, Art, Extended Latest Ref Range: 13.5 - 17.5 g/dL 12.6 (L)   pH, Arterial Latest Ref Range: 7.350 - 7.450  7.313 (L)   pCO2, Arterial Latest Ref Range: 35.0 - 45.0 mmHg 27.3 (L)   pO2, Arterial Latest Ref Range: 75.0 - 108.0 mmHg 99.0   HCO3, Arterial Latest Ref Range: 21.0 - 29.0 mmol/L 13.5 (L)   TCO2 (calc), Art Latest Ref Range: Not Established mmol/L 14.4   Base Excess, Arterial Latest Ref Range: -3.0 - 3.0 mmol/L -11.1 (L)   O2 Sat, Arterial Latest Ref Range: >92 % 97.1   O2 Content, Arterial Latest Ref Range: Not Established mL/dL 17   Methemoglobin, Arterial Latest Ref Range: <1.5 % 0.3   Carboxyhgb, Arterial Latest Ref Range: 0.0 - 1.5 % 0.3

## 2021-09-09 NOTE — PROGRESS NOTES
09/08/21 2028   Vent Information   $Ventilation $Subsequent Day   Skin Assessment Clean, dry, & intact   Equipment Changed HME   Vent Type 840   Vent Mode AC/VC   Vt Ordered 450 mL   Rate Set 30 bmp   Peak Flow 90 L/min   Pressure Support 0 cmH20   FiO2  30 %   SpO2 99 %   SpO2/FiO2 ratio 330   Sensitivity 3   PEEP/CPAP 5   I Time/ I Time % 0 s   Humidification Source HME   Vent Patient Data   High Peep/I Pressure 0   Peak Inspiratory Pressure 22 cmH2O   Mean Airway Pressure 9.8 cmH20   Rate Measured 30 br/min   Vt Exhaled 435 mL   Minute Volume 13.1 Liters   I:E Ratio 1:2.70   Plateau Pressure 15 XVA87   Static Compliance 44 mL/cmH2O   Dynamic Compliance 26 mL/cmH2O   Cough/Sputum   Sputum How Obtained Endotracheal;Suctioned   Cough Non-productive   Sputum Amount None   Sputum Color None   Tenacity None   Spontaneous Breathing Trial (SBT) RT Doc   Pulse 107   Breath Sounds   Right Upper Lobe Diminished   Right Middle Lobe Diminished   Right Lower Lobe Diminished   Left Upper Lobe Diminished   Left Lower Lobe Diminished   Additional Respiratory  Assessments   Resp 27   Position Semi-Vila's   Alarm Settings   High Pressure Alarm 40 cmH2O   Low Minute Volume Alarm 2.5 L/min   High Respiratory Rate 45 br/min   Patient Observation   Observations ETT SIZE 8.0, SECURED AT 24 LIP LINE. AMBU BAG AT HEAD OF BED. HME CHANGED.     Non-Surgical Airway 09/07/21 Endo Tracheal Tube   Placement Date/Time: 09/07/21 1659   Placed By: In ED  Inserted by: Dr Stahl Quiet  Airway Device: Endo Tracheal Tube  Size: 8   Secured at 24 cm   Measured From [facility] By [employer] tube anne   Site Condition Dry

## 2021-09-09 NOTE — PROGRESS NOTES
Internal Medicine ICU Progress Note      Events of Last 24 hours:     77-year-old white male who brought in the hospital with DKA. He has a history of diabetes mellitus type 1, juvenile rheumatoid arthritis, celiac disease, dermatomyositis, hypertension, hyperlipidemia, schizoaffective disorder. He was eve in for altered mental status. Work-up showed DKA. t his pH is 6.8 and he was intubated for severe respiratory distress and acidosis. He had a brief run of V. tach. He had a white cell count of 32,000 and hyperkalemia. He is admitted the ICU. This morning he is sedated on the ventilator. He had. Of agitation last night. He has a low-grade fever. -patient's DKA is resolved. Still requiring ventilatory support. Low-grade fever. Sedated on the vent. Only responds to pain. Invasive Lines: PIV x 3       MV: Intubated on 2021    Recent Labs     21  0422 21  0750   PHART 7.223* 7.313*   ETC0VXC 33.0* 27.3*   PO2ART 65.5* 99.0       MV Settings:  Vent Mode: AC/VC Rate Set: 24 bmp/Vt Ordered: 450 mL/ /FiO2 : 30 %    IV:   dextrose      propofol 50 mcg/kg/min (21 1555)    midazolam 6 mg/hr (21 1133)       Vitals:  Temp  Av.1 °F (37.3 °C)  Min: 98.1 °F (36.7 °C)  Max: 100 °F (37.8 °C)  Pulse  Av.6  Min: 88  Max: 121  BP  Min: 80/57  Max: 190/98  SpO2  Av.9 %  Min: 99 %  Max: 100 %  FiO2   Av %  Min: 30 %  Max: 30 %  Patient Vitals for the past 4 hrs:   BP Temp Temp src Pulse Resp SpO2   21 1600 (!) 190/98 100 °F (37.8 °C) Bladder 102 23 100 %   21 1500 (!) 187/105 -- -- 94 24 100 %   21 1400 (!) 140/98 -- -- 95 (!) 31 100 %       CVP:        Intake/Output Summary (Last 24 hours) at 2021 1701  Last data filed at 2021 1600  Gross per 24 hour   Intake 4341.99 ml   Output 4680 ml   Net -338.01 ml       EXAM:  General: Sick appearing. Sedated on the ventilator. Eyes: PERRL. No sclera icterus. No conjunctiva injected.   ENT: No discharge. Intubated  Neck: Trachea midline. Normal thyroid. Resp: No accessory muscle use. Right lower lobe crackles. No wheezing. No rhonchi. No dullness on percussion. CV: Regular rate. Regular rhythm. No mumur or rub. No edema. No JVD. Palpable pedal pulses. GI: Non-tender. Non-distended. No masses. No organmegaly. Normal bowel sounds. No hernia. Skin: Warm and dry. No nodule on exposed extremities. No rash on exposed extremities. Lymph: No cervical LAD. No supraclavicular LAD. M/S: No cyanosis. No joint deformity. No clubbing. Neuro: Intubated  Psych:  Intubated        Medications:  Scheduled Meds:   famotidine (PEPCID) injection  20 mg IntraVENous Daily    insulin lispro  0-18 Units SubCUTAneous Q4H    insulin glargine  20 Units SubCUTAneous QAM    vancomycin (VANCOCIN) intermittent dosing (placeholder)   Other RX Placeholder    cefepime  1,000 mg IntraVENous Q8H    mupirocin   Nasal BID    azithromycin  500 mg IntraVENous Q24H    chlorhexidine  15 mL Mouth/Throat BID    [Held by provider] enoxaparin  40 mg SubCUTAneous Daily    carboxymethylcellulose PF  1 drop Both Eyes Q4H    And    artificial tears   Both Eyes Q4H       PRN Meds:  glucose, dextrose, glucagon (rDNA), dextrose, hydrALAZINE, acetaminophen, ondansetron, dextrose, dextrose, polyethylene glycol, fentanNYL, midazolam    Results:  CBC:   Recent Labs     09/07/21  1607 09/08/21  0404 09/09/21  0503   WBC 31.6* 17.8* 7.8   HGB 14.0 13.4* 13.6   HCT 48.6 41.2 41.4   .5* 93.0 92.7    200 110*     BMP:   Recent Labs     09/09/21  0221 09/09/21  0503 09/09/21  1100   * 134* 135*   K see below 3.5 4.0    108 108   CO2 12* 12* 11*   PHOS 3.1 3.6 3.4   BUN 19 20 18   CREATININE 2.4* 2.6* 2.5*     LIVER PROFILE:   Recent Labs     09/07/21  1601 09/07/21  1723 09/09/21  0503   AST 16 60* 24   ALT 13 17 14   BILIDIR  --   --  <0.2   BILITOT <0.2 0.3 <0.2   ALKPHOS 208* 208* 136*     Cultures:  Results for Hold.    #History of dermatomyositis. #Thrombocytopenia. Significant drop in platelets. Hold Lovenox. HIT ordered. #Acute metabolic encephalopathy at the time of admission. Presently sedated on the ventilator. #Hold Lovenox due to drop in platelets. SCD for DVT prophylaxis. IMPORTANT: Please note that some portions of this note may have been created using Dragon voice recognition software. Some \"sound-alike\" and totally wrong word substitutions may have taken place due to known inherent limitations of any such software, including this voice recognition software. In spite of efforts to eliminate such errors, some may not have been corrected. So please read the note with this in mind and recognize such mistakes and understand the correct version using the  context. If there are still uncertainties in the mind of the medical provider reading this note about any aspect of the note, the provider can feel free to contact me. Thanks. All questions and concerns were addressed to the patient/family. Alternatives to my treatment were discussed. The note was completed using EMR. Every effort was made to ensure accuracy; however, inadvertent computerized transcription errors may be present.          Fadia Fontaine MD 5:01 PM 9/9/2021

## 2021-09-09 NOTE — PLAN OF CARE

## 2021-09-09 NOTE — PROGRESS NOTES
Patient incredibly hard stick per lab and nursing staff for Q4 BMPs and Q 6 lactics. Will pass off to dayshift RN about patient receiving a PICC consult?  Patient intubated and only has PIVs

## 2021-09-10 ENCOUNTER — APPOINTMENT (OUTPATIENT)
Dept: GENERAL RADIOLOGY | Age: 36
DRG: 207 | End: 2021-09-10
Payer: COMMERCIAL

## 2021-09-10 LAB
ALBUMIN SERPL-MCNC: 2.9 G/DL (ref 3.4–5)
ALP BLD-CCNC: 120 U/L (ref 40–129)
ALT SERPL-CCNC: 10 U/L (ref 10–40)
AMORPHOUS: ABNORMAL /HPF
ANION GAP SERPL CALCULATED.3IONS-SCNC: 12 MMOL/L (ref 3–16)
AST SERPL-CCNC: 10 U/L (ref 15–37)
BACTERIA: ABNORMAL /HPF
BASE EXCESS ARTERIAL: -9.7 MMOL/L (ref -3–3)
BASE EXCESS ARTERIAL: -9.8 MMOL/L (ref -3–3)
BASOPHILS ABSOLUTE: 0.1 K/UL (ref 0–0.2)
BASOPHILS RELATIVE PERCENT: 1.6 %
BILIRUB SERPL-MCNC: 0.4 MG/DL (ref 0–1)
BILIRUBIN DIRECT: <0.2 MG/DL (ref 0–0.3)
BILIRUBIN URINE: NEGATIVE
BILIRUBIN, INDIRECT: ABNORMAL MG/DL (ref 0–1)
BLOOD, URINE: NEGATIVE
BUN BLDV-MCNC: 24 MG/DL (ref 7–20)
CALCIUM SERPL-MCNC: 8.4 MG/DL (ref 8.3–10.6)
CARBOXYHEMOGLOBIN ARTERIAL: 0.1 % (ref 0–1.5)
CARBOXYHEMOGLOBIN ARTERIAL: 0.8 % (ref 0–1.5)
CHLORIDE BLD-SCNC: 111 MMOL/L (ref 99–110)
CHLORIDE URINE RANDOM: 75 MMOL/L
CLARITY: ABNORMAL
CO2: 14 MMOL/L (ref 21–32)
COLOR: YELLOW
CREAT SERPL-MCNC: 2.9 MG/DL (ref 0.9–1.3)
EOSINOPHILS ABSOLUTE: 0 K/UL (ref 0–0.6)
EOSINOPHILS RELATIVE PERCENT: 0.6 %
GFR AFRICAN AMERICAN: 30
GFR NON-AFRICAN AMERICAN: 25
GLUCOSE BLD-MCNC: 177 MG/DL (ref 70–99)
GLUCOSE BLD-MCNC: 211 MG/DL (ref 70–99)
GLUCOSE BLD-MCNC: 268 MG/DL (ref 70–99)
GLUCOSE BLD-MCNC: 269 MG/DL (ref 70–99)
GLUCOSE BLD-MCNC: 277 MG/DL (ref 70–99)
GLUCOSE BLD-MCNC: 289 MG/DL (ref 70–99)
GLUCOSE BLD-MCNC: 297 MG/DL (ref 70–99)
GLUCOSE BLD-MCNC: 315 MG/DL (ref 70–99)
GLUCOSE URINE: >=1000 MG/DL
HCO3 ARTERIAL: 14.3 MMOL/L (ref 21–29)
HCO3 ARTERIAL: 15.2 MMOL/L (ref 21–29)
HCT VFR BLD CALC: 33.1 % (ref 40.5–52.5)
HEMOGLOBIN, ART, EXTENDED: 11.6 G/DL (ref 13.5–17.5)
HEMOGLOBIN, ART, EXTENDED: 13.1 G/DL (ref 13.5–17.5)
HEMOGLOBIN: 11 G/DL (ref 13.5–17.5)
HEPARIN INDUCED PLATELET ANTIBODY: NEGATIVE
KETONES, URINE: NEGATIVE MG/DL
LACTIC ACID: 0.9 MMOL/L (ref 0.4–2)
LEUKOCYTE ESTERASE, URINE: NEGATIVE
LYMPHOCYTES ABSOLUTE: 0.7 K/UL (ref 1–5.1)
LYMPHOCYTES RELATIVE PERCENT: 9.6 %
MAGNESIUM: 2.1 MG/DL (ref 1.8–2.4)
MCH RBC QN AUTO: 30.2 PG (ref 26–34)
MCHC RBC AUTO-ENTMCNC: 33.2 G/DL (ref 31–36)
MCV RBC AUTO: 90.8 FL (ref 80–100)
METHEMOGLOBIN ARTERIAL: 0.3 %
METHEMOGLOBIN ARTERIAL: 0.3 %
MICROSCOPIC EXAMINATION: YES
MONOCYTES ABSOLUTE: 0.7 K/UL (ref 0–1.3)
MONOCYTES RELATIVE PERCENT: 10.6 %
MUCUS: ABNORMAL /LPF
NEUTROPHILS ABSOLUTE: 5.4 K/UL (ref 1.7–7.7)
NEUTROPHILS RELATIVE PERCENT: 77.6 %
NITRITE, URINE: NEGATIVE
O2 CONTENT ARTERIAL: 17 ML/DL
O2 SAT, ARTERIAL: 89.7 %
O2 SAT, ARTERIAL: 98.8 %
O2 THERAPY: ABNORMAL
O2 THERAPY: ABNORMAL
PCO2 ARTERIAL: 26.3 MMHG (ref 35–45)
PCO2 ARTERIAL: 30.5 MMHG (ref 35–45)
PDW BLD-RTO: 15 % (ref 12.4–15.4)
PERFORMED ON: ABNORMAL
PH ARTERIAL: 7.31 (ref 7.35–7.45)
PH ARTERIAL: 7.35 (ref 7.35–7.45)
PH UA: 5.5 (ref 5–8)
PHOSPHORUS: 3.5 MG/DL (ref 2.5–4.9)
PLATELET # BLD: 130 K/UL (ref 135–450)
PMV BLD AUTO: 8.5 FL (ref 5–10.5)
PO2 ARTERIAL: 144.1 MMHG (ref 75–108)
PO2 ARTERIAL: 60.9 MMHG (ref 75–108)
POTASSIUM SERPL-SCNC: 3.6 MMOL/L (ref 3.5–5.1)
POTASSIUM, UR: 24.1 MMOL/L
PROTEIN UA: ABNORMAL MG/DL
RBC # BLD: 3.65 M/UL (ref 4.2–5.9)
RBC UA: ABNORMAL /HPF (ref 0–4)
SODIUM BLD-SCNC: 137 MMOL/L (ref 136–145)
SODIUM URINE: 60 MMOL/L
SPECIFIC GRAVITY UA: 1.01 (ref 1–1.03)
TCO2 ARTERIAL: 15.1 MMOL/L
TCO2 ARTERIAL: 16.1 MMOL/L
TOTAL PROTEIN: 5.6 G/DL (ref 6.4–8.2)
TRIGL SERPL-MCNC: 255 MG/DL (ref 0–150)
URINE TYPE: ABNORMAL
UROBILINOGEN, URINE: 0.2 E.U./DL
VANCOMYCIN RANDOM: 17.4 UG/ML
WBC # BLD: 7 K/UL (ref 4–11)
WBC UA: ABNORMAL /HPF (ref 0–5)

## 2021-09-10 PROCEDURE — 2580000003 HC RX 258: Performed by: INTERNAL MEDICINE

## 2021-09-10 PROCEDURE — 84100 ASSAY OF PHOSPHORUS: CPT

## 2021-09-10 PROCEDURE — 83605 ASSAY OF LACTIC ACID: CPT

## 2021-09-10 PROCEDURE — 6360000002 HC RX W HCPCS: Performed by: INTERNAL MEDICINE

## 2021-09-10 PROCEDURE — 71045 X-RAY EXAM CHEST 1 VIEW: CPT

## 2021-09-10 PROCEDURE — 6370000000 HC RX 637 (ALT 250 FOR IP): Performed by: INTERNAL MEDICINE

## 2021-09-10 PROCEDURE — 82436 ASSAY OF URINE CHLORIDE: CPT

## 2021-09-10 PROCEDURE — 84133 ASSAY OF URINE POTASSIUM: CPT

## 2021-09-10 PROCEDURE — 80202 ASSAY OF VANCOMYCIN: CPT

## 2021-09-10 PROCEDURE — 85025 COMPLETE CBC W/AUTO DIFF WBC: CPT

## 2021-09-10 PROCEDURE — 84300 ASSAY OF URINE SODIUM: CPT

## 2021-09-10 PROCEDURE — 36600 WITHDRAWAL OF ARTERIAL BLOOD: CPT

## 2021-09-10 PROCEDURE — 99291 CRITICAL CARE FIRST HOUR: CPT | Performed by: INTERNAL MEDICINE

## 2021-09-10 PROCEDURE — 36415 COLL VENOUS BLD VENIPUNCTURE: CPT

## 2021-09-10 PROCEDURE — 81001 URINALYSIS AUTO W/SCOPE: CPT

## 2021-09-10 PROCEDURE — 74018 RADEX ABDOMEN 1 VIEW: CPT

## 2021-09-10 PROCEDURE — 82803 BLOOD GASES ANY COMBINATION: CPT

## 2021-09-10 PROCEDURE — 94761 N-INVAS EAR/PLS OXIMETRY MLT: CPT

## 2021-09-10 PROCEDURE — 94003 VENT MGMT INPAT SUBQ DAY: CPT

## 2021-09-10 PROCEDURE — 80076 HEPATIC FUNCTION PANEL: CPT

## 2021-09-10 PROCEDURE — 2700000000 HC OXYGEN THERAPY PER DAY

## 2021-09-10 PROCEDURE — 2500000003 HC RX 250 WO HCPCS: Performed by: INTERNAL MEDICINE

## 2021-09-10 PROCEDURE — 83735 ASSAY OF MAGNESIUM: CPT

## 2021-09-10 PROCEDURE — 84478 ASSAY OF TRIGLYCERIDES: CPT

## 2021-09-10 PROCEDURE — 80048 BASIC METABOLIC PNL TOTAL CA: CPT

## 2021-09-10 PROCEDURE — 2000000000 HC ICU R&B

## 2021-09-10 PROCEDURE — 99233 SBSQ HOSP IP/OBS HIGH 50: CPT | Performed by: INTERNAL MEDICINE

## 2021-09-10 RX ORDER — INSULIN GLARGINE 100 [IU]/ML
30 INJECTION, SOLUTION SUBCUTANEOUS ONCE
Status: COMPLETED | OUTPATIENT
Start: 2021-09-10 | End: 2021-09-10

## 2021-09-10 RX ORDER — TOPIRAMATE 25 MG/1
50 TABLET ORAL NIGHTLY
Status: DISCONTINUED | OUTPATIENT
Start: 2021-09-10 | End: 2021-09-14 | Stop reason: HOSPADM

## 2021-09-10 RX ORDER — INSULIN GLARGINE 100 [IU]/ML
50 INJECTION, SOLUTION SUBCUTANEOUS EVERY MORNING
Status: DISCONTINUED | OUTPATIENT
Start: 2021-09-11 | End: 2021-09-13

## 2021-09-10 RX ORDER — BUPROPION HYDROCHLORIDE 150 MG/1
150 TABLET ORAL EVERY MORNING
COMMUNITY

## 2021-09-10 RX ORDER — BUPROPION HYDROCHLORIDE 150 MG/1
450 TABLET ORAL DAILY
Status: DISCONTINUED | OUTPATIENT
Start: 2021-09-10 | End: 2021-09-14 | Stop reason: HOSPADM

## 2021-09-10 RX ADMIN — CARBOXYMETHYLCELLULOSE SODIUM 1 DROP: 10 GEL OPHTHALMIC at 12:17

## 2021-09-10 RX ADMIN — PROPOFOL 25 MCG/KG/MIN: 10 INJECTION, EMULSION INTRAVENOUS at 21:27

## 2021-09-10 RX ADMIN — PROPOFOL 50 MCG/KG/MIN: 10 INJECTION, EMULSION INTRAVENOUS at 08:08

## 2021-09-10 RX ADMIN — TOPIRAMATE 50 MG: 25 TABLET, FILM COATED ORAL at 19:43

## 2021-09-10 RX ADMIN — WHITE PETROLATUM 57.7 %-MINERAL OIL 31.9 % EYE OINTMENT: at 05:29

## 2021-09-10 RX ADMIN — CARBOXYMETHYLCELLULOSE SODIUM 1 DROP: 10 GEL OPHTHALMIC at 07:56

## 2021-09-10 RX ADMIN — PROPOFOL 25 MCG/KG/MIN: 10 INJECTION, EMULSION INTRAVENOUS at 14:41

## 2021-09-10 RX ADMIN — INSULIN GLARGINE 20 UNITS: 100 INJECTION, SOLUTION SUBCUTANEOUS at 09:41

## 2021-09-10 RX ADMIN — PROPOFOL 50 MCG/KG/MIN: 10 INJECTION, EMULSION INTRAVENOUS at 01:49

## 2021-09-10 RX ADMIN — WHITE PETROLATUM 57.7 %-MINERAL OIL 31.9 % EYE OINTMENT: at 01:53

## 2021-09-10 RX ADMIN — MIDAZOLAM HYDROCHLORIDE 2 MG: 1 INJECTION, SOLUTION INTRAMUSCULAR; INTRAVENOUS at 22:25

## 2021-09-10 RX ADMIN — CEFEPIME HYDROCHLORIDE 1000 MG: 1 INJECTION, POWDER, FOR SOLUTION INTRAMUSCULAR; INTRAVENOUS at 09:39

## 2021-09-10 RX ADMIN — HYDRALAZINE HYDROCHLORIDE 10 MG: 20 INJECTION INTRAMUSCULAR; INTRAVENOUS at 22:25

## 2021-09-10 RX ADMIN — Medication 15 ML: at 19:42

## 2021-09-10 RX ADMIN — FAMOTIDINE 20 MG: 10 INJECTION INTRAVENOUS at 08:48

## 2021-09-10 RX ADMIN — CEFEPIME HYDROCHLORIDE 1000 MG: 1 INJECTION, POWDER, FOR SOLUTION INTRAMUSCULAR; INTRAVENOUS at 17:19

## 2021-09-10 RX ADMIN — Medication 6 MG/HR: at 05:45

## 2021-09-10 RX ADMIN — INSULIN LISPRO 9 UNITS: 100 INJECTION, SOLUTION INTRAVENOUS; SUBCUTANEOUS at 08:02

## 2021-09-10 RX ADMIN — PROPOFOL 50 MCG/KG/MIN: 10 INJECTION, EMULSION INTRAVENOUS at 04:49

## 2021-09-10 RX ADMIN — CARBOXYMETHYLCELLULOSE SODIUM 1 DROP: 10 GEL OPHTHALMIC at 23:59

## 2021-09-10 RX ADMIN — INSULIN LISPRO 3 UNITS: 100 INJECTION, SOLUTION INTRAVENOUS; SUBCUTANEOUS at 23:59

## 2021-09-10 RX ADMIN — MUPIROCIN: 20 OINTMENT TOPICAL at 19:43

## 2021-09-10 RX ADMIN — CEFEPIME HYDROCHLORIDE 1000 MG: 1 INJECTION, POWDER, FOR SOLUTION INTRAMUSCULAR; INTRAVENOUS at 01:47

## 2021-09-10 RX ADMIN — INSULIN GLARGINE 30 UNITS: 100 INJECTION, SOLUTION SUBCUTANEOUS at 13:15

## 2021-09-10 RX ADMIN — INSULIN LISPRO 6 UNITS: 100 INJECTION, SOLUTION INTRAVENOUS; SUBCUTANEOUS at 19:57

## 2021-09-10 RX ADMIN — MIDAZOLAM HYDROCHLORIDE 2 MG: 1 INJECTION, SOLUTION INTRAMUSCULAR; INTRAVENOUS at 14:39

## 2021-09-10 RX ADMIN — INSULIN LISPRO 12 UNITS: 100 INJECTION, SOLUTION INTRAVENOUS; SUBCUTANEOUS at 04:30

## 2021-09-10 RX ADMIN — SODIUM BICARBONATE: 84 INJECTION, SOLUTION INTRAVENOUS at 20:25

## 2021-09-10 RX ADMIN — INSULIN LISPRO 9 UNITS: 100 INJECTION, SOLUTION INTRAVENOUS; SUBCUTANEOUS at 00:44

## 2021-09-10 RX ADMIN — CARBOXYMETHYLCELLULOSE SODIUM 1 DROP: 10 GEL OPHTHALMIC at 05:29

## 2021-09-10 RX ADMIN — CARIPRAZINE 6 MG: 3 CAPSULE, GELATIN COATED ORAL at 11:38

## 2021-09-10 RX ADMIN — CARBOXYMETHYLCELLULOSE SODIUM 1 DROP: 10 GEL OPHTHALMIC at 14:39

## 2021-09-10 RX ADMIN — CARBOXYMETHYLCELLULOSE SODIUM 1 DROP: 10 GEL OPHTHALMIC at 19:43

## 2021-09-10 RX ADMIN — MUPIROCIN: 20 OINTMENT TOPICAL at 08:48

## 2021-09-10 RX ADMIN — WHITE PETROLATUM 57.7 %-MINERAL OIL 31.9 % EYE OINTMENT: at 20:26

## 2021-09-10 RX ADMIN — WHITE PETROLATUM 57.7 %-MINERAL OIL 31.9 % EYE OINTMENT: at 17:20

## 2021-09-10 RX ADMIN — FENTANYL CITRATE 25 MCG: 0.05 INJECTION, SOLUTION INTRAMUSCULAR; INTRAVENOUS at 22:44

## 2021-09-10 RX ADMIN — Medication 15 ML: at 08:47

## 2021-09-10 RX ADMIN — INSULIN LISPRO 9 UNITS: 100 INJECTION, SOLUTION INTRAVENOUS; SUBCUTANEOUS at 13:29

## 2021-09-10 RX ADMIN — BUPROPION HYDROCHLORIDE 450 MG: 150 TABLET, EXTENDED RELEASE ORAL at 13:22

## 2021-09-10 RX ADMIN — CARBOXYMETHYLCELLULOSE SODIUM 1 DROP: 10 GEL OPHTHALMIC at 01:04

## 2021-09-10 RX ADMIN — WHITE PETROLATUM 57.7 %-MINERAL OIL 31.9 % EYE OINTMENT: at 14:39

## 2021-09-10 RX ADMIN — DEXTROSE MONOHYDRATE 500 MG: 50 INJECTION, SOLUTION INTRAVENOUS at 13:27

## 2021-09-10 RX ADMIN — Medication 3 MG/HR: at 14:42

## 2021-09-10 RX ADMIN — INSULIN LISPRO 9 UNITS: 100 INJECTION, SOLUTION INTRAVENOUS; SUBCUTANEOUS at 17:21

## 2021-09-10 RX ADMIN — SODIUM BICARBONATE: 84 INJECTION, SOLUTION INTRAVENOUS at 12:37

## 2021-09-10 RX ADMIN — WHITE PETROLATUM 57.7 %-MINERAL OIL 31.9 % EYE OINTMENT: at 09:42

## 2021-09-10 ASSESSMENT — PULMONARY FUNCTION TESTS
PIF_VALUE: 33
PIF_VALUE: 17
PIF_VALUE: 12
PIF_VALUE: 27
PIF_VALUE: 11
PIF_VALUE: 23
PIF_VALUE: 12
PIF_VALUE: 22
PIF_VALUE: 19

## 2021-09-10 ASSESSMENT — PAIN SCALES - GENERAL
PAINLEVEL_OUTOF10: 0
PAINLEVEL_OUTOF10: 7
PAINLEVEL_OUTOF10: 0
PAINLEVEL_OUTOF10: 0

## 2021-09-10 NOTE — PROGRESS NOTES
09/09/21 2046   Vent Information   $Ventilation $Subsequent Day   Skin Assessment Clean, dry, & intact   Equipment Changed HME   Vent Type 840   Vent Mode AC/VC   Vt Ordered 450 mL   Rate Set 24 bmp   Peak Flow 75 L/min   Pressure Support 0 cmH20   FiO2  30 %   SpO2 100 %   SpO2/FiO2 ratio 333.33   Sensitivity 3   PEEP/CPAP 5   I Time/ I Time % 0 s   Humidification Source HME   Vent Patient Data   High Peep/I Pressure 0   Peak Inspiratory Pressure 16 cmH2O   Mean Airway Pressure 5.2 cmH20   Rate Measured 28 br/min   Vt Exhaled 497 mL   Minute Volume 12.2 Liters   I:E Ratio 1:2.60   Plateau Pressure 15 SGF45   Static Compliance 50 mL/cmH2O   Dynamic Compliance 45 mL/cmH2O   Cough/Sputum   Sputum How Obtained Endotracheal;Suctioned   Cough Productive   Sputum Amount Large   Sputum Color Creamy; White   Tenacity Thick   Spontaneous Breathing Trial (SBT) RT Doc   Pulse 114   Breath Sounds   Right Upper Lobe Rhonchi   Right Middle Lobe Diminished   Right Lower Lobe Diminished   Left Upper Lobe Rhonchi   Left Lower Lobe Diminished   Additional Respiratory  Assessments   Resp 25   Position Semi-Vila's   Alarm Settings   High Pressure Alarm 40 cmH2O   Low Minute Volume Alarm 2.5 L/min   High Respiratory Rate 45 br/min   Patient Observation   Observations ETT SIZE 8.0, SECURED AT 24 LIP LINE. AMBU BAG AT HEAD OF BED. HME CHANGED.     Non-Surgical Airway 09/07/21 Endo Tracheal Tube   Placement Date/Time: 09/07/21 1659   Placed By: In ED  Inserted by: Dr Sheldon Mcnally  Airway Device: Endo Tracheal Tube  Size: 8   Secured at 24 cm   Measured From [address] [facility] By [employer] tube anne   Site Condition Dry

## 2021-09-10 NOTE — PROGRESS NOTES
Blood pressure (!) 151/81, pulse 100, temperature 99.1 °F (37.3 °C), temperature source Bladder, resp. rate 24, height 5' 9\" (1.753 m), weight 190 lb 9.6 oz (86.5 kg), SpO2 99 %. Patient currently intubated with ETT at 23LL. Vent settings are 24/450/+30%/5. Patient tolerating vent well. Increase of secretions continues from mouth and suctioning ETT. No other changes this shift.      ,Electronically signed by Chris Waters RN on 9/10/2021 at 5:36 AM

## 2021-09-10 NOTE — PROGRESS NOTES
Vancomycin Day 4  Pulse dosing  SCr this am up to 2.9  Random vanc level this am at 0433 = 17.4  No vanco dose today.   Will recheck random level and labs tomorrow am.  Mu Flores, [address] [facility]

## 2021-09-10 NOTE — PROGRESS NOTES
Blood pressure (!) 154/83, pulse 113, temperature 100.1 °F (37.8 °C), temperature source Bladder, resp. rate 27, height 5' 9\" (1.753 m), weight 179 lb 3.7 oz (81.3 kg), SpO2 100 %. Patient currently intubated with ETT at 23LL. Vent settings are 24/450/+30%/5. Patient is tolerating vent well at this time. Lots of secretions noted with suctioning. Lung sounds diminished. Patient is responding to pain but does not grasp handgrip or move extremities on command. No swelling noted. Abdomen soft non tender. BS + x 4 quads. Tube feed running at 45 ml/H through OG. Propofol infusing at 50 mcg/kg/min. Versed infusing at 6 mg/h. BUE soft wrist restraints in place due to attempts of pulling at ETT and lines. No signs of injury noted and PROM performed.      Electronically signed by Matias Zimmerman RN on 9/9/2021 at 8:28 PM

## 2021-09-10 NOTE — PROGRESS NOTES
Pulmonary & Critical Care Medicine ICU Progress Note    CC: Acute respiratory failure with hypoxemia, DKA    Events of Last 24 hours: patient with improving oxygenation. Brisk urinary output. Invasive Lines:   IV Line: pivs    MV:  9/7  Vent Mode: AC/VC Rate Set: 24 bmp/Vt Ordered: 450 mL/ /FiO2 : 30 %  Recent Labs     09/09/21  0750 09/10/21  0551   PHART 7.313* 7.352   LQW5LLA 27.3* 26.3*   PO2ART 99.0 144.1*       IV:   dextrose      propofol 50 mcg/kg/min (09/10/21 0449)    midazolam 6 mg/hr (09/10/21 0545)       EXAM:  BP (!) 159/84   Pulse 99   Temp 99.1 °F (37.3 °C) (Bladder)   Resp 27   Ht 5' 9\" (1.753 m) Comment: per past encounters  Wt 190 lb 9.6 oz (86.5 kg)   SpO2 100%   BMI 28.15 kg/m²  on vent 100.8F  Tmax:  CVP:      Intake/Output Summary (Last 24 hours) at 9/10/2021 0727  Last data filed at 9/10/2021 0537  Gross per 24 hour   Intake 3741.5 ml   Output 4530 ml   Net -788.5 ml     Gen: No distress. NCAT  Eyes: PERRL. No sclera icterus. No conjunctival injection. ENT: No discharge. Pharynx clear. Neck: Trachea midline. No obvious mass. Resp: No accessory muscle use. R sided crackles. No wheezes. No rhonchi. No dullness on percussion. CV: Regular rate. Regular rhythm. No murmur or rub. No edema. Skin: Warm and dry. No nodule on exposed extremities. M/S: No cyanosis. No joint deformity. No clubbing.    Neuro: sedated, not moving extremities     Medications:   famotidine (PEPCID) injection  20 mg IntraVENous Daily    insulin lispro  0-18 Units SubCUTAneous Q4H    insulin glargine  20 Units SubCUTAneous QAM    vancomycin (VANCOCIN) intermittent dosing (placeholder)   Other RX Placeholder    cefepime  1,000 mg IntraVENous Q8H    mupirocin   Nasal BID    azithromycin  500 mg IntraVENous Q24H    chlorhexidine  15 mL Mouth/Throat BID    [Held by provider] enoxaparin  40 mg SubCUTAneous Daily    carboxymethylcellulose PF  1 drop Both Eyes Q4H    And    artificial tears   Both Eyes Q4H     PRN Meds:  glucose, dextrose, glucagon (rDNA), dextrose, hydrALAZINE, acetaminophen, ondansetron, dextrose, dextrose, polyethylene glycol, fentanNYL, midazolam    Results:  CBC:   Recent Labs     09/08/21  0404 09/09/21  0503 09/10/21  0433   WBC 17.8* 7.8 7.0   HGB 13.4* 13.6 11.0*   HCT 41.2 41.4 33.1*   MCV 93.0 92.7 90.8    110* 130*     BMP:   Recent Labs     09/09/21  0503 09/09/21  1100 09/10/21  0433   * 135* 137   K 3.5 4.0 3.6    108 111*   CO2 12* 11* 14*   PHOS 3.6 3.4 3.5   BUN 20 18 24*   CREATININE 2.6* 2.5* 2.9*     LIVER PROFILE:   Recent Labs     09/07/21  1723 09/09/21  0503 09/10/21  0433   AST 60* 24 10*   ALT 17 14 10   BILIDIR  --  <0.2 <0.2   BILITOT 0.3 <0.2 0.4   ALKPHOS 208* 136* 120     PT/INR: No results for input(s): PROTIME, INR in the last 72 hours. APTT: No results for input(s): APTT in the last 72 hours. UA:No results for input(s): NITRITE, COLORU, PHUR, LABCAST, WBCUA, RBCUA, MUCUS, TRICHOMONAS, YEAST, BACTERIA, CLARITYU, SPECGRAV, LEUKOCYTESUR, UROBILINOGEN, BILIRUBINUR, BLOODU, GLUCOSEU, AMORPHOUS in the last 72 hours. Invalid input(s): Percilla Radar    Cultures:  9/7 blood- ngtd  9/8 sputum- ngtd  9/7 sars Cov2- neg     Films:  CXR 9/10: Tubes and lines are unchanged.  Mild patchy right basilar airspace disease is overall similar in appearance to the previous exam.      9/7 sars Cov2- neg      ASSESSMENT:  ·  Acute respiratory failure with hypoxemia  · RLL pneumonia - Healthcare associated pneumonia - probable gram negative, risk for methicillin resistant Staph aureus as well   · Severe diabetic ketoacidosis   · NOVA  · hyponatremia  · Elevated liver enzymes  · Acute metabolic encephalopathy  · Thrombocytopenia - rule out HIT  · hyperglycemia        PLAN:  · Mechanical ventilation per my orders. The ventilator was adjusted by me at the bedside for unstable, life threatening respiratory failure.    · HOB greater than 30 degrees at all times  · Daily SBT once FIO2<60% and PEEP = 5, patient arousable, hemodynamically stable  · IV Sedation with propofol and fentanyl gtt targeted RASS -2  · ABX: vanc and cefepime D4; D3 azithromycin  · tube feeds per nutrition recs  · Transition to SQ insulin - lantus increase to 50 U and HSSI  · ICU care- hold lovenox and bactroban  · HIT Ab pending  ·  hydralazine for HTN prn  · Nephrology consult- DD/w Dr. Tejinder Perry  · Re-start home psych meds Breylar and wellburin    Critical care time spent reviewing labs/films, examining patient, collaborating with other physicians but excluding procedures for life threatening organ failure is 32 minutes.

## 2021-09-10 NOTE — PROGRESS NOTES
Pt placed on SBT 5/5         09/10/21 1027   Vent Information   Vt Ordered 450 mL   Rate Set 0 bmp   Peak Flow 75 L/min   Pressure Support 5 cmH20   FiO2  30 %   SpO2 100 %   SpO2/FiO2 ratio 333.33   Sensitivity 3   PEEP/CPAP 5   I Time/ I Time % 0 s   Vent Patient Data   High Peep/I Pressure 0   Peak Inspiratory Pressure 12 cmH2O   Mean Airway Pressure 7.4 cmH20   Rate Measured 23 br/min   Vt Exhaled 525 mL   Minute Volume 12.8 Liters   I:E Ratio 1:2.30   Spontaneous Breathing Trial (SBT) RT Doc   Pulse 93   Additional Respiratory  Assessments   Resp 24   Alarm Settings   High Pressure Alarm 40 cmH2O   Low Minute Volume Alarm 2.5 L/min   High Respiratory Rate 45 br/min

## 2021-09-10 NOTE — PROGRESS NOTES
09/10/21 0316   Vent Information   Vent Type 840   Vent Mode AC/VC   Vt Ordered 450 mL   Rate Set 24 bmp   Peak Flow 75 L/min   Pressure Support 0 cmH20   FiO2  30 %   SpO2 99 %   SpO2/FiO2 ratio 330   Sensitivity 3   PEEP/CPAP 5   I Time/ I Time % 0 s   Vent Patient Data   High Peep/I Pressure 0   Peak Inspiratory Pressure 19 cmH2O   Mean Airway Pressure 9.4 cmH20   Rate Measured 26 br/min   Vt Exhaled 8 mL   Minute Volume 9.81 Liters   I:E Ratio 2.70:1   Cough/Sputum   Sputum How Obtained Suctioned;Endotracheal   Sputum Amount None   Spontaneous Breathing Trial (SBT) RT Doc   Pulse 101   Breath Sounds   Right Upper Lobe Clear   Right Middle Lobe Clear   Right Lower Lobe Clear   Left Upper Lobe Clear   Left Lower Lobe Clear   Additional Respiratory  Assessments   Resp 23   Position Semi-Vila's   Alarm Settings   High Pressure Alarm 40 cmH2O   Low Minute Volume Alarm 2.5 L/min   High Respiratory Rate 45 br/min

## 2021-09-10 NOTE — PROGRESS NOTES
Comprehensive Nutrition Assessment    Type and Reason for Visit:  Reassess    Nutrition Recommendations/Plan:   1. Continue ADULT TUBE FEEDING; Orogastric; Peptide-Based High-Protein formula - Vital High-Protein with a goal rate of 55 ml/hr x 20 hours. Increase current rate by 15 ml every 4 hours, as tolerated by patient, until goal rate can be achieved and maintained. Water flushes, 30 ml every 4 hours or per MD guidance. 2. Monitor TF rate, intake, and tolerance + water flushes. 3. Monitor vent status, sedation type/amount (propofol is at 25 mcg x 24 hours which = 341 kcals from lipids), TG checks (255 mg/dl on 9/10/21), and plan of care. 4. Monitor nutrition-related labs, bowel function, and weight trends. Nutrition Assessment:  patient has improved from a nutritional standpoint AEB TF was started and is infusing at 35 ml/hr currently (per flow sheet data) and patient is tolerating well, however, he remains at risk for further compromise d/t altered nutrition-related labs (blood glucose is elevated), intubation status, and need for EN as sole source of nutrition while intubated; will continue Vital High-Protein with goal rate of 55 ml/hr x 20 hours + 30 ml water flushes every 4 hours or per MD guidance    Malnutrition Assessment:  Malnutrition Status: At risk for malnutrition   Context:  Chronic Illness     Findings of the 6 clinical characteristics of malnutrition:  Energy Intake:  Unable to assess  Weight Loss:  7 - Greater than 10% over 6 months (- 31# or 14.8% weight loss x 5-6 months (since 3/31/21))     Body Fat Loss:  No significant body fat loss     Muscle Mass Loss:  No significant muscle mass loss    Fluid Accumulation:  No significant fluid accumulation     Strength:  Not Performed    Estimated Daily Nutrient Needs:  Energy (kcal):  1620 - 1863 kcals based on 20-23 kcals/kg/CBW;  Weight Used for Energy Requirements:  Current     Protein (g):  88 - 95 g protein based on 1.2-1.3 g/kg/IBW; Weight Used for Protein Requirements:  Ideal        Fluid (ml/day):  1620 - 1863 ml; Method Used for Fluid Requirements:  1 ml/kcal      Nutrition Related Findings:  patient remains intubated and sedated on 25 mcg propofol at this time; abdomen is soft and bowel sounds are active; no BM during admission; blood glucose elevated today (289, 268, 297, 315 mg/dl); Cl and BUN/Cr elevated; GFR = 25; patient has wellbutrin, vraylar, pepcid, 50 units Lantus each morning, high-dose SSI, versed in D5, and Na Bicarb 75 mEq in 1/2 NS at 150 ml/hr ordered at this time      Wounds:  None       Current Nutrition Therapies:    Current Tube Feeding (TF) Orders:  · Feeding Route: Orogastric  · Formula: Peptide Based High Protein  · Schedule: Continuous  · Additives/Modulars:  (none)  · Water Flushes: 30 ml every 4 hours or per MD guidance  · Current TF & Flush Orders Provides: Vital High-Protein at 35 ml/hr x 20 hours = 700 ml TV, 700 kcals, 61 g protein, and 585 ml free water + 30 ml water flushes every 4 hours or per MD guidance  · Goal TF & Flush Orders Provides: Vital High-Protein with a goal rate of 55 ml/hr x 20 hours = 1100 ml TV, 1100 kcals, 96 g protein, and 920 ml free water + 30 ml water flushes every 4 hours or per MD guidance      Anthropometric Measures:  · Height: 5' 9\" (175.3 cm)  · Current Body Weight: 190 lb 9.6 oz (86.5 kg) (obtained on 9/10/21)   · Admission Body Weight: 179 lb 3.7 oz (81.3 kg) (obtained on 9/8/21; actual weight)    · Usual Body Weight: 210 lb 6.4 oz (95.4 kg) (obtained on 3/31/21; actual weight)     · Ideal Body Weight: 160 lbs; % Ideal Body Weight 119.1 %   · BMI: 28.1  · BMI Categories: Overweight (BMI 25.0-29. 9)       Nutrition Diagnosis:   · Inadequate oral intake related to impaired respiratory function, inadequate protein-energy intake, endocrine dysfuntion as evidenced by NPO or clear liquid status due to medical condition, intubation, lab values, weight loss greater than or

## 2021-09-10 NOTE — PROGRESS NOTES
Blood pressure (!) 189/97, pulse 89, temperature 100.1 °F (37.8 °C), temperature source Bladder, resp. rate 24, height 5' 9\" (1.753 m), weight 190 lb 9.6 oz (86.5 kg), SpO2 100 %. Patient currently intubated with ETT at 23LL. Vent settings are 24/450/+35%/5. Patient is tolerating vent well. Increase secretions continue with a foul odor. Lung sounds clear. Abdomen soft BS + x 4 quads. Awaiting x -ray to verify OG placement then TF and medications will start. No swelling noted. Marie catheter intact draining yellow clear urine. BUE soft wrist restraints in place due to attempts of pulling at ETT and lines. No signs of injury noted and PROM performed. Propofol infusing at 25 mcg/kg/min. Versed infusing at 3 mg/h.   Bicarb gtt infusing at 150 ml/H    Electronically signed by Rosita Mchugh RN on 9/10/2021 at 7:54 PM

## 2021-09-10 NOTE — PROGRESS NOTES
09/09/21 2352   Vent Information   Vent Type 840   Vent Mode AC/VC   Vt Ordered 450 mL   Rate Set 24 bmp   Peak Flow 75 L/min   Pressure Support 0 cmH20   FiO2  30 %   SpO2 99 %   SpO2/FiO2 ratio 330   Sensitivity 3   PEEP/CPAP 5   I Time/ I Time % 0 s   Humidification Source HME   Vent Patient Data   High Peep/I Pressure 0   Peak Inspiratory Pressure 13 cmH2O   Mean Airway Pressure 4.2 cmH20   Rate Measured 26 br/min   Vt Exhaled 398 mL   Minute Volume 11.3 Liters   I:E Ratio 1:2.40   Cough/Sputum   Sputum How Obtained Endotracheal;Suctioned   Cough Productive   Sputum Amount Moderate   Sputum Color Creamy; White   Tenacity Thick   Spontaneous Breathing Trial (SBT) RT Doc   Pulse 118   Breath Sounds   Right Upper Lobe Rhonchi   Right Middle Lobe Diminished   Right Lower Lobe Diminished   Left Upper Lobe Rhonchi   Left Lower Lobe Diminished   Additional Respiratory  Assessments   Resp 22   Position Semi-Vila's   Alarm Settings   High Pressure Alarm 40 cmH2O   Low Minute Volume Alarm 2.5 L/min   High Respiratory Rate 45 br/min   Patient Observation   Observations ETT SIZE 8.0, SECURED AT 24 LIP LINE. AMBU BAG AT HEAD OF BED.      Non-Surgical Airway 09/07/21 Endo Tracheal Tube   Placement Date/Time: 09/07/21 1659   Placed By: In ED  Inserted by: Dr Lincoln Garnica  Airway Device: Endo Tracheal Tube  Size: 8   Secured at 24 cm   Measured From Lips   Secured Location Right   Secured By Commercial tube anne   Site Condition Dry

## 2021-09-10 NOTE — CONSULTS
[facility] Consult Note           Reason for Consult:  Acute kidney injury  Requesting Physician:  Dr. Andry Azevedo    Chief Complaint:  Altered mental status  History Obtained From:  patient, electronic medical record    History of Present Ilness:    38year old male with DM1, juvenile RA, celiac disease admitted with altered mental status. We have been asked to assist in further mgmt of his NOVA. On presentation, required mechanical ventilation for severe respiratory distress, acidosis, & altered mental status. Had brief run of Vtach, given amp of bicarb. Found to be in DKA with BS of ~800, K as high as 6.5 - better with insulin, IVF's. SCr 1.4 on admission, trending up to 2.9 today, urine output of 4.5 liters over last 24 hours. SCr previously 0.8 from June 2021. Received ~3.5 liters of IVF boluses on admission. Remains on ventilator, sedated, +low grade fevers, intake reduced.     Past Medical History:        Diagnosis Date    Anesthesia complication     BLOOD PRESSURE DROP IN OR DURING THR    Celiac disease     Dermatomyositis ([address]     Last attack 2009    Diabetes mellitus ([address]     TYPE I    Fibromyalgia     Hyperlipidemia     Hypertension     Juvenile rheumatoid arthritis ([address]     Movement disorder     DERMATOMYOSITIS/IN REMISSION    Rheumatoid arthritis(714.0)     Vertebral basilar insufficiency     symptoms related to JRA in cervical area    Wears glasses        Past Surgical History:        Procedure Laterality Date    HIP SURGERY      bilateral    JOINT REPLACEMENT Bilateral     THR    JOINT REPLACEMENT Left 6/3/2016    Revision left lateral total hip replacement    KNEE ARTHROSCOPY Right     KNEE ARTHROSCOPY Left 9/25/2013    medial & lateral Meniscectomy    LIVER BIOPSY      MUSCLE BIOPSY      TYMPANOSTOMY TUBE PLACEMENT      WRIST SURGERY Right     CYST       Home Medications:    No current facility-administered medications on file prior to encounter. Current Outpatient Medications on File Prior to Encounter   Medication Sig Dispense Refill    rosuvastatin (CRESTOR) 10 MG tablet Take 10 mg by mouth daily      buPROPion (WELLBUTRIN XL) 300 MG extended release tablet Take 300 mg by mouth every morning      diclofenac (VOLTAREN) 75 MG EC tablet Take 75 mg by mouth 2 times daily      insulin aspart (NOVOLOG FLEXPEN) 100 UNIT/ML injection pen INJECT BEFORE MEALS AS DIRECTED. UP TO 90 UNITS EVERY DAY      VRAYLAR 6 MG CAPS capsule       topiramate (TOPAMAX) 50 MG tablet TAKE 1 TABLET BY MOUTH EVERYDAY AT BEDTIME      ondansetron (ZOFRAN) 4 MG tablet Take 1 tablet by mouth every 8 hours as needed for Nausea 20 tablet 0    tiZANidine (ZANAFLEX) 4 MG tablet Take 4 mg by mouth every 6 hours as needed      buPROPion (WELLBUTRIN SR) 100 MG SR tablet Take 150 mg by mouth daily       insulin detemir (LEVEMIR) 100 UNIT/ML injection vial Inject 44 Units into the skin 2 times daily       Pantoprazole Sodium (PROTONIX) 40 MG PACK packet Take 40 mg by mouth daily.  pregabalin (LYRICA) 150 MG capsule Take 300 mg by mouth 2 times daily.  lisinopril (PRINIVIL;ZESTRIL) 10 MG tablet Take 30 mg by mouth daily.  hydroxychloroquine (PLAQUENIL) 200 MG tablet Take 200 mg by mouth 2 times daily.          Allergies:  Aspirin    Social History:    Social History     Socioeconomic History    Marital status:      Spouse name: Not on file    Number of children: Not on file    Years of education: Not on file    Highest education level: Not on file   Occupational History    Occupation:    Tobacco Use    Smoking status: Never Smoker    Smokeless tobacco: Never Used   Vaping Use    Vaping Use: Never used   Substance and Sexual Activity    Alcohol use: Yes     Comment: occasionally    Drug use: No    Sexual activity: Yes     Partners: Female   Other Topics Concern    Not on file   Social History Narrative    Not on file Social Determinants of Health     Financial Resource Strain:     Difficulty of Paying Living Expenses:    Food Insecurity:     Worried About Running Out of Food in the Last Year:     [address] in the Last Year:    Transportation Needs:     Lack of Transportation (Medical):  Lack of Transportation (Non-Medical):    Physical Activity:     Days of Exercise per Week:     Minutes of Exercise per Session:    Stress:     Feeling of Stress :    Social Connections:     Frequency of Communication with Friends and Family:     Frequency of Social Gatherings with Friends and Family:     Attends Nondenominational Services:     Active Member of Clubs or Organizations:     Attends Club or Organization Meetings:     Marital Status:    Intimate Partner Violence:     Fear of Current or Ex-Partner:     Emotionally Abused:     Physically Abused:     Sexually Abused:        Family History:   Family History   Problem Relation Age of Onset    High Blood Pressure Mother     Diabetes Father     Heart Disease Maternal Grandmother        Review of Systems:   Unable to obtain as patient intubated, sedated.     Physical exam:   Constitutional:  VITALS:  BP (!) 184/89   Pulse 103   Temp 99.3 °F (37.4 °C) (Bladder)   Resp 28   Ht 5' 9\" (1.753 m) Comment: per past encounters  Wt 190 lb 9.6 oz (86.5 kg)   SpO2 100%   BMI 28.15 kg/m²   Gen: intubated, sedated  Skin: no rash, turgor wnl  Heent:  Eyes, closed, sedated  Neck: no bruits or jvd noted, thyroid normal  Cardiovascular:  S1, S2 without m/r/g  Respiratory: CTA B without w/r/r; respiratory effort normal  Abdomen:  +bs, soft, nt, nd, no hepatosplenomegaly  Ext: no lower extremity edema  Psychiatric: mood and affect limited; judgement and insight limited  Musculoskeletal:  Rom, muscular strength limited; digits, nails normal    Data/  CBC:   Lab Results   Component Value Date    WBC 7.0 09/10/2021    RBC 3.65 09/10/2021    HGB 11.0 09/10/2021    HCT 33.1 09/10/2021 MCV 90.8 09/10/2021    MCH 30.2 09/10/2021    MCHC 33.2 09/10/2021    RDW 15.0 09/10/2021     09/10/2021    MPV 8.5 09/10/2021     BMP:    Lab Results   Component Value Date     09/10/2021    K 3.6 09/10/2021    K 5.2 09/07/2021     09/10/2021    CO2 14 09/10/2021    BUN 24 09/10/2021    LABALBU 2.9 09/10/2021    CREATININE 2.9 09/10/2021    CALCIUM 8.4 09/10/2021    GFRAA 30 09/10/2021    GFRAA >60 10/17/2011    LABGLOM 25 09/10/2021    GLUCOSE 315 09/10/2021         Assessment/    - Acute kidney injury - pre-renal/ATN injury in setting of DKA + PNA     - Anion-gap metabolic acidosis in setting of DKA, elevated lactate    - Hyponatremia/Hyperkalemia - secondary to translocation, better with insulin    - Acute hypoxic respiratory failure/PNA - on ventilator    - Acute metabolic encephalopathy      Plan/    - Start IVF's with bicarb replacement at 150 ml/hour  - No anticipated dialysis needs given adequate urine output  - Home doses of lisinopril and voltaren on hold  - Monitor vancomycin levels with dosing  - UA with microscopy, urine electrolytes  - Trend labs, bp's, & urine output    Case d/w ICU team    Thank you for the consultation. Please do not hesitate to call with questions. ____________________________________  Tom Salinas MD  [facility]  [email or web address]  Office: [phone]

## 2021-09-10 NOTE — PLAN OF CARE
Nutrition Problem #1: Inadequate oral intake  Intervention: Food and/or Nutrient Delivery: Continue NPO, Continue Current Tube Feeding  Nutritional Goals: patient will tolerate Vital High-Protein at goal rate of 55 ml/hr x 20 hours without GI distress, without s/s of aspiration, and without additional lab/fluid disturbances

## 2021-09-10 NOTE — PROGRESS NOTES
Internal Medicine ICU Progress Note      Events of Last 24 hours:     58-year-old white male who brought in the hospital with DKA. He has a history of diabetes mellitus type 1, juvenile rheumatoid arthritis, celiac disease, dermatomyositis, hypertension, hyperlipidemia, schizoaffective disorder. He was eve in for altered mental status. Work-up showed DKA. t his pH is 6.8 and he was intubated for severe respiratory distress and acidosis. He had a brief run of V. tach. He had a white cell count of 32,000 and hyperkalemia. He is admitted the ICU. This morning he is sedated on the ventilator. He had. Of agitation last night. He has a low-grade fever. -patient's DKA is resolved. Still requiring ventilatory support. Low-grade fever. Sedated on the vent. Only responds to pain. 9/10-sedation has been decreased. Oxygenation is good. Urine output is great. Creatinine is increased.   Plans for spontaneous breathing trial.      Invasive Lines: PIV x 3       MV: Intubated on 2021    Recent Labs     09/10/21  0551 09/10/21  1214   PHART 7.352 7.314*   [id number] 26.3* 30.5*   PO2ART 144.1* 60.9*       MV Settings:  Vent Mode: PS Rate Set: 0 bmp/Vt Ordered: 514 mL/ /FiO2 : 35 %    IV:   IV infusion builder 150 mL/hr at 09/10/21 1237    dextrose      propofol 25 mcg/kg/min (09/10/21 1441)    midazolam 3 mg/hr (09/10/21 1442)       Vitals:  Temp  Av.6 °F (37.6 °C)  Min: 98.9 °F (37.2 °C)  Max: 100.1 °F (37.8 °C)  Pulse  Av.6  Min: 92  Max: 134  BP  Min: 142/81  Max: 193/105  SpO2  Av.7 %  Min: 99 %  Max: 100 %  FiO2   Av.5 %  Min: 30 %  Max: 35 %  Patient Vitals for the past 4 hrs:   BP Temp Temp src Pulse Resp SpO2   09/10/21 1400 (!) 165/97 -- -- 118 23 100 %   09/10/21 1300 (!) 193/105 -- -- 119 25 100 %   09/10/21 1213 -- -- -- 105 (!) 31 100 %   09/10/21 1200 (!) 178/98 -- -- 105 28 100 %   09/10/21 1100 (!) 184/89 99.3 °F (37.4 °C) Bladder 103 28 100 %       CVP: Intake/Output Summary (Last 24 hours) at 9/10/2021 1454  Last data filed at 9/10/2021 1000  Gross per 24 hour   Intake 1966 ml   Output 2990 ml   Net -1024 ml       EXAM:  General: Sick appearing. Sedated on the ventilator. Eyes: PERRL. No sclera icterus. No conjunctiva injected. ENT: No discharge. Intubated  Neck: Trachea midline. Normal thyroid. Resp: No accessory muscle use. Right lower lobe crackles. No wheezing. No rhonchi. No dullness on percussion. CV: Regular rate. Regular rhythm. No mumur or rub. No edema. No JVD. Palpable pedal pulses. GI: Non-tender. Non-distended. No masses. No organmegaly. Normal bowel sounds. No hernia. Skin: Warm and dry. No nodule on exposed extremities. No rash on exposed extremities. Lymph: No cervical LAD. No supraclavicular LAD. M/S: No cyanosis. No joint deformity. No clubbing. Neuro: Intubated  Psych:  Intubated        Medications:  Scheduled Meds:   [START ON 9/11/2021] insulin glargine  50 Units SubCUTAneous QAM    cariprazine hcl  6 mg Oral Daily    buPROPion  450 mg Oral Daily    topiramate  50 mg Oral Nightly    famotidine (PEPCID) injection  20 mg IntraVENous Daily    insulin lispro  0-18 Units SubCUTAneous Q4H    vancomycin (VANCOCIN) intermittent dosing (placeholder)   Other RX Placeholder    cefepime  1,000 mg IntraVENous Q8H    mupirocin   Nasal BID    azithromycin  500 mg IntraVENous Q24H    chlorhexidine  15 mL Mouth/Throat BID    [Held by provider] enoxaparin  40 mg SubCUTAneous Daily    carboxymethylcellulose PF  1 drop Both Eyes Q4H    And    artificial tears   Both Eyes Q4H       PRN Meds:  glucose, dextrose, glucagon (rDNA), dextrose, hydrALAZINE, acetaminophen, ondansetron, dextrose, dextrose, polyethylene glycol, fentanNYL, midazolam    Results:  CBC:   Recent Labs     09/08/21  0404 09/09/21  0503 09/10/21  0433   WBC 17.8* 7.8 7.0   HGB 13.4* 13.6 11.0*   HCT 41.2 41.4 33.1*   MCV 93.0 92.7 90.8    110* 130* BMP:   Recent Labs     09/09/21  0503 09/09/21  1100 09/10/21  0433   * 135* 137   K 3.5 4.0 3.6    108 111*   CO2 12* 11* 14*   PHOS 3.6 3.4 3.5   BUN 20 18 24*   CREATININE 2.6* 2.5* 2.9*     LIVER PROFILE:   Recent Labs     09/07/21  1723 09/09/21  0503 09/10/21  0433   AST 60* 24 10*   ALT 17 14 10   BILIDIR  --  <0.2 <0.2   BILITOT 0.3 <0.2 0.4   ALKPHOS 208* 136* 120     Cultures:  Results for Sridhar June (MRN [id number]) as of 9/8/2021 14:27   Ref. Range 9/7/2021 17:17   INFLUENZA A Latest Ref Range: NOT DETECTED  NOT DETECTED   INFLUENZA B Latest Ref Range: NOT DETECTED  NOT DETECTED   SARS-CoV-2 RNA, RT PCR Latest Ref Range: NOT DETECTED  NOT DETECTED     Heparin Induced Plt Ab Negative       Films:    XR CHEST PORTABLE   Final Result   No significant change         XR CHEST PORTABLE   Final Result   Improving but incompletely resolved multifocal right-sided pneumonia. XR CHEST PORTABLE   Final Result   Stable right-sided pulmonary infiltrate. Satisfactory position of endotracheal tube         XR CHEST PORTABLE   Final Result   1. Developing multifocal right-sided pneumonia. 2. NG tube placement as above. Advancement by 10 cm recommended. XR CHEST PORTABLE   Final Result   Negative low lung volume portable chest                Assessment:    Principal Problem:    DKA, type 1, not at goal [facility])  Active Problems:    HTN (hypertension)    Hyperlipidemia    Schizoaffective disorder (HCC)    NOVA (acute kidney injury) ([facility] [address]    HCAP (healthcare-associated pneumonia)    Acute respiratory failure with hypoxia (HCC)    Acute metabolic encephalopathy    Thrombocytopenia (HCC)  Resolved Problems:    * No resolved hospital problems. *           Plan:    #DKA with type 1 diabetes. Admitted to hospital with DKA. Started on IV insulin. DKA protocol. His anion gap is closed. He is off his insulin drip. Started Lantus and sliding scale insulin.   Monitor and replace electrolytes as needed. Tube feeds started. #Acute hypoxemic respiratory failure. Intubated for respiratory distress. Seen by pulmonologist.  Continue mechanical ventilation. Dose of sedation has been cut back. Spontaneous breathing trial.  COVID-19 negative. #NOVA. Creatinine is worse. Urine output is good. Nephrology consulted. No plans for dialysis as good urine output. #Right lower lobe pneumonia. Risk for gram-negative and MRSA pneumonia. On IV cefepime and Vanco day 4. He is on IV Zithromax day #3 cultures sent. #Hypertension. On lisinopril at home. Hold because of NOVA. Started on as needed hydralazine    #Juvenile rheumatoid arthritis. On Plaquenil. Hold. #History of dermatomyositis. #Thrombocytopenia. Significant drop in platelets. Hold Lovenox. HIT ordered. -HIT antibody negative    #Acute metabolic encephalopathy at the time of admission. Presently sedated on the ventilator. #Hold Lovenox due to drop in platelets. SCD for DVT prophylaxis. #Pulmonologist resume home psychiatric medications. He is on Wellbutrin and Breylar    IMPORTANT: Please note that some portions of this note may have been created using Dragon voice recognition software. Some \"sound-alike\" and totally wrong word substitutions may have taken place due to known inherent limitations of any such software, including this voice recognition software. In spite of efforts to eliminate such errors, some may not have been corrected. So please read the note with this in mind and recognize such mistakes and understand the correct version using the  context. If there are still uncertainties in the mind of the medical provider reading this note about any aspect of the note, the provider can feel free to contact me. Thanks. All questions and concerns were addressed to the patient/family. Alternatives to my treatment were discussed. The note was completed using EMR.  Every effort was made to ensure accuracy; however, inadvertent computerized transcription errors may be present.          Lady Michael MD 2:54 PM 9/10/2021

## 2021-09-10 NOTE — CARE COORDINATION
Case Management Assessment  Initial Evaluation      Patient Name: Hailey Garzon  YOB: 1985  Diagnosis: DKA, type 1, not at goal [facility]) [E10.10]  Diabetic ketoacidosis without coma associated with type 1 diabetes mellitus ([facility] [address] [E10.10]  Date / Time: 9/7/2021  3:48 PM    Admission status/Date:INPT 9/7/21  Chart Reviewed: Yes      Patient Interviewed: No   Family Interviewed:  Yes - pt wife, [address] [facility] ([facility])      Hospitalization in the last 30 days:  No      Health Care Decision Maker :   Primary Decision Maker: [employer] - [phone]    (CM - must 1st enter selection under Navigator - emergency contact- Health Care Decision Maker Relationship and pick relationship)   Who do you trust or have selected to make healthcare decisions for you      Met with: pt wife, [address] [facility] ([facility]), via telephone  Interview conducted  (bedside/phone):    Current PCP: Kayleigh Donato required for SNF : Y, N          3 night stay required - [employer]  Support Systems/Care Needs:    Transportation: family    Meal Preparation: self    Housing  Living Arrangements: home with family  Steps: 5  Intent for return to present living arrangements: Yes  Identified Issues: -    Home Care Information  Active with [address] : No Agency:(Services)  Type of Home Care Services: None  Passport/Waiver : No  :                      Phone Number:    Passport/Waiver Services: -          Durable Medical Equiptment   DME Provider: n/a  Equipment: n/a  Walker___Cane___RTS___ BSC___Shower Chair___Hospital Bed___W/C____Other________  02 at ____Liter(s)---wears(frequency)_______ HHN ___ CPAP___ BiPap___   N/A____      Home O2 Use :  No    If No for home O2---if presently on O2 during hospitalization:  Yes  if yes CM to follow for potential DC O2 need  Informed of need for care provider to bring portable home O2 tank on day of discharge for nursing to connect prior to leaving:   Not Indicated  Verbalized agreement/Understanding:   Not Indicated    Community Service Affiliation  Dialysis:  No    · Agency:  · Location:  · Dialysis Schedule:  · Phone:   · Fax: Other Community Services: (ex:PT/OT,Mental Health,Wound Clinic, Cardio/Pul [address])    DISCHARGE PLAN: Explained Case Management role/services. Reviewed chart. Pt cont in ICU on vent. CM spoke with pt wife, Lucila Ramirez, via telephone for initial CM assessment. Wife states that pt is Irine Sleeper and lives at home with family. Ami states that pt will return home at discharge and denies needs at this time. Lucila Ramirez states that pt is compliant with diabetic meds/diet and has no issues with insurance coverage for medications. CM will cont to follow for any potential discharge needs.

## 2021-09-11 ENCOUNTER — APPOINTMENT (OUTPATIENT)
Dept: GENERAL RADIOLOGY | Age: 36
DRG: 207 | End: 2021-09-11
Payer: COMMERCIAL

## 2021-09-11 LAB
ALBUMIN SERPL-MCNC: 3.2 G/DL (ref 3.4–5)
ALP BLD-CCNC: 126 U/L (ref 40–129)
ALT SERPL-CCNC: 10 U/L (ref 10–40)
ANION GAP SERPL CALCULATED.3IONS-SCNC: 14 MMOL/L (ref 3–16)
AST SERPL-CCNC: 15 U/L (ref 15–37)
BASE EXCESS ARTERIAL: -1.2 MMOL/L (ref -3–3)
BASE EXCESS ARTERIAL: -3.8 MMOL/L (ref -3–3)
BASOPHILS ABSOLUTE: 0.1 K/UL (ref 0–0.2)
BASOPHILS RELATIVE PERCENT: 0.8 %
BILIRUB SERPL-MCNC: 0.5 MG/DL (ref 0–1)
BILIRUBIN DIRECT: <0.2 MG/DL (ref 0–0.3)
BILIRUBIN, INDIRECT: ABNORMAL MG/DL (ref 0–1)
BLOOD CULTURE, ROUTINE: NORMAL
BUN BLDV-MCNC: 30 MG/DL (ref 7–20)
CALCIUM SERPL-MCNC: 8.8 MG/DL (ref 8.3–10.6)
CARBOXYHEMOGLOBIN ARTERIAL: 0.2 % (ref 0–1.5)
CARBOXYHEMOGLOBIN ARTERIAL: 0.3 % (ref 0–1.5)
CHLORIDE BLD-SCNC: 106 MMOL/L (ref 99–110)
CO2: 20 MMOL/L (ref 21–32)
CREAT SERPL-MCNC: 2.6 MG/DL (ref 0.9–1.3)
CULTURE, BLOOD 2: NORMAL
EOSINOPHILS ABSOLUTE: 0.1 K/UL (ref 0–0.6)
EOSINOPHILS RELATIVE PERCENT: 1.8 %
GFR AFRICAN AMERICAN: 34
GFR NON-AFRICAN AMERICAN: 28
GLUCOSE BLD-MCNC: 118 MG/DL (ref 70–99)
GLUCOSE BLD-MCNC: 138 MG/DL (ref 70–99)
GLUCOSE BLD-MCNC: 270 MG/DL (ref 70–99)
GLUCOSE BLD-MCNC: 304 MG/DL (ref 70–99)
GLUCOSE BLD-MCNC: 334 MG/DL (ref 70–99)
GLUCOSE BLD-MCNC: 339 MG/DL (ref 70–99)
HCO3 ARTERIAL: 19.8 MMOL/L (ref 21–29)
HCO3 ARTERIAL: 22.2 MMOL/L (ref 21–29)
HCT VFR BLD CALC: 38.7 % (ref 40.5–52.5)
HEMOGLOBIN, ART, EXTENDED: 11.1 G/DL (ref 13.5–17.5)
HEMOGLOBIN, ART, EXTENDED: 11.4 G/DL (ref 13.5–17.5)
HEMOGLOBIN: 12.7 G/DL (ref 13.5–17.5)
LYMPHOCYTES ABSOLUTE: 0.8 K/UL (ref 1–5.1)
LYMPHOCYTES RELATIVE PERCENT: 12.1 %
MAGNESIUM: 2.2 MG/DL (ref 1.8–2.4)
MCH RBC QN AUTO: 29.8 PG (ref 26–34)
MCHC RBC AUTO-ENTMCNC: 32.8 G/DL (ref 31–36)
MCV RBC AUTO: 90.7 FL (ref 80–100)
METHEMOGLOBIN ARTERIAL: 0.3 %
METHEMOGLOBIN ARTERIAL: 0.3 %
MONOCYTES ABSOLUTE: 0.8 K/UL (ref 0–1.3)
MONOCYTES RELATIVE PERCENT: 13.1 %
NEUTROPHILS ABSOLUTE: 4.6 K/UL (ref 1.7–7.7)
NEUTROPHILS RELATIVE PERCENT: 72.2 %
O2 SAT, ARTERIAL: 94.7 %
O2 SAT, ARTERIAL: 99.1 %
O2 THERAPY: ABNORMAL
O2 THERAPY: ABNORMAL
PCO2 ARTERIAL: 31.2 MMHG (ref 35–45)
PCO2 ARTERIAL: 32.7 MMHG (ref 35–45)
PDW BLD-RTO: 14.6 % (ref 12.4–15.4)
PERFORMED ON: ABNORMAL
PH ARTERIAL: 7.42 (ref 7.35–7.45)
PH ARTERIAL: 7.45 (ref 7.35–7.45)
PHOSPHORUS: 3.4 MG/DL (ref 2.5–4.9)
PLATELET # BLD: 150 K/UL (ref 135–450)
PMV BLD AUTO: 8.4 FL (ref 5–10.5)
PO2 ARTERIAL: 154.6 MMHG (ref 75–108)
PO2 ARTERIAL: 68.8 MMHG (ref 75–108)
POTASSIUM SERPL-SCNC: 3.2 MMOL/L (ref 3.5–5.1)
RBC # BLD: 4.26 M/UL (ref 4.2–5.9)
SODIUM BLD-SCNC: 140 MMOL/L (ref 136–145)
TCO2 ARTERIAL: 20.7 MMOL/L
TCO2 ARTERIAL: 23.2 MMOL/L
TOTAL PROTEIN: 6.3 G/DL (ref 6.4–8.2)
VANCOMYCIN RANDOM: 10.3 UG/ML
WBC # BLD: 6.4 K/UL (ref 4–11)

## 2021-09-11 PROCEDURE — 85025 COMPLETE CBC W/AUTO DIFF WBC: CPT

## 2021-09-11 PROCEDURE — 2700000000 HC OXYGEN THERAPY PER DAY

## 2021-09-11 PROCEDURE — 6360000002 HC RX W HCPCS: Performed by: INTERNAL MEDICINE

## 2021-09-11 PROCEDURE — 2580000003 HC RX 258: Performed by: INTERNAL MEDICINE

## 2021-09-11 PROCEDURE — 2000000000 HC ICU R&B

## 2021-09-11 PROCEDURE — 6370000000 HC RX 637 (ALT 250 FOR IP): Performed by: INTERNAL MEDICINE

## 2021-09-11 PROCEDURE — 99291 CRITICAL CARE FIRST HOUR: CPT | Performed by: INTERNAL MEDICINE

## 2021-09-11 PROCEDURE — 2500000003 HC RX 250 WO HCPCS: Performed by: INTERNAL MEDICINE

## 2021-09-11 PROCEDURE — 80076 HEPATIC FUNCTION PANEL: CPT

## 2021-09-11 PROCEDURE — 94761 N-INVAS EAR/PLS OXIMETRY MLT: CPT

## 2021-09-11 PROCEDURE — 99233 SBSQ HOSP IP/OBS HIGH 50: CPT | Performed by: INTERNAL MEDICINE

## 2021-09-11 PROCEDURE — 80202 ASSAY OF VANCOMYCIN: CPT

## 2021-09-11 PROCEDURE — 80048 BASIC METABOLIC PNL TOTAL CA: CPT

## 2021-09-11 PROCEDURE — 94003 VENT MGMT INPAT SUBQ DAY: CPT

## 2021-09-11 PROCEDURE — 36415 COLL VENOUS BLD VENIPUNCTURE: CPT

## 2021-09-11 PROCEDURE — 83735 ASSAY OF MAGNESIUM: CPT

## 2021-09-11 PROCEDURE — 82803 BLOOD GASES ANY COMBINATION: CPT

## 2021-09-11 PROCEDURE — 71045 X-RAY EXAM CHEST 1 VIEW: CPT

## 2021-09-11 PROCEDURE — 84100 ASSAY OF PHOSPHORUS: CPT

## 2021-09-11 RX ORDER — POTASSIUM CHLORIDE 29.8 MG/ML
40 INJECTION INTRAVENOUS ONCE
Status: DISCONTINUED | OUTPATIENT
Start: 2021-09-11 | End: 2021-09-11

## 2021-09-11 RX ORDER — SODIUM CHLORIDE 9 MG/ML
INJECTION, SOLUTION INTRAVENOUS CONTINUOUS
Status: DISCONTINUED | OUTPATIENT
Start: 2021-09-11 | End: 2021-09-12

## 2021-09-11 RX ORDER — POTASSIUM CHLORIDE 7.45 MG/ML
10 INJECTION INTRAVENOUS
Status: COMPLETED | OUTPATIENT
Start: 2021-09-11 | End: 2021-09-11

## 2021-09-11 RX ADMIN — POTASSIUM CHLORIDE 10 MEQ: 7.46 INJECTION, SOLUTION INTRAVENOUS at 12:16

## 2021-09-11 RX ADMIN — MIDAZOLAM HYDROCHLORIDE 2 MG: 1 INJECTION, SOLUTION INTRAMUSCULAR; INTRAVENOUS at 11:06

## 2021-09-11 RX ADMIN — CEFEPIME HYDROCHLORIDE 1000 MG: 1 INJECTION, POWDER, FOR SOLUTION INTRAMUSCULAR; INTRAVENOUS at 16:48

## 2021-09-11 RX ADMIN — FENTANYL CITRATE 25 MCG: 0.05 INJECTION, SOLUTION INTRAMUSCULAR; INTRAVENOUS at 13:30

## 2021-09-11 RX ADMIN — PROPOFOL 25 MCG/KG/MIN: 10 INJECTION, EMULSION INTRAVENOUS at 12:19

## 2021-09-11 RX ADMIN — HYDRALAZINE HYDROCHLORIDE 10 MG: 20 INJECTION INTRAMUSCULAR; INTRAVENOUS at 04:27

## 2021-09-11 RX ADMIN — PROPOFOL 25 MCG/KG/MIN: 10 INJECTION, EMULSION INTRAVENOUS at 04:50

## 2021-09-11 RX ADMIN — MIDAZOLAM HYDROCHLORIDE 2 MG: 1 INJECTION, SOLUTION INTRAMUSCULAR; INTRAVENOUS at 20:21

## 2021-09-11 RX ADMIN — CEFEPIME HYDROCHLORIDE 1000 MG: 1 INJECTION, POWDER, FOR SOLUTION INTRAMUSCULAR; INTRAVENOUS at 10:02

## 2021-09-11 RX ADMIN — VANCOMYCIN HYDROCHLORIDE 1000 MG: 1 INJECTION, POWDER, LYOPHILIZED, FOR SOLUTION INTRAVENOUS at 08:49

## 2021-09-11 RX ADMIN — MIDAZOLAM HYDROCHLORIDE 2 MG: 1 INJECTION, SOLUTION INTRAMUSCULAR; INTRAVENOUS at 06:00

## 2021-09-11 RX ADMIN — CARBOXYMETHYLCELLULOSE SODIUM 1 DROP: 10 GEL OPHTHALMIC at 16:50

## 2021-09-11 RX ADMIN — Medication 15 ML: at 20:15

## 2021-09-11 RX ADMIN — INSULIN LISPRO 12 UNITS: 100 INJECTION, SOLUTION INTRAVENOUS; SUBCUTANEOUS at 08:53

## 2021-09-11 RX ADMIN — INSULIN GLARGINE 50 UNITS: 100 INJECTION, SOLUTION SUBCUTANEOUS at 10:00

## 2021-09-11 RX ADMIN — SODIUM BICARBONATE: 84 INJECTION, SOLUTION INTRAVENOUS at 10:04

## 2021-09-11 RX ADMIN — INSULIN LISPRO 12 UNITS: 100 INJECTION, SOLUTION INTRAVENOUS; SUBCUTANEOUS at 12:24

## 2021-09-11 RX ADMIN — SODIUM CHLORIDE: 9 INJECTION, SOLUTION INTRAVENOUS at 12:05

## 2021-09-11 RX ADMIN — MUPIROCIN: 20 OINTMENT TOPICAL at 08:34

## 2021-09-11 RX ADMIN — WHITE PETROLATUM 57.7 %-MINERAL OIL 31.9 % EYE OINTMENT: at 16:50

## 2021-09-11 RX ADMIN — CARBOXYMETHYLCELLULOSE SODIUM 1 DROP: 10 GEL OPHTHALMIC at 12:10

## 2021-09-11 RX ADMIN — POTASSIUM CHLORIDE 10 MEQ: 7.46 INJECTION, SOLUTION INTRAVENOUS at 13:15

## 2021-09-11 RX ADMIN — TOPIRAMATE 50 MG: 25 TABLET, FILM COATED ORAL at 20:20

## 2021-09-11 RX ADMIN — PROPOFOL 40 MCG/KG/MIN: 10 INJECTION, EMULSION INTRAVENOUS at 16:48

## 2021-09-11 RX ADMIN — WHITE PETROLATUM 57.7 %-MINERAL OIL 31.9 % EYE OINTMENT: at 01:36

## 2021-09-11 RX ADMIN — Medication 15 ML: at 08:34

## 2021-09-11 RX ADMIN — WHITE PETROLATUM 57.7 %-MINERAL OIL 31.9 % EYE OINTMENT: at 08:33

## 2021-09-11 RX ADMIN — WHITE PETROLATUM 57.7 %-MINERAL OIL 31.9 % EYE OINTMENT: at 13:30

## 2021-09-11 RX ADMIN — PROPOFOL 50 MCG/KG/MIN: 10 INJECTION, EMULSION INTRAVENOUS at 21:09

## 2021-09-11 RX ADMIN — SODIUM BICARBONATE: 84 INJECTION, SOLUTION INTRAVENOUS at 02:35

## 2021-09-11 RX ADMIN — WHITE PETROLATUM 57.7 %-MINERAL OIL 31.9 % EYE OINTMENT: at 20:14

## 2021-09-11 RX ADMIN — POTASSIUM CHLORIDE 10 MEQ: 7.46 INJECTION, SOLUTION INTRAVENOUS at 14:47

## 2021-09-11 RX ADMIN — INSULIN LISPRO 9 UNITS: 100 INJECTION, SOLUTION INTRAVENOUS; SUBCUTANEOUS at 04:11

## 2021-09-11 RX ADMIN — FENTANYL CITRATE 25 MCG: 0.05 INJECTION, SOLUTION INTRAMUSCULAR; INTRAVENOUS at 01:32

## 2021-09-11 RX ADMIN — WHITE PETROLATUM 57.7 %-MINERAL OIL 31.9 % EYE OINTMENT: at 04:19

## 2021-09-11 RX ADMIN — DEXTROSE MONOHYDRATE 500 MG: 50 INJECTION, SOLUTION INTRAVENOUS at 12:09

## 2021-09-11 RX ADMIN — POTASSIUM CHLORIDE 10 MEQ: 7.46 INJECTION, SOLUTION INTRAVENOUS at 15:34

## 2021-09-11 RX ADMIN — CARBOXYMETHYLCELLULOSE SODIUM 1 DROP: 10 GEL OPHTHALMIC at 20:15

## 2021-09-11 RX ADMIN — FAMOTIDINE 20 MG: 10 INJECTION INTRAVENOUS at 08:34

## 2021-09-11 RX ADMIN — CARBOXYMETHYLCELLULOSE SODIUM 1 DROP: 10 GEL OPHTHALMIC at 08:33

## 2021-09-11 RX ADMIN — FENTANYL CITRATE 25 MCG: 0.05 INJECTION, SOLUTION INTRAMUSCULAR; INTRAVENOUS at 20:20

## 2021-09-11 RX ADMIN — MUPIROCIN: 20 OINTMENT TOPICAL at 20:15

## 2021-09-11 RX ADMIN — CARBOXYMETHYLCELLULOSE SODIUM 1 DROP: 10 GEL OPHTHALMIC at 04:19

## 2021-09-11 RX ADMIN — MIDAZOLAM HYDROCHLORIDE 2 MG: 1 INJECTION, SOLUTION INTRAMUSCULAR; INTRAVENOUS at 01:32

## 2021-09-11 RX ADMIN — CEFEPIME HYDROCHLORIDE 1000 MG: 1 INJECTION, POWDER, FOR SOLUTION INTRAMUSCULAR; INTRAVENOUS at 01:36

## 2021-09-11 RX ADMIN — BUPROPION HYDROCHLORIDE 450 MG: 150 TABLET, EXTENDED RELEASE ORAL at 08:33

## 2021-09-11 ASSESSMENT — PULMONARY FUNCTION TESTS
PIF_VALUE: 14
PIF_VALUE: 11
PIF_VALUE: 35
PIF_VALUE: 30
PIF_VALUE: 21
PIF_VALUE: 28
PIF_VALUE: 19

## 2021-09-11 ASSESSMENT — PAIN SCALES - GENERAL
PAINLEVEL_OUTOF10: 8
PAINLEVEL_OUTOF10: 0
PAINLEVEL_OUTOF10: 8
PAINLEVEL_OUTOF10: 7
PAINLEVEL_OUTOF10: 0
PAINLEVEL_OUTOF10: 0
PAINLEVEL_OUTOF10: 4

## 2021-09-11 NOTE — PROGRESS NOTES
09/10/21 2003   Vent Information   $Ventilation $Subsequent Day   Skin Assessment Clean, dry, & intact   Vent Type 840   Vent Mode AC/VC   Vt Ordered 450 mL   Rate Set 24 bmp   Peak Flow 75 L/min   Pressure Support 0 cmH20   FiO2  35 %   SpO2 100 %   SpO2/FiO2 ratio 285.71   Sensitivity 3   PEEP/CPAP 5   I Time/ I Time % 0 s   Humidification Source HME   Vent Patient Data   High Peep/I Pressure 0   Peak Inspiratory Pressure 22 cmH2O   Mean Airway Pressure 10 cmH20   Rate Measured 33 br/min   Vt Exhaled 0 mL   Minute Volume 8 Liters   I:E Ratio 1:2.8   Plateau Pressure 13 VJA44   Static Compliance 58 mL/cmH2O   Dynamic Compliance 29 mL/cmH2O   Cough/Sputum   Sputum How Obtained Endotracheal   Cough Productive   Sputum Amount Moderate   Sputum Color Creamy   Tenacity Thick   Spontaneous Breathing Trial (SBT) RT Doc   Pulse 95   Breath Sounds   Right Upper Lobe Rhonchi   Right Middle Lobe Rhonchi   Right Lower Lobe Diminished   Left Upper Lobe Rhonchi   Left Lower Lobe Diminished   Additional Respiratory  Assessments   Resp 25   Alarm Settings   High Pressure Alarm 40 cmH2O   Low Minute Volume Alarm 2.5 L/min   Apnea (secs) 20 secs   High Respiratory Rate 45 br/min   Low Exhaled Vt  250 mL   Patient Observation   Observations 8ett 24 at lip

## 2021-09-11 NOTE — PROGRESS NOTES
Nephrology Progress Note   [email or web address]      Sub/interval history  On vent    Last 24 h uop 2.5l     ROS: UTO  PSFH: No visitor    Scheduled Meds:   insulin glargine  50 Units SubCUTAneous QAM    cariprazine hcl  6 mg Oral Daily    buPROPion  450 mg Oral Daily    topiramate  50 mg Oral Nightly    famotidine (PEPCID) injection  20 mg IntraVENous Daily    insulin lispro  0-18 Units SubCUTAneous Q4H    vancomycin (VANCOCIN) intermittent dosing (placeholder)   Other RX Placeholder    cefepime  1,000 mg IntraVENous Q8H    mupirocin   Nasal BID    azithromycin  500 mg IntraVENous Q24H    chlorhexidine  15 mL Mouth/Throat BID    [Held by provider] enoxaparin  40 mg SubCUTAneous Daily    carboxymethylcellulose PF  1 drop Both Eyes Q4H    And    artificial tears   Both Eyes Q4H     Continuous Infusions:   sodium chloride 75 mL/hr at 09/11/21 1407    dextrose      propofol 25 mcg/kg/min (09/11/21 1219)     PRN Meds:.glucose, dextrose, glucagon (rDNA), dextrose, hydrALAZINE, acetaminophen, ondansetron, dextrose, dextrose, polyethylene glycol, fentanNYL, midazolam    Objective/     Vitals:    09/11/21 1300 09/11/21 1400 09/11/21 1402 09/11/21 1500   BP: (!) 179/94  (!) 153/90 (!) 159/93   Pulse: 90 86 87 85   Resp: 24 24 24 24   Temp:       TempSrc:       SpO2: 100% 100% 100% 100%   Weight:       Height:         24HR INTAKE/OUTPUT:      Intake/Output Summary (Last 24 hours) at 9/11/2021 1603  Last data filed at 9/11/2021 1407  Gross per 24 hour   Intake 4666.09 ml   Output 1575 ml   Net 3091.09 ml     Constitutional:  Alert, awake, no apparent distress  Cardiovascular:  S1, S2 without m/r/g; trace lower extremity edema  Respiratory:  Decreased   Abdomen: +bs, soft, nt    Data/  Recent Labs     09/09/21  0503 09/10/21  0433 09/11/21  0425   WBC 7.8 7.0 6.4   HGB 13.6 11.0* 12.7*   HCT 41.4 33.1* 38.7*   MCV 92.7 90.8 90.7   * 130* 150     Recent Labs     09/09/21  1100 09/10/21  0880 09/11/21  0425   * 137 140   K 4.0 3.6 3.2*    111* 106   CO2 11* 14* 20*   GLUCOSE 309* 315* 304*   PHOS 3.4 3.5 3.4   MG 1.90 2.10 2.20   BUN 18 24* 30*   CREATININE 2.5* 2.9* 2.6*   LABGLOM 29* 25* 28*   GFRAA 35* 30* 34*     Ua w glycosuria n trace protein ow bland  Jemal 60    Assessment/   - Acute kidney injury - pre-renal/ATN injury in setting of DKA + PNA     - Anion-gap metabolic acidosis in setting of DKA, elevated lactate     - Hyponatremia/Hyperkalemia - secondary to translocation, better with insulin     - Acute hypoxic respiratory failure/PNA - on ventilator     - Acute metabolic encephalopathy        Plan/   -agree with IVF as ordered  -k replaced  - Home doses of lisinopril and voltaren on hold  - Monitor vancomycin levels with dosing  --Serial renal panel  -daily wts and strict i/o  -renal dose medications   -avoid nephrotoxins      Aura Jones MD  Office: [phone]  Fax:    Ebene[facility] Pederson [phone]

## 2021-09-11 NOTE — PROGRESS NOTES
After the SBT Dr. Vester Dakin discontinued versed. Patient has a full unopened bag of versed in the room. This full 100 mg bag of versed was sent back to pharmacy by Carol Parra RN and Mary Howe RN at the pharmacies request. Wasted 40 mL of the currently infusing versed with Mary Howe RN by the main [address] in ICU. The 40 ml of versed was disposed of in the [facility] prescription waste system.  Electronically signed by Valeria Bernal RN on 9/11/2021 at 12:33 PM  Electronically signed by Kody Abraham RN on 9/11/2021 at 12:34 PM

## 2021-09-11 NOTE — PROGRESS NOTES
Vancomycin Day 5  Pulse dosing  SCr = 2.6  Random vanc level this am at 04:25 = 10.3    Give Vancomycin 1000 mg x 1 dose today  Will recheck random level and labs tomorrow am.  Esperanza Warren [facility]

## 2021-09-11 NOTE — PROGRESS NOTES
Apnea (secs) 20 secs   Low Exhaled Vt  250 mL   Non-Surgical Airway 09/07/21 Endo Tracheal Tube   Placement Date/Time: 09/07/21 1650   Placed By: In ED  Inserted by: Dr Shelby Humphrey  Airway Device: Endo Tracheal Tube  Size: 8   Secured at 23 cm   Measured From DILLON Pratt  (moved to center)   Secured By Commercial tube anne   Site Condition Axonify

## 2021-09-11 NOTE — FLOWSHEET NOTE
PRN hydralazine given at this time.       09/11/21 0400   Vital Signs   Temp 98.7 °F (37.1 °C)   Temp Source Bladder   Pulse 91   Heart Rate Source Monitor   Resp 26   BP (!) 178/86   BP Location Left leg   MAP (mmHg) 111   Patient Position Semi fowlers   Level of Consciousness Responds to Pain (2)   MEWS Score 2   Patient Currently in Pain No   Pain Assessment   Pain Assessment CPOT   RASS Score -2   Pain Level 0   Response to Pain Intervention Patient Satisfied   CPOT (Critical Patient)   CPOT (Critical Patient) Facial Expression 0   CPOT (Critical Patient) Body Movement 0   CPOT (Critical Patient) Muscle Tension 0   CPOT (Critical Patient) Compiance with Vent 0   Score CPOT (Vent) 0   CPOT (Critial Patient) Vent Status Intubated   Oxygen Therapy   SpO2 100 %   Pulse Oximeter Device Mode Continuous   Pulse Oximeter Device Location Finger   O2 Device Ventilator   Skin Assessment Clean, dry, & intact   Skin Protection for O2 Device Yes   Orientation Bilateral   Location Cheek   Intervention(s) Hydrocolloid Dressing   FiO2  35 %   PEEP/CPAP (cm H2O) 5 cm H20   Height and Weight   Weight 193 lb 6.4 oz (87.7 kg)   Weight Method Bed scale   BMI (Calculated) 28.6

## 2021-09-11 NOTE — PROGRESS NOTES
Respiratory  Assessments   Resp 24   Position Semi-Vila's   Alarm Settings   Apnea (secs) 20 secs   Low Exhaled Vt  250 mL   Non-Surgical Airway 09/07/21 Endo Tracheal Tube   Placement Date/Time: 09/07/21 1659   Placed By: In ED  Inserted by: Dr Stahl Quiet  Airway Device: Endo Tracheal Tube  Size: 8   Secured at 23 cm   Measured From [address] [facility] [address] By Commercial tube anne   Site Condition Dry

## 2021-09-11 NOTE — FLOWSHEET NOTE
09/10/21 2225   Vital Signs   BP (!) 181/92     PRN Hydralazine given at this time. Will continue to monitor.    Electronically signed by Josephine Holt RN on 9/10/2021 at 10:28 PM

## 2021-09-11 NOTE — PROGRESS NOTES
Pulmonary & Critical Care Medicine ICU Progress Note    CC: Acute respiratory failure with hypoxemia, DKA    Events of Last 24 hours: patient tolerating SBT but not following commands. Invasive Lines:   IV Line: pivs    MV:  9/7  Vent Mode: AC/VC Rate Set: 24 bmp/Vt Ordered: 450 mL/ /FiO2 : 35 %  Recent Labs     09/10/21  1214 09/11/21  0435   PHART 7.314* 7.420   [id number] 30.5* 31.2*   PO2ART 60.9* 154.6*       IV:   IV infusion builder 150 mL/hr at 09/11/21 0422    dextrose      propofol 25 mcg/kg/min (09/11/21 0450)    midazolam 3 mg/hr (09/10/21 1442)       EXAM:  BP (!) 169/85   Pulse 132   Temp 98.7 °F (37.1 °C) (Bladder)   Resp (!) 31   Ht 5' 9\" (1.753 m)   Wt 193 lb 6.4 oz (87.7 kg)   SpO2 100%   BMI 28.56 kg/m²  on vent   Tmax:100.1F  CVP:      Intake/Output Summary (Last 24 hours) at 9/11/2021 0659  Last data filed at 9/11/2021 0422  Gross per 24 hour   Intake 3508.8 ml   Output 2525 ml   Net 983.8 ml     Gen: No distress. NCAT  Eyes: PERRL. No sclera icterus. No conjunctival injection. ENT: No discharge. Pharynx clear. Neck: Trachea midline. No obvious mass. Resp: No accessory muscle use. No crackles. No wheezes. No rhonchi. No dullness on percussion. CV: Regular rate. Regular rhythm. No murmur or rub. No edema. Skin: Warm and dry. No nodule on exposed extremities. M/S: No cyanosis. No joint deformity. No clubbing.    Neuro: awake, eyes open, moving all extremities but not following commands    Medications:   vancomycin  1,000 mg IntraVENous Once    insulin glargine  50 Units SubCUTAneous QAM    cariprazine hcl  6 mg Oral Daily    buPROPion  450 mg Oral Daily    topiramate  50 mg Oral Nightly    famotidine (PEPCID) injection  20 mg IntraVENous Daily    insulin lispro  0-18 Units SubCUTAneous Q4H    vancomycin (VANCOCIN) intermittent dosing (placeholder)   Other RX Placeholder    cefepime  1,000 mg IntraVENous Q8H    mupirocin   Nasal BID    azithromycin  500 mg IntraVENous Q24H    chlorhexidine  15 mL Mouth/Throat BID    [Held by provider] enoxaparin  40 mg SubCUTAneous Daily    carboxymethylcellulose PF  1 drop Both Eyes Q4H    And    artificial tears   Both Eyes Q4H     PRN Meds:  glucose, dextrose, glucagon (rDNA), dextrose, hydrALAZINE, acetaminophen, ondansetron, dextrose, dextrose, polyethylene glycol, fentanNYL, midazolam    Results:  CBC:   Recent Labs     09/09/21  0503 09/10/21  0433 09/11/21  0425   WBC 7.8 7.0 6.4   HGB 13.6 11.0* 12.7*   HCT 41.4 33.1* 38.7*   MCV 92.7 90.8 90.7   * 130* 150     BMP:   Recent Labs     09/09/21  1100 09/10/21  0433 09/11/21  0425   * 137 140   K 4.0 3.6 3.2*    111* 106   CO2 11* 14* 20*   PHOS 3.4 3.5 3.4   BUN 18 24* 30*   CREATININE 2.5* 2.9* 2.6*     LIVER PROFILE:   Recent Labs     09/09/21  0503 09/10/21  0433 09/11/21  0425   AST 24 10* 15   ALT 14 10 10   BILIDIR <0.2 <0.2 <0.2   BILITOT <0.2 0.4 0.5   ALKPHOS 136* 120 126     PT/INR: No results for input(s): PROTIME, INR in the last 72 hours. APTT: No results for input(s): APTT in the last 72 hours.   UA:  Recent Labs     09/10/21  1730   COLORU Yellow   PHUR 5.5   WBCUA 3-5   RBCUA 0-2   MUCUS Rare*   BACTERIA 2+*   CLARITYU SL CLOUDY*   SPECGRAV 1.015   LEUKOCYTESUR Negative   UROBILINOGEN 0.2   BILIRUBINUR Negative   BLOODU Negative   GLUCOSEU >=1000*   AMORPHOUS 1+       Cultures:  9/7 blood- ngtd  9/8 sputum- ngtd  9/7 sars Cov2- neg     Films:  CXR 9/10: Tubes and lines are unchanged.  Mild patchy right basilar airspace disease is overall similar in appearance to the previous exam.      9/7 sars Cov2- neg      ASSESSMENT:  ·  Acute respiratory failure with hypoxemia  · RLL pneumonia - Healthcare associated pneumonia - probable gram negative, risk for methicillin resistant Staph aureus as well   · Severe diabetic ketoacidosis   · NOVA  · hyponatremia  · Elevated liver enzymes  · Acute metabolic encephalopathy  · Thrombocytopenia -  HIT neg  · hyperglycemia        PLAN:  · Mechanical ventilation per my orders. The ventilator was adjusted by me at the bedside for unstable, life threatening respiratory failure. · HOB greater than 30 degrees at all times  · Daily SBT once FIO2<60% and PEEP = 5, patient arousable, hemodynamically stable  · IV Sedation with propofol and fentanyl gtt targeted RASS -2  · D/c versed  · ABX: vanc and cefepime D5; D4 azithromycin  · tube feeds per nutrition recs  · SQ insulin - lantus 50 U and HSSI  · ICU care- hold lovenox and bactroban  ·  hydralazine for HTN prn  · Nephrology consult appreciated- IVF per them  ·  home psych meds Breylar, neurontin and wellburin    Critical care time spent reviewing labs/films, examining patient, collaborating with other physicians but excluding procedures for life threatening organ failure is 32  minutes.

## 2021-09-11 NOTE — PROGRESS NOTES
Internal Medicine ICU Progress Note      Events of Last 24 hours:     20-year-old white male who brought in the hospital with DKA. He has a history of diabetes mellitus type 1, juvenile rheumatoid arthritis, celiac disease, dermatomyositis, hypertension, hyperlipidemia, schizoaffective disorder. He was eve in for altered mental status. Work-up showed DKA. t his pH is 6.8 and he was intubated for severe respiratory distress and acidosis. He had a brief run of V. tach. He had a white cell count of 32,000 and hyperkalemia. He is admitted the ICU. This morning he is sedated on the ventilator. He had. Of agitation last night. He has a low-grade fever. -patient's DKA is resolved. Still requiring ventilatory support. Low-grade fever. Sedated on the vent. Only responds to pain. 9/10-sedation has been decreased. Oxygenation is good. Urine output is great. Creatinine is increased. Plans for spontaneous breathing trial.    -patient is spontaneous breathing trial this morning but is not waking up. When I made rounds patient is opening eyes to verbal commands but is still somewhat sleepy. Sedation is been slowly decreased.       Invasive Lines: PIV x 3       MV: Intubated on 2021    Recent Labs     21  0435 21  1035   PHART 7.420 7.449   VBA9PTK 31.2* 32.7*   PO2ART 154.6* 68.8*       MV Settings:  Vent Mode: AC/VC Rate Set: 0 bmp/Vt Ordered: 450 mL/ /FiO2 : 35 %    IV:   sodium chloride 75 mL/hr at 21 1407    dextrose      propofol 25 mcg/kg/min (21 1219)       Vitals:  Temp  Av.3 °F (37.4 °C)  Min: 98.6 °F (37 °C)  Max: 100.1 °F (37.8 °C)  Pulse  Av.1  Min: 81  Max: 132  BP  Min: 145/71  Max: 189/97  SpO2  Av %  Min: 100 %  Max: 100 %  FiO2   Av %  Min: 35 %  Max: 35 %  Patient Vitals for the past 4 hrs:   BP Temp Pulse Resp SpO2   21 1402 (!) 153/90 -- 87 24 100 %   21 1400 -- -- 86 24 100 %   21 1300 (!) 179/94 -- 90 24 100 %   09/11/21 1200 -- 99 °F (37.2 °C) 89 25 100 %       CVP:        Intake/Output Summary (Last 24 hours) at 9/11/2021 1518  Last data filed at 9/11/2021 1407  Gross per 24 hour   Intake 4666.09 ml   Output 1875 ml   Net 2791.09 ml       EXAM:  General: Sick appearing. Opens eyes to verbal commands but follows no other commands  Eyes: PERRL. No sclera icterus. No conjunctiva injected. ENT: No discharge. Intubated  Neck: Trachea midline. Normal thyroid. Resp: No accessory muscle use. Right lower lobe crackles. No wheezing. No rhonchi. No dullness on percussion. CV: Regular rate. Regular rhythm. No mumur or rub. No edema. No JVD. Palpable pedal pulses. GI: Non-tender. Non-distended. No masses. No organmegaly. Normal bowel sounds. No hernia. Skin: Warm and dry. No nodule on exposed extremities. No rash on exposed extremities. Lymph: No cervical LAD. No supraclavicular LAD. M/S: No cyanosis. No joint deformity. No clubbing. Neuro: Opens eyes to verbal commands  Psych:  Intubated        Medications:  Scheduled Meds:   potassium chloride  10 mEq IntraVENous Q1H    insulin glargine  50 Units SubCUTAneous QAM    cariprazine hcl  6 mg Oral Daily    buPROPion  450 mg Oral Daily    topiramate  50 mg Oral Nightly    famotidine (PEPCID) injection  20 mg IntraVENous Daily    insulin lispro  0-18 Units SubCUTAneous Q4H    vancomycin (VANCOCIN) intermittent dosing (placeholder)   Other RX Placeholder    cefepime  1,000 mg IntraVENous Q8H    mupirocin   Nasal BID    azithromycin  500 mg IntraVENous Q24H    chlorhexidine  15 mL Mouth/Throat BID    [Held by provider] enoxaparin  40 mg SubCUTAneous Daily    carboxymethylcellulose PF  1 drop Both Eyes Q4H    And    artificial tears   Both Eyes Q4H       PRN Meds:  glucose, dextrose, glucagon (rDNA), dextrose, hydrALAZINE, acetaminophen, ondansetron, dextrose, dextrose, polyethylene glycol, fentanNYL, midazolam    Results:  CBC:   Recent Labs ([facility] [address]    NOVA (acute kidney injury) ([facility] [address]    HCAP (healthcare-associated pneumonia)    Acute respiratory failure with hypoxia ([facility] [address]    Acute metabolic encephalopathy    Thrombocytopenia (HCC)  Resolved Problems:    * No resolved hospital problems. *           Plan:    #DKA with type 1 diabetes. Admitted to hospital with DKA. Started on IV insulin. DKA protocol. His anion gap is closed. He is off his insulin drip. Started Lantus and sliding scale insulin. Monitor and replace electrolytes as needed. Tube feeds started. #Acute hypoxemic respiratory failure. Intubated for respiratory distress. Seen by pulmonologist.  Continue mechanical ventilation. Dose of sedation has been cut back. Spontaneous breathing trial.  COVID-19 negative. Slowly waking up. Possible extubation tomorrow. #NOVA. Creatinine is stable but high urine output is good. Nephrology consulted. No plans for dialysis as good urine output. #Right lower lobe pneumonia. Risk for gram-negative and MRSA pneumonia. On IV cefepime and Vanco day 5. He is on IV Zithromax day #4 cultures sent. #Hypertension. On lisinopril at home. Hold because of NOVA. Started on as needed hydralazine    #Juvenile rheumatoid arthritis. On Plaquenil. Hold. #History of dermatomyositis. #Thrombocytopenia. Significant drop in platelets. Hold Lovenox. HIT ordered. -HIT antibody negative    #Acute metabolic encephalopathy at the time of admission. Presently sedated on the ventilator. #Hold Lovenox due to drop in platelets. SCD for DVT prophylaxis. #Pulmonologist resumed home psychiatric medications. He is on Wellbutrin and Breylar    IMPORTANT: Please note that some portions of this note may have been created using Dragon voice recognition software. Some \"sound-alike\" and totally wrong word substitutions may have taken place due to known inherent limitations of any such software, including this voice recognition software.   In spite of efforts to eliminate such errors, some may not have been corrected. So please read the note with this in mind and recognize such mistakes and understand the correct version using the  context. If there are still uncertainties in the mind of the medical provider reading this note about any aspect of the note, the provider can feel free to contact me. Thanks. All questions and concerns were addressed to the patient/family. Alternatives to my treatment were discussed. The note was completed using EMR. Every effort was made to ensure accuracy; however, inadvertent computerized transcription errors may be present.          Chaim Giron MD 3:18 PM 9/11/2021

## 2021-09-12 LAB
ALBUMIN SERPL-MCNC: 2.9 G/DL (ref 3.4–5)
ALP BLD-CCNC: 109 U/L (ref 40–129)
ALT SERPL-CCNC: 8 U/L (ref 10–40)
ANION GAP SERPL CALCULATED.3IONS-SCNC: 14 MMOL/L (ref 3–16)
AST SERPL-CCNC: 12 U/L (ref 15–37)
BASE EXCESS ARTERIAL: -1.4 MMOL/L (ref -3–3)
BASE EXCESS ARTERIAL: -4.3 MMOL/L (ref -3–3)
BASOPHILS ABSOLUTE: 0.1 K/UL (ref 0–0.2)
BASOPHILS RELATIVE PERCENT: 1.4 %
BILIRUB SERPL-MCNC: <0.2 MG/DL (ref 0–1)
BILIRUBIN DIRECT: <0.2 MG/DL (ref 0–0.3)
BILIRUBIN, INDIRECT: ABNORMAL MG/DL (ref 0–1)
BUN BLDV-MCNC: 34 MG/DL (ref 7–20)
CALCIUM SERPL-MCNC: 8.5 MG/DL (ref 8.3–10.6)
CARBOXYHEMOGLOBIN ARTERIAL: 0.3 % (ref 0–1.5)
CARBOXYHEMOGLOBIN ARTERIAL: 0.3 % (ref 0–1.5)
CHLORIDE BLD-SCNC: 102 MMOL/L (ref 99–110)
CO2: 19 MMOL/L (ref 21–32)
CREAT SERPL-MCNC: 2.4 MG/DL (ref 0.9–1.3)
CULTURE, RESPIRATORY: ABNORMAL
CULTURE, RESPIRATORY: ABNORMAL
EOSINOPHILS ABSOLUTE: 0.2 K/UL (ref 0–0.6)
EOSINOPHILS RELATIVE PERCENT: 2.7 %
GFR AFRICAN AMERICAN: 37
GFR NON-AFRICAN AMERICAN: 30
GLUCOSE BLD-MCNC: 110 MG/DL (ref 70–99)
GLUCOSE BLD-MCNC: 194 MG/DL (ref 70–99)
GLUCOSE BLD-MCNC: 217 MG/DL (ref 70–99)
GLUCOSE BLD-MCNC: 251 MG/DL (ref 70–99)
GLUCOSE BLD-MCNC: 254 MG/DL (ref 70–99)
GLUCOSE BLD-MCNC: 287 MG/DL (ref 70–99)
GLUCOSE BLD-MCNC: 71 MG/DL (ref 70–99)
GLUCOSE BLD-MCNC: 79 MG/DL (ref 70–99)
GRAM STAIN RESULT: ABNORMAL
HCO3 ARTERIAL: 18.9 MMOL/L (ref 21–29)
HCO3 ARTERIAL: 21.7 MMOL/L (ref 21–29)
HCT VFR BLD CALC: 30.7 % (ref 40.5–52.5)
HEMATOLOGY PATH CONSULT: NO
HEMOGLOBIN, ART, EXTENDED: 10.7 G/DL (ref 13.5–17.5)
HEMOGLOBIN, ART, EXTENDED: 12.5 G/DL (ref 13.5–17.5)
HEMOGLOBIN: 10.2 G/DL (ref 13.5–17.5)
LYMPHOCYTES ABSOLUTE: 0.9 K/UL (ref 1–5.1)
LYMPHOCYTES RELATIVE PERCENT: 10.9 %
MAGNESIUM: 2.1 MG/DL (ref 1.8–2.4)
MCH RBC QN AUTO: 30.2 PG (ref 26–34)
MCHC RBC AUTO-ENTMCNC: 33.3 G/DL (ref 31–36)
MCV RBC AUTO: 90.6 FL (ref 80–100)
METHEMOGLOBIN ARTERIAL: 0.3 %
METHEMOGLOBIN ARTERIAL: 0.3 %
MONOCYTES ABSOLUTE: 1.2 K/UL (ref 0–1.3)
MONOCYTES RELATIVE PERCENT: 15 %
NEUTROPHILS ABSOLUTE: 5.6 K/UL (ref 1.7–7.7)
NEUTROPHILS RELATIVE PERCENT: 70 %
O2 SAT, ARTERIAL: 97.9 %
O2 SAT, ARTERIAL: 98.5 %
O2 THERAPY: ABNORMAL
O2 THERAPY: ABNORMAL
ORGANISM: ABNORMAL
PCO2 ARTERIAL: 28.7 MMHG (ref 35–45)
PCO2 ARTERIAL: 31.4 MMHG (ref 35–45)
PDW BLD-RTO: 14.5 % (ref 12.4–15.4)
PERFORMED ON: ABNORMAL
PERFORMED ON: NORMAL
PERFORMED ON: NORMAL
PH ARTERIAL: 7.44 (ref 7.35–7.45)
PH ARTERIAL: 7.46 (ref 7.35–7.45)
PHOSPHORUS: 2.5 MG/DL (ref 2.5–4.9)
PLATELET # BLD: 168 K/UL (ref 135–450)
PLATELET SLIDE REVIEW: ADEQUATE
PMV BLD AUTO: 9 FL (ref 5–10.5)
PO2 ARTERIAL: 102.1 MMHG (ref 75–108)
PO2 ARTERIAL: 117.1 MMHG (ref 75–108)
POTASSIUM SERPL-SCNC: 3 MMOL/L (ref 3.5–5.1)
RBC # BLD: 3.39 M/UL (ref 4.2–5.9)
SLIDE REVIEW: ABNORMAL
SODIUM BLD-SCNC: 135 MMOL/L (ref 136–145)
TCO2 ARTERIAL: 19.8 MMOL/L
TCO2 ARTERIAL: 22.6 MMOL/L
TOTAL PROTEIN: 5.6 G/DL (ref 6.4–8.2)
VANCOMYCIN RANDOM: 16.1 UG/ML
WBC # BLD: 8 K/UL (ref 4–11)

## 2021-09-12 PROCEDURE — 6370000000 HC RX 637 (ALT 250 FOR IP): Performed by: INTERNAL MEDICINE

## 2021-09-12 PROCEDURE — 99233 SBSQ HOSP IP/OBS HIGH 50: CPT | Performed by: INTERNAL MEDICINE

## 2021-09-12 PROCEDURE — 80076 HEPATIC FUNCTION PANEL: CPT

## 2021-09-12 PROCEDURE — 85025 COMPLETE CBC W/AUTO DIFF WBC: CPT

## 2021-09-12 PROCEDURE — 99291 CRITICAL CARE FIRST HOUR: CPT | Performed by: INTERNAL MEDICINE

## 2021-09-12 PROCEDURE — 2580000003 HC RX 258: Performed by: INTERNAL MEDICINE

## 2021-09-12 PROCEDURE — 2500000003 HC RX 250 WO HCPCS: Performed by: INTERNAL MEDICINE

## 2021-09-12 PROCEDURE — 6360000002 HC RX W HCPCS: Performed by: INTERNAL MEDICINE

## 2021-09-12 PROCEDURE — 84100 ASSAY OF PHOSPHORUS: CPT

## 2021-09-12 PROCEDURE — 80048 BASIC METABOLIC PNL TOTAL CA: CPT

## 2021-09-12 PROCEDURE — 83735 ASSAY OF MAGNESIUM: CPT

## 2021-09-12 PROCEDURE — 82803 BLOOD GASES ANY COMBINATION: CPT

## 2021-09-12 PROCEDURE — 94003 VENT MGMT INPAT SUBQ DAY: CPT

## 2021-09-12 PROCEDURE — 36415 COLL VENOUS BLD VENIPUNCTURE: CPT

## 2021-09-12 PROCEDURE — 2060000000 HC ICU INTERMEDIATE R&B

## 2021-09-12 PROCEDURE — 80202 ASSAY OF VANCOMYCIN: CPT

## 2021-09-12 RX ORDER — POTASSIUM CHLORIDE 7.45 MG/ML
10 INJECTION INTRAVENOUS
Status: COMPLETED | OUTPATIENT
Start: 2021-09-12 | End: 2021-09-12

## 2021-09-12 RX ORDER — POTASSIUM CHLORIDE 7.45 MG/ML
10 INJECTION INTRAVENOUS
Status: DISPENSED | OUTPATIENT
Start: 2021-09-12 | End: 2021-09-12

## 2021-09-12 RX ADMIN — POTASSIUM CHLORIDE 10 MEQ: 7.46 INJECTION, SOLUTION INTRAVENOUS at 17:24

## 2021-09-12 RX ADMIN — BUPROPION HYDROCHLORIDE 450 MG: 150 TABLET, EXTENDED RELEASE ORAL at 09:29

## 2021-09-12 RX ADMIN — CEFEPIME HYDROCHLORIDE 1000 MG: 1 INJECTION, POWDER, FOR SOLUTION INTRAMUSCULAR; INTRAVENOUS at 00:48

## 2021-09-12 RX ADMIN — FAMOTIDINE 20 MG: 10 INJECTION INTRAVENOUS at 09:03

## 2021-09-12 RX ADMIN — CARBOXYMETHYLCELLULOSE SODIUM 1 DROP: 10 GEL OPHTHALMIC at 09:03

## 2021-09-12 RX ADMIN — DEXTROSE MONOHYDRATE 12.5 G: 25 INJECTION, SOLUTION INTRAVENOUS at 23:41

## 2021-09-12 RX ADMIN — MUPIROCIN: 20 OINTMENT TOPICAL at 09:02

## 2021-09-12 RX ADMIN — POTASSIUM CHLORIDE 10 MEQ: 7.46 INJECTION, SOLUTION INTRAVENOUS at 19:35

## 2021-09-12 RX ADMIN — MIDAZOLAM HYDROCHLORIDE 2 MG: 1 INJECTION, SOLUTION INTRAMUSCULAR; INTRAVENOUS at 01:40

## 2021-09-12 RX ADMIN — MUPIROCIN: 20 OINTMENT TOPICAL at 19:46

## 2021-09-12 RX ADMIN — CARIPRAZINE 6 MG: 3 CAPSULE, GELATIN COATED ORAL at 09:03

## 2021-09-12 RX ADMIN — Medication 10 MEQ: at 12:57

## 2021-09-12 RX ADMIN — CEFEPIME HYDROCHLORIDE 1000 MG: 1 INJECTION, POWDER, FOR SOLUTION INTRAMUSCULAR; INTRAVENOUS at 17:23

## 2021-09-12 RX ADMIN — POTASSIUM CHLORIDE 10 MEQ: 7.46 INJECTION, SOLUTION INTRAVENOUS at 16:40

## 2021-09-12 RX ADMIN — POTASSIUM CHLORIDE 10 MEQ: 7.46 INJECTION, SOLUTION INTRAVENOUS at 15:10

## 2021-09-12 RX ADMIN — PROPOFOL 50 MCG/KG/MIN: 10 INJECTION, EMULSION INTRAVENOUS at 03:36

## 2021-09-12 RX ADMIN — VANCOMYCIN HYDROCHLORIDE 1000 MG: 1 INJECTION, POWDER, LYOPHILIZED, FOR SOLUTION INTRAVENOUS at 09:05

## 2021-09-12 RX ADMIN — INSULIN LISPRO 9 UNITS: 100 INJECTION, SOLUTION INTRAVENOUS; SUBCUTANEOUS at 09:14

## 2021-09-12 RX ADMIN — HYDRALAZINE HYDROCHLORIDE 10 MG: 20 INJECTION INTRAMUSCULAR; INTRAVENOUS at 00:54

## 2021-09-12 RX ADMIN — Medication 15 ML: at 09:03

## 2021-09-12 RX ADMIN — DEXTROSE MONOHYDRATE 500 MG: 50 INJECTION, SOLUTION INTRAVENOUS at 12:10

## 2021-09-12 RX ADMIN — INSULIN LISPRO 3 UNITS: 100 INJECTION, SOLUTION INTRAVENOUS; SUBCUTANEOUS at 12:15

## 2021-09-12 RX ADMIN — WHITE PETROLATUM 57.7 %-MINERAL OIL 31.9 % EYE OINTMENT: at 04:55

## 2021-09-12 RX ADMIN — FENTANYL CITRATE 25 MCG: 0.05 INJECTION, SOLUTION INTRAMUSCULAR; INTRAVENOUS at 01:40

## 2021-09-12 RX ADMIN — WHITE PETROLATUM 57.7 %-MINERAL OIL 31.9 % EYE OINTMENT: at 09:03

## 2021-09-12 RX ADMIN — CARBOXYMETHYLCELLULOSE SODIUM 1 DROP: 10 GEL OPHTHALMIC at 00:02

## 2021-09-12 RX ADMIN — HYDRALAZINE HYDROCHLORIDE 10 MG: 20 INJECTION INTRAMUSCULAR; INTRAVENOUS at 19:52

## 2021-09-12 RX ADMIN — Medication 10 MEQ: at 12:11

## 2021-09-12 RX ADMIN — INSULIN LISPRO 6 UNITS: 100 INJECTION, SOLUTION INTRAVENOUS; SUBCUTANEOUS at 00:02

## 2021-09-12 RX ADMIN — Medication 10 MEQ: at 14:06

## 2021-09-12 RX ADMIN — WHITE PETROLATUM 57.7 %-MINERAL OIL 31.9 % EYE OINTMENT: at 00:59

## 2021-09-12 RX ADMIN — CARBOXYMETHYLCELLULOSE SODIUM 1 DROP: 10 GEL OPHTHALMIC at 04:55

## 2021-09-12 RX ADMIN — INSULIN LISPRO 9 UNITS: 100 INJECTION, SOLUTION INTRAVENOUS; SUBCUTANEOUS at 04:56

## 2021-09-12 RX ADMIN — CEFEPIME HYDROCHLORIDE 1000 MG: 1 INJECTION, POWDER, FOR SOLUTION INTRAMUSCULAR; INTRAVENOUS at 09:05

## 2021-09-12 RX ADMIN — PROPOFOL 50 MCG/KG/MIN: 10 INJECTION, EMULSION INTRAVENOUS at 00:20

## 2021-09-12 RX ADMIN — INSULIN GLARGINE 50 UNITS: 100 INJECTION, SOLUTION SUBCUTANEOUS at 09:13

## 2021-09-12 ASSESSMENT — PULMONARY FUNCTION TESTS
PIF_VALUE: 19
PIF_VALUE: 21
PIF_VALUE: 22
PIF_VALUE: 19
PIF_VALUE: 17
PIF_VALUE: 12
PIF_VALUE: 11
PIF_VALUE: 18

## 2021-09-12 ASSESSMENT — PAIN SCALES - GENERAL
PAINLEVEL_OUTOF10: 8
PAINLEVEL_OUTOF10: 0
PAINLEVEL_OUTOF10: 6

## 2021-09-12 ASSESSMENT — PAIN DESCRIPTION - PAIN TYPE: TYPE: ACUTE PAIN

## 2021-09-12 ASSESSMENT — PAIN DESCRIPTION - LOCATION: LOCATION: HEAD;OTHER (COMMENT)

## 2021-09-12 NOTE — PROGRESS NOTES
This note also relates to the following rows which could not be included:  Vt Ordered - Cannot attach notes to unvalidated device data  Rate Set - Cannot attach notes to unvalidated device data  Peak Flow - Cannot attach notes to unvalidated device data  Pressure Support - Cannot attach notes to unvalidated device data  FiO2  - Cannot attach notes to unvalidated device data  SpO2 - Cannot attach notes to unvalidated device data  Sensitivity - Cannot attach notes to unvalidated device data  PEEP/CPAP - Cannot attach notes to unvalidated device data  I Time/ I Time % - Cannot attach notes to unvalidated device data  High Peep/I Pressure - Cannot attach notes to unvalidated device data  Peak Inspiratory Pressure - Cannot attach notes to unvalidated device data  Mean Airway Pressure - Cannot attach notes to unvalidated device data  Rate Measured - Cannot attach notes to unvalidated device data  Vt Exhaled - Cannot attach notes to unvalidated device data  Minute Volume - Cannot attach notes to unvalidated device data  I:E Ratio - Cannot attach notes to unvalidated device data  Pulse - Cannot attach notes to unvalidated device data  Resp - Cannot attach notes to unvalidated device data  High Pressure Alarm - Cannot attach notes to unvalidated device data  Low Minute Volume Alarm - Cannot attach notes to unvalidated device data  High Respiratory Rate - Cannot attach notes to unvalidated device data       09/11/21 2242   Vent Information   Vent Type 840   Vent Mode AC/VC   Humidification Source Heated wire   Humidification Temp 37   Humidification Temp Measured 36.7   Vent Patient Data   Plateau Pressure 13 [id number]   Cough/Sputum   Sputum How Obtained Endotracheal;Suctioned   Cough Productive   Sputum Amount Large   Sputum Color Yellow;Creamy   Tenacity Thick   Breath Sounds   Right Upper Lobe Clear   Right Middle Lobe Clear   Right Lower Lobe Diminished;Clear   Left Upper Lobe Clear   Left Lower Lobe Diminished;Clear Additional Respiratory  Assessments   Position Semi-Vila's   Alarm Settings   Apnea (secs) 20 secs   Low Exhaled Vt  250 mL   Non-Surgical Airway 09/07/21 Endo Tracheal Tube   Placement Date/Time: 09/07/21 1659   Placed By: In ED  Inserted by: Dr Nixon Cobos  Airway Device: Endo Tracheal Tube  Size: 8   Secured at 23 cm   Measured From Lips   Secured Location Left  (moved to left)   Secured By Commercial tube anne   Site Condition Dry

## 2021-09-12 NOTE — PROGRESS NOTES
End of shift note. VSS during this shift, telemetry continues SR. Pt was extubated this morning and continues on 2 liters nasal canula with 97% oxygen saturations. Patient continues to have a barking/stridor cough. He remains NPO.

## 2021-09-12 NOTE — PROGRESS NOTES
09/12/21 0300   Vent Information   Vent Type 840   Vent Mode AC/VC   Vt Ordered 450 mL   Rate Set 24 bmp   Peak Flow 75 L/min   Pressure Support 0 cmH20   FiO2  35 %   SpO2 99 %   SpO2/FiO2 ratio 282.86   Sensitivity 3   PEEP/CPAP 5   I Time/ I Time % 0 s   Humidification Source Heated wire   Humidification Temp 37   Humidification Temp Measured 37.2   Circuit Condensation Drained   Vent Patient Data   High Peep/I Pressure 0   Peak Inspiratory Pressure 17 cmH2O   Mean Airway Pressure 8.9 cmH20   Rate Measured 25 br/min   Vt Exhaled 444 mL   Minute Volume 12 Liters   I:E Ratio 1:2.80   Plateau Pressure 12 [id number]   Cough/Sputum   Sputum How Obtained Endotracheal;Suctioned   Cough Productive   Sputum Amount Small   Sputum Color Creamy   Tenacity Thick   Spontaneous Breathing Trial (SBT) RT Doc   Pulse 117   Breath Sounds   Right Upper Lobe Clear   Right Middle Lobe Clear   Right Lower Lobe Diminished;Clear   Left Upper Lobe Clear   Left Lower Lobe Diminished;Clear   Additional Respiratory  Assessments   Position Semi-Vila's   Alarm Settings   High Pressure Alarm 40 cmH2O   Low Minute Volume Alarm 2.5 L/min   Apnea (secs) 20 secs   High Respiratory Rate 45 br/min   Low Exhaled Vt  250 mL   Non-Surgical Airway 09/07/21 Endo Tracheal Tube   Placement Date/Time: 09/07/21 1659   Placed By: In ED  Inserted by: Dr Carlos Leos  Airway Device: Endo Tracheal Tube  Size: 8   Secured at 23 cm   Measured From Lips   Secured Location Left   Secured By Commercial tube anne   Site Condition Dry Benzoyl Peroxide Counseling: Patient counseled that medicine may cause skin irritation and bleach clothing.  In the event of skin irritation, the patient was advised to reduce the amount of the drug applied or use it less frequently.   The patient verbalized understanding of the proper use and possible adverse effects of benzoyl peroxide.  All of the patient's questions and concerns were addressed.

## 2021-09-12 NOTE — PROGRESS NOTES
Vancomycin Day 6  Pulse dosing  SCr = 2.4  Random vanc level this am at 04:04 = 16.1    Give Vancomycin 1000 mg x 1 dose today  Will recheck random level and labs tomorrow am on 9/13.   Marti Stone, [facility]

## 2021-09-12 NOTE — PROGRESS NOTES
Pulmonary & Critical Care Medicine ICU Progress Note    CC: Acute respiratory failure with hypoxemia, DKA    Events of Last 24 hours: patient tolerating SBT. Invasive Lines:   IV Line: pivs    MV:  9/7  Vent Mode: AC/VC Rate Set: 24 bmp/Vt Ordered: 450 mL/ /FiO2 : 35 %  Recent Labs     09/11/21  1035 09/12/21  0505   PHART 7.449 7.436   [id number] 32.7* 28.7*   PO2ART 68.8* 102.1       IV:   sodium chloride 75 mL/hr at 09/11/21 2212    dextrose      propofol 50 mcg/kg/min (09/12/21 0336)       EXAM:  BP (!) 181/93   Pulse 90   Temp 99.5 °F (37.5 °C) (Bladder)   Resp 24   Ht 5' 9\" (1.753 m)   Wt 193 lb 6.4 oz (87.7 kg)   SpO2 100%   BMI 28.56 kg/m²  on vent   Tmax:99.8F  CVP:      Intake/Output Summary (Last 24 hours) at 9/12/2021 0938  Last data filed at 9/11/2021 2212  Gross per 24 hour   Intake 3602.41 ml   Output 1250 ml   Net 2352.41 ml     Gen: No distress. NCAT, comfortable  Eyes: PERRL. No sclera icterus. No conjunctival injection. ENT: No discharge. Pharynx clear. Neck: Trachea midline. No obvious mass. Resp: No accessory muscle use. No crackles. No wheezes. No rhonchi. No dullness on percussion. CV: Regular rate. Regular rhythm. No murmur or rub. No edema. Skin: Warm and dry. No nodule on exposed extremities. M/S: No cyanosis. No joint deformity. No clubbing.    Neuro: awake, eyes open, moving all extremities; he is following commands    Medications:   vancomycin  1,000 mg IntraVENous Once    insulin glargine  50 Units SubCUTAneous QAM    cariprazine hcl  6 mg Oral Daily    buPROPion  450 mg Oral Daily    topiramate  50 mg Oral Nightly    famotidine (PEPCID) injection  20 mg IntraVENous Daily    insulin lispro  0-18 Units SubCUTAneous Q4H    vancomycin (VANCOCIN) intermittent dosing (placeholder)   Other RX Placeholder    cefepime  1,000 mg IntraVENous Q8H    mupirocin   Nasal BID    azithromycin  500 mg IntraVENous Q24H    chlorhexidine  15 mL Mouth/Throat BID    [Held by provider] enoxaparin  40 mg SubCUTAneous Daily    carboxymethylcellulose PF  1 drop Both Eyes Q4H    And    artificial tears   Both Eyes Q4H     PRN Meds:  glucose, dextrose, glucagon (rDNA), dextrose, hydrALAZINE, acetaminophen, ondansetron, dextrose, dextrose, polyethylene glycol, fentanNYL, midazolam    Results:  CBC:   Recent Labs     09/10/21  0433 09/11/21  0425 09/12/21  0404   WBC 7.0 6.4 8.0   HGB 11.0* 12.7* 10.2*   HCT 33.1* 38.7* 30.7*   MCV 90.8 90.7 90.6   * 150 168     BMP:   Recent Labs     09/10/21  0433 09/11/21  0425 09/12/21  0404    140 135*   K 3.6 3.2* 3.0*   * 106 102   CO2 14* 20* 19*   PHOS 3.5 3.4 2.5   BUN 24* 30* 34*   CREATININE 2.9* 2.6* 2.4*     LIVER PROFILE:   Recent Labs     09/10/21  0433 09/11/21  0425 09/12/21  0404   AST 10* 15 12*   ALT 10 10 8*   BILIDIR <0.2 <0.2 <0.2   BILITOT 0.4 0.5 <0.2   ALKPHOS 120 126 109     PT/INR: No results for input(s): PROTIME, INR in the last 72 hours. APTT: No results for input(s): APTT in the last 72 hours. UA:  Recent Labs     09/10/21  1730   COLORU Yellow   PHUR 5.5   WBCUA 3-5   RBCUA 0-2   MUCUS Rare*   BACTERIA 2+*   CLARITYU SL CLOUDY*   SPECGRAV 1.015   LEUKOCYTESUR Negative   UROBILINOGEN 0.2   BILIRUBINUR Negative   BLOODU Negative   GLUCOSEU >=1000*   AMORPHOUS 1+       Cultures:  9/7 blood- ngtd  9/8 sputum- ngtd  9/7 sars Cov2- neg     Films:  CXR 9/11: Endotracheal tube terminates 3 cm cephalad to the miguelito.  Nasogastric tube  has a normal course, distal side port and tip at the proximal stomach.     The cardiac silhouette is at the upper limits of normal.  Pulmonary vessels  are congested.  There is hazy opacity in the right perihilar region.   Airspace disease from the right base is improved.  No pleural effusion or  pneumothorax is seen.         9/7 sars Cov2- neg      ASSESSMENT:  ·  Acute respiratory failure with hypoxemia  · RLL pneumonia - Healthcare associated pneumonia - probable gram negative, risk for methicillin resistant Staph aureus as well   · Severe diabetic ketoacidosis   · NOVA  · hyponatremia  · Elevated liver enzymes  · Acute metabolic encephalopathy  · Thrombocytopenia -  HIT neg  · hyperglycemia        PLAN:  · Mechanical ventilation per my orders. The ventilator was adjusted by me at the bedside for unstable, life threatening respiratory failure. Try to extubate today  · HOB greater than 30 degrees at all times  · Daily SBT once FIO2<60% and PEEP = 5, patient arousable, hemodynamically stable  · IV Sedation with propofol and fentanyl gtt targeted RASS -2  · ABX: vanc and cefepime D6; D5 azithromycin  · tube feeds per nutrition recs  · SQ insulin - lantus 50 U and HSSI  · ICU care- hold lovenox and bactroban  ·  hydralazine for HTN prn  · Nephrology consult appreciated- IVF per them  ·  home psych meds Breylar, neurontin and wellburin  · D/w mother at bedside    Critical care time spent reviewing labs/films, examining patient, collaborating with other physicians but excluding procedures for life threatening organ failure is 32 minutes.

## 2021-09-12 NOTE — PROGRESS NOTES
Pt is has a stridor cough and voice, but continues on 2 liters and oxygenating well. Dr. Inga Funk is aware. Plans for him to remain NPO and to remain in the ICU.

## 2021-09-12 NOTE — FLOWSHEET NOTE
Attempted to visit with Pt. Pt asleep. Left Spiritual Care note, informing him of our availability for support.     [facility]       09/12/21 1514   Encounter Summary   Services provided to: Patient not available   Referral/Consult From: Rounding   Continue Visiting   (9/12 Pt asleep, left note)   Complexity of Encounter Low   Length of Encounter 15 minutes

## 2021-09-12 NOTE — PROGRESS NOTES
Nephrology Progress Note   [email or web address]      Sub/interval history  Extubated on 9/12  Blood pressure running high      ROS: UTO  PSFH: No visitor    Scheduled Meds:   potassium chloride  10 mEq IntraVENous Q1H    insulin glargine  50 Units SubCUTAneous QAM    cariprazine hcl  6 mg Oral Daily    buPROPion  450 mg Oral Daily    topiramate  50 mg Oral Nightly    famotidine (PEPCID) injection  20 mg IntraVENous Daily    insulin lispro  0-18 Units SubCUTAneous Q4H    vancomycin (VANCOCIN) intermittent dosing (placeholder)   Other RX Placeholder    cefepime  1,000 mg IntraVENous Q8H    mupirocin   Nasal BID    [Held by provider] enoxaparin  40 mg SubCUTAneous Daily     Continuous Infusions:   sodium chloride 75 mL/hr at 09/11/21 2212    dextrose       PRN Meds:.glucose, dextrose, glucagon (rDNA), dextrose, hydrALAZINE, acetaminophen, ondansetron, dextrose, dextrose, polyethylene glycol    Objective/     Vitals:    09/12/21 1200 09/12/21 1300 09/12/21 1400 09/12/21 1500   BP: (!) 172/100 (!) 174/98 (!) 171/95 (!) 190/103   Pulse: 98 103  94   Resp: 20 20 25 17   Temp:       TempSrc:       SpO2: 100% 99% 100% 100%   Weight:       Height:         24HR INTAKE/OUTPUT:      Intake/Output Summary (Last 24 hours) at 9/12/2021 1519  Last data filed at 9/11/2021 2212  Gross per 24 hour   Intake 1681.32 ml   Output 825 ml   Net 856.32 ml     Constitutional:  Alert, awake, no apparent distress  Cardiovascular:  S1, S2 without m/r/g; trace lower extremity edema  Respiratory:  Decreased   Abdomen: +bs, soft, nt    Data/  Recent Labs     09/10/21  0433 09/11/21  0425 09/12/21  0404   WBC 7.0 6.4 8.0   HGB 11.0* 12.7* 10.2*   HCT 33.1* 38.7* 30.7*   MCV 90.8 90.7 90.6   * 150 168     Recent Labs     09/10/21  0433 09/11/21  0425 09/12/21  0404    140 135*   K 3.6 3.2* 3.0*   * 106 102   CO2 14* 20* 19*   GLUCOSE 315* 304* 287*   PHOS 3.5 3.4 2.5   MG 2.10 2.20 2.10   BUN 24* 30* 34*   CREATININE 2. 9* 2.6* 2.4*   LABGLOM 25* 28* 30*   GFRAA 30* 34* 37*     Ua w glycosuria n trace protein ow bland  Jemal 60    Assessment/   - Acute kidney injury - pre-renal/ATN injury in setting of DKA + PNA     - Anion-gap metabolic acidosis in setting of DKA, elevated lactate     - Hyponatremia/Hyperkalemia - secondary to translocation, better with insulin     - Acute hypoxic respiratory failure/PNA - on ventilator     - Acute metabolic encephalopathy        Plan/   -DC IV fluid  -k replaced-total 60 M EQ IV on 9/12  - Home doses of lisinopril and voltaren on hold  - Monitor vancomycin levels with dosing  -Serial renal panel  -daily wts and strict i/o  -renal dose medications   -avoid nephrotoxins      Narseen Lara MD  Office: [phone]  Fax:    Ebenezer Pederson [phone]

## 2021-09-12 NOTE — PROGRESS NOTES
Pt extubated to nasal canula 2 liters with oxygen saturations 100%. Pt has a rj at his side. VSS, telemetry SR. Pt denies present needs, call light is within reach.

## 2021-09-12 NOTE — PROGRESS NOTES
Blood pressure (!) 171/88, pulse 93, temperature 99.6 °F (37.6 °C), temperature source Bladder, resp. rate 24, height 5' 9\" (1.753 m), weight 193 lb 6.4 oz (87.7 kg), SpO2 100 %. Patient currently intubated with ETT at 23LL. Vent settings are 24/450/+35%/5. Patient is tolerating vent at this time. Lung sounds clear. Abdomen soft non tender. BS + x 4 quads. Swelling continues in BUE. Pillows used for positioning and lateral turns on. OG in place. Tube feed continues at goal 55 ml/H  Marie catheter intact draining yellow cloudy urine. Propofol infusing at 50 mcg/kg/min.     Electronically signed by Basilio Yu RN on 9/11/2021 at 11:19 PM

## 2021-09-12 NOTE — PROGRESS NOTES
Internal Medicine ICU Progress Note      Events of Last 24 hours:     47-year-old white male who brought in the hospital with DKA. He has a history of diabetes mellitus type 1, juvenile rheumatoid arthritis, celiac disease, dermatomyositis, hypertension, hyperlipidemia, schizoaffective disorder. He was eve in for altered mental status. Work-up showed DKA. t his pH is 6.8 and he was intubated for severe respiratory distress and acidosis. He had a brief run of V. tach. He had a white cell count of 32,000 and hyperkalemia. He is admitted the ICU. This morning he is sedated on the ventilator. He had. Of agitation last night. He has a low-grade fever. -patient's DKA is resolved. Still requiring ventilatory support. Low-grade fever. Sedated on the vent. Only responds to pain. 9/10-sedation has been decreased. Oxygenation is good. Urine output is great. Creatinine is increased. Plans for spontaneous breathing trial.    -patient is spontaneous breathing trial this morning but is not waking up. When I made rounds patient is opening eyes to verbal commands but is still somewhat sleepy. Sedation is been slowly decreased. - extubated to 3 L. He is hoarse.     Invasive Lines: PIV x 3       MV: Intubated on 2021    Recent Labs     21  0505 21  1155   PHART 7.436 7.457*   KMX0ATP 28.7* 31.4*   PO2ART 102.1 117.1*       MV Settings:  Vent Mode: PS Rate Set: 0 bmp/Vt Ordered: 450 mL/ /FiO2 : 30 %    IV:   dextrose         Vitals:  Temp  Av.6 °F (37.6 °C)  Min: 99.3 °F (37.4 °C)  Max: 99.8 °F (37.7 °C)  Pulse  Av.5  Min: 82  Max: 117  BP  Min: 153/79  Max: 198/98  SpO2  Av.6 %  Min: 99 %  Max: 100 %  FiO2   Av %  Min: 30 %  Max: 35 %  Patient Vitals for the past 4 hrs:   BP Pulse Resp SpO2   21 1500 (!) 190/103 94 17 100 %   21 1400 (!) 171/95 -- 25 100 %   21 1300 (!) 174/98 103 20 99 %       CVP:        Intake/Output Summary (Last 24 hours) at 9/12/2021 1607  Last data filed at 9/11/2021 2212  Gross per 24 hour   Intake 1681.32 ml   Output 825 ml   Net 856.32 ml       EXAM:  General: sick appearing. Extubated. Eyes: PERRL. No sclera icterus. No conjunctiva injected. ENT: No discharge. Intubated  Neck: Trachea midline. Normal thyroid. Resp: No accessory muscle use. Right lower lobe crackles. No wheezing. No rhonchi. No dullness on percussion. CV: Regular rate. Regular rhythm. No mumur or rub. No edema. No JVD. Palpable pedal pulses. GI: Non-tender. Non-distended. No masses. No organmegaly. Normal bowel sounds. No hernia. Skin: Warm and dry. No nodule on exposed extremities. No rash on exposed extremities. Lymph: No cervical LAD. No supraclavicular LAD. M/S: No cyanosis. No joint deformity. No clubbing. Neuro: Opens eyes to verbal commands  Psych: extubated. Still difficult to assess.         Medications:  Scheduled Meds:   potassium chloride  10 mEq IntraVENous Q1H    insulin glargine  50 Units SubCUTAneous QAM    cariprazine hcl  6 mg Oral Daily    buPROPion  450 mg Oral Daily    topiramate  50 mg Oral Nightly    famotidine (PEPCID) injection  20 mg IntraVENous Daily    insulin lispro  0-18 Units SubCUTAneous Q4H    vancomycin (VANCOCIN) intermittent dosing (placeholder)   Other RX Placeholder    cefepime  1,000 mg IntraVENous Q8H    mupirocin   Nasal BID    [Held by provider] enoxaparin  40 mg SubCUTAneous Daily       PRN Meds:  glucose, dextrose, glucagon (rDNA), dextrose, hydrALAZINE, acetaminophen, ondansetron, dextrose, dextrose, polyethylene glycol    Results:  CBC:   Recent Labs     09/10/21  0433 09/11/21  0425 09/12/21  0404   WBC 7.0 6.4 8.0   HGB 11.0* 12.7* 10.2*   HCT 33.1* 38.7* 30.7*   MCV 90.8 90.7 90.6   * 150 168     BMP:   Recent Labs     09/10/21  0433 09/11/21 0425 09/12/21  0404    140 135*   K 3.6 3.2* 3.0*   * 106 102   CO2 14* 20* 19*   PHOS 3.5 3.4 2.5   BUN 24* 30* 34* CREATININE 2.9* 2.6* 2.4*     LIVER PROFILE:   Recent Labs     09/10/21  0433 09/11/21  0425 09/12/21  0404   AST 10* 15 12*   ALT 10 10 8*   BILIDIR <0.2 <0.2 <0.2   BILITOT 0.4 0.5 <0.2   ALKPHOS 120 126 109     Cultures:  Results for Nonda Goodell (MRN [id number]) as of 9/8/2021 14:27   Ref. Range 9/7/2021 17:17   INFLUENZA A Latest Ref Range: NOT DETECTED  NOT DETECTED   INFLUENZA B Latest Ref Range: NOT DETECTED  NOT DETECTED   SARS-CoV-2 RNA, RT PCR Latest Ref Range: NOT DETECTED  NOT DETECTED     Heparin Induced Plt Ab Negative       Films:    XR CHEST PORTABLE   Final Result   Supportive tubing is in normal position. Vascular congestion. Regressing basilar and persisting perihilar right airspace disease, most   likely pulmonary edema. XR ABDOMEN FOR NG/OG/NE TUBE PLACEMENT   Final Result   Nasogastric tube is in normal position, at the proximal stomach. XR CHEST PORTABLE   Final Result   No significant change         XR CHEST PORTABLE   Final Result   Improving but incompletely resolved multifocal right-sided pneumonia. XR CHEST PORTABLE   Final Result   Stable right-sided pulmonary infiltrate. Satisfactory position of endotracheal tube         XR CHEST PORTABLE   Final Result   1. Developing multifocal right-sided pneumonia. 2. NG tube placement as above. Advancement by 10 cm recommended. XR CHEST PORTABLE   Final Result   Negative low lung volume portable chest                Assessment:    Principal Problem:    DKA, type 1, not at goal [facility])  Active Problems:    HTN (hypertension)    Hyperlipidemia    Schizoaffective disorder (HCC)    NOVA (acute kidney injury) ([facility] [address]    HCAP (healthcare-associated pneumonia)    Acute respiratory failure with hypoxia (HCC)    Acute metabolic encephalopathy    Thrombocytopenia (HCC)  Resolved Problems:    * No resolved hospital problems. *           Plan:    #DKA with type 1 diabetes. Admitted to hospital with DKA. Started on IV insulin. DKA protocol. His anion gap is closed. He is off his insulin drip. Started Lantus and sliding scale insulin. Monitor and replace electrolytes as needed. #Acute hypoxemic respiratory failure. Intubated for respiratory distress. Seen by pulmonologist.  Continue mechanical ventilation. Dose of sedation has been cut back. Spontaneous breathing trial.  COVID-19 negative. He was extubated at 11.30 am on 9/12/21. Awake. #NOVA. Creatinine is stable but high urine output is good. Nephrology consulted. No plans for dialysis as good urine output. Fluids stopped today. Replace K    #Right lower lobe pneumonia. Risk for gram-negative and MRSA pneumonia. On IV cefepime and Vanco day 6. Finished Zithromax day #5/ cultures sent. #Hypertension. On lisinopril at home. Hold because of NOVA. Started on as needed hydralazine    #Juvenile rheumatoid arthritis. On Plaquenil. Hold. #History of dermatomyositis. #Thrombocytopenia. Significant drop in platelets. Hold Lovenox. HIT ordered. -HIT antibody negative. Platelets better today    #Acute metabolic encephalopathy at the time of admission. Extubated. #Hold Lovenox due to drop in platelets. SCD for DVT prophylaxis. #Pulmonologist resumed home psychiatric medications. He is on Wellbutrin and Breylar    IMPORTANT: Please note that some portions of this note may have been created using Dragon voice recognition software. Some \"sound-alike\" and totally wrong word substitutions may have taken place due to known inherent limitations of any such software, including this voice recognition software. In spite of efforts to eliminate such errors, some may not have been corrected. So please read the note with this in mind and recognize such mistakes and understand the correct version using the  context.  If there are still uncertainties in the mind of the medical provider reading this note about any aspect of the note, the provider can feel free to contact me. Thanks. All questions and concerns were addressed to the patient/family. Alternatives to my treatment were discussed. The note was completed using EMR. Every effort was made to ensure accuracy; however, inadvertent computerized transcription errors may be present.          Jj Montague MD 4:07 PM 9/12/2021

## 2021-09-12 NOTE — PROGRESS NOTES
Shift assessment is complete. ET/OG are intact. Peripheral IV's x 2 are dry/intact with propofol 50 mcg/hr. Marie intact/draining. Propofol and tube feed turned off for SBT. Bilateral soft wrist restraints continue for safety of airway.

## 2021-09-13 LAB
ALBUMIN SERPL-MCNC: 2.8 G/DL (ref 3.4–5)
ALP BLD-CCNC: 101 U/L (ref 40–129)
ALT SERPL-CCNC: 7 U/L (ref 10–40)
ANION GAP SERPL CALCULATED.3IONS-SCNC: 12 MMOL/L (ref 3–16)
ANISOCYTOSIS: ABNORMAL
AST SERPL-CCNC: 15 U/L (ref 15–37)
BANDED NEUTROPHILS RELATIVE PERCENT: 1 % (ref 0–7)
BASOPHILS ABSOLUTE: 0 K/UL (ref 0–0.2)
BASOPHILS RELATIVE PERCENT: 0 %
BILIRUB SERPL-MCNC: 0.4 MG/DL (ref 0–1)
BILIRUBIN DIRECT: <0.2 MG/DL (ref 0–0.3)
BILIRUBIN, INDIRECT: ABNORMAL MG/DL (ref 0–1)
BUN BLDV-MCNC: 33 MG/DL (ref 7–20)
CALCIUM SERPL-MCNC: 8.5 MG/DL (ref 8.3–10.6)
CHLORIDE BLD-SCNC: 107 MMOL/L (ref 99–110)
CO2: 18 MMOL/L (ref 21–32)
CREAT SERPL-MCNC: 2.3 MG/DL (ref 0.9–1.3)
EOSINOPHILS ABSOLUTE: 0.2 K/UL (ref 0–0.6)
EOSINOPHILS RELATIVE PERCENT: 2 %
GFR AFRICAN AMERICAN: 39
GFR NON-AFRICAN AMERICAN: 32
GLUCOSE BLD-MCNC: 101 MG/DL (ref 70–99)
GLUCOSE BLD-MCNC: 120 MG/DL (ref 70–99)
GLUCOSE BLD-MCNC: 150 MG/DL (ref 70–99)
GLUCOSE BLD-MCNC: 199 MG/DL (ref 70–99)
GLUCOSE BLD-MCNC: 253 MG/DL (ref 70–99)
GLUCOSE BLD-MCNC: 78 MG/DL (ref 70–99)
GLUCOSE BLD-MCNC: 80 MG/DL (ref 70–99)
HCT VFR BLD CALC: 30.2 % (ref 40.5–52.5)
HEMATOLOGY PATH CONSULT: NO
HEMOGLOBIN: 9.8 G/DL (ref 13.5–17.5)
HYPOCHROMIA: ABNORMAL
LYMPHOCYTES ABSOLUTE: 1.2 K/UL (ref 1–5.1)
LYMPHOCYTES RELATIVE PERCENT: 14 %
MAGNESIUM: 2.3 MG/DL (ref 1.8–2.4)
MCH RBC QN AUTO: 29.3 PG (ref 26–34)
MCHC RBC AUTO-ENTMCNC: 32.3 G/DL (ref 31–36)
MCV RBC AUTO: 90.8 FL (ref 80–100)
MONOCYTES ABSOLUTE: 0.7 K/UL (ref 0–1.3)
MONOCYTES RELATIVE PERCENT: 8 %
NEUTROPHILS ABSOLUTE: 6.7 K/UL (ref 1.7–7.7)
NEUTROPHILS RELATIVE PERCENT: 75 %
PDW BLD-RTO: 14 % (ref 12.4–15.4)
PERFORMED ON: ABNORMAL
PERFORMED ON: NORMAL
PHOSPHORUS: 1.8 MG/DL (ref 2.5–4.9)
PLATELET # BLD: 237 K/UL (ref 135–450)
PLATELET SLIDE REVIEW: ADEQUATE
PMV BLD AUTO: 8 FL (ref 5–10.5)
POTASSIUM SERPL-SCNC: 3 MMOL/L (ref 3.5–5.1)
RBC # BLD: 3.32 M/UL (ref 4.2–5.9)
SLIDE REVIEW: ABNORMAL
SODIUM BLD-SCNC: 137 MMOL/L (ref 136–145)
TOTAL PROTEIN: 5.7 G/DL (ref 6.4–8.2)
VANCOMYCIN RANDOM: 18.3 UG/ML
WBC # BLD: 8.8 K/UL (ref 4–11)

## 2021-09-13 PROCEDURE — 99232 SBSQ HOSP IP/OBS MODERATE 35: CPT | Performed by: INTERNAL MEDICINE

## 2021-09-13 PROCEDURE — 2580000003 HC RX 258: Performed by: INTERNAL MEDICINE

## 2021-09-13 PROCEDURE — 97110 THERAPEUTIC EXERCISES: CPT

## 2021-09-13 PROCEDURE — 83735 ASSAY OF MAGNESIUM: CPT

## 2021-09-13 PROCEDURE — 92610 EVALUATE SWALLOWING FUNCTION: CPT

## 2021-09-13 PROCEDURE — 97116 GAIT TRAINING THERAPY: CPT

## 2021-09-13 PROCEDURE — 92526 ORAL FUNCTION THERAPY: CPT

## 2021-09-13 PROCEDURE — 97166 OT EVAL MOD COMPLEX 45 MIN: CPT

## 2021-09-13 PROCEDURE — 6370000000 HC RX 637 (ALT 250 FOR IP): Performed by: INTERNAL MEDICINE

## 2021-09-13 PROCEDURE — 80048 BASIC METABOLIC PNL TOTAL CA: CPT

## 2021-09-13 PROCEDURE — 2500000003 HC RX 250 WO HCPCS: Performed by: INTERNAL MEDICINE

## 2021-09-13 PROCEDURE — 97530 THERAPEUTIC ACTIVITIES: CPT

## 2021-09-13 PROCEDURE — 2060000000 HC ICU INTERMEDIATE R&B

## 2021-09-13 PROCEDURE — 6360000002 HC RX W HCPCS: Performed by: INTERNAL MEDICINE

## 2021-09-13 PROCEDURE — 36415 COLL VENOUS BLD VENIPUNCTURE: CPT

## 2021-09-13 PROCEDURE — 80076 HEPATIC FUNCTION PANEL: CPT

## 2021-09-13 PROCEDURE — 97161 PT EVAL LOW COMPLEX 20 MIN: CPT

## 2021-09-13 PROCEDURE — 84100 ASSAY OF PHOSPHORUS: CPT

## 2021-09-13 PROCEDURE — 80202 ASSAY OF VANCOMYCIN: CPT

## 2021-09-13 PROCEDURE — 85025 COMPLETE CBC W/AUTO DIFF WBC: CPT

## 2021-09-13 RX ORDER — INSULIN DETEMIR 100 [IU]/ML
30 INJECTION, SOLUTION SUBCUTANEOUS 2 TIMES DAILY
Qty: 5 ML | Refills: 0 | Status: CANCELLED
Start: 2021-09-13

## 2021-09-13 RX ORDER — INSULIN GLARGINE 100 [IU]/ML
20 INJECTION, SOLUTION SUBCUTANEOUS 2 TIMES DAILY
Status: DISCONTINUED | OUTPATIENT
Start: 2021-09-13 | End: 2021-09-14 | Stop reason: HOSPADM

## 2021-09-13 RX ORDER — DIMETHICONE, OXYBENZONE, AND PADIMATE O 2; 2.5; 6.6 G/100G; G/100G; G/100G
STICK TOPICAL
Status: DISPENSED
Start: 2021-09-13 | End: 2021-09-13

## 2021-09-13 RX ORDER — POTASSIUM CHLORIDE 7.45 MG/ML
10 INJECTION INTRAVENOUS PRN
Status: DISCONTINUED | OUTPATIENT
Start: 2021-09-13 | End: 2021-09-14 | Stop reason: HOSPADM

## 2021-09-13 RX ADMIN — POTASSIUM CHLORIDE 10 MEQ: 7.46 INJECTION, SOLUTION INTRAVENOUS at 07:12

## 2021-09-13 RX ADMIN — INSULIN GLARGINE 20 UNITS: 100 INJECTION, SOLUTION SUBCUTANEOUS at 10:59

## 2021-09-13 RX ADMIN — CEFTRIAXONE SODIUM 1000 MG: 1 INJECTION, POWDER, FOR SOLUTION INTRAMUSCULAR; INTRAVENOUS at 16:28

## 2021-09-13 RX ADMIN — FAMOTIDINE 20 MG: 10 INJECTION INTRAVENOUS at 08:36

## 2021-09-13 RX ADMIN — POTASSIUM CHLORIDE 10 MEQ: 7.46 INJECTION, SOLUTION INTRAVENOUS at 10:42

## 2021-09-13 RX ADMIN — CEFEPIME HYDROCHLORIDE 1000 MG: 1 INJECTION, POWDER, FOR SOLUTION INTRAMUSCULAR; INTRAVENOUS at 02:04

## 2021-09-13 RX ADMIN — ACETAMINOPHEN 650 MG: 325 TABLET ORAL at 14:06

## 2021-09-13 RX ADMIN — BUPROPION HYDROCHLORIDE 450 MG: 150 TABLET, EXTENDED RELEASE ORAL at 09:01

## 2021-09-13 RX ADMIN — TOPIRAMATE 50 MG: 25 TABLET, FILM COATED ORAL at 20:53

## 2021-09-13 RX ADMIN — POTASSIUM PHOSPHATE, MONOBASIC AND POTASSIUM PHOSPHATE, DIBASIC 30 MMOL: 224; 236 INJECTION, SOLUTION, CONCENTRATE INTRAVENOUS at 10:58

## 2021-09-13 RX ADMIN — CEFEPIME HYDROCHLORIDE 1000 MG: 1 INJECTION, POWDER, FOR SOLUTION INTRAMUSCULAR; INTRAVENOUS at 08:36

## 2021-09-13 RX ADMIN — CARIPRAZINE 6 MG: 3 CAPSULE, GELATIN COATED ORAL at 09:01

## 2021-09-13 RX ADMIN — INSULIN GLARGINE 20 UNITS: 100 INJECTION, SOLUTION SUBCUTANEOUS at 20:57

## 2021-09-13 RX ADMIN — DEXTROSE MONOHYDRATE 12.5 G: 25 INJECTION, SOLUTION INTRAVENOUS at 04:59

## 2021-09-13 RX ADMIN — ACETAMINOPHEN 650 MG: 325 TABLET ORAL at 20:53

## 2021-09-13 ASSESSMENT — PAIN SCALES - GENERAL
PAINLEVEL_OUTOF10: 8
PAINLEVEL_OUTOF10: 7
PAINLEVEL_OUTOF10: 8

## 2021-09-13 NOTE — PROGRESS NOTES
Internal Medicine ICU Progress Note      Events of Last 24 hours:     75-year-old white male who brought in the hospital with DKA. He has a history of diabetes mellitus type 1, juvenile rheumatoid arthritis, celiac disease, dermatomyositis, hypertension, hyperlipidemia, schizoaffective disorder. He was eve in for altered mental status. Work-up showed DKA. t his pH is 6.8 and he was intubated for severe respiratory distress and acidosis. He had a brief run of V. tach. He had a white cell count of 32,000 and hyperkalemia. He is admitted the ICU. This morning he is sedated on the ventilator. He had. Of agitation last night. He has a low-grade fever. -patient's DKA is resolved. Still requiring ventilatory support. Low-grade fever. Sedated on the vent. Only responds to pain. 9/10-sedation has been decreased. Oxygenation is good. Urine output is great. Creatinine is increased. Plans for spontaneous breathing trial.    -patient is spontaneous breathing trial this morning but is not waking up. When I made rounds patient is opening eyes to verbal commands but is still somewhat sleepy. Sedation is been slowly decreased. - extubated to 3 L. He is hoarse.     Invasive Lines: PIV x 3       MV: Intubated on 2021    Recent Labs     21  0505 21  1155   PHART 7.436 7.457*   UTP7YKA 28.7* 31.4*   PO2ART 102.1 117.1*       MV Settings:  Vent Mode: PS Rate Set: 0 bmp/Vt Ordered: 450 mL/ /FiO2 : 30 %    IV:   dextrose         Vitals:  Temp  Av.8 °F (37.1 °C)  Min: 98 °F (36.7 °C)  Max: 99.6 °F (37.6 °C)  Pulse  Av.9  Min: 88  Max: 110  BP  Min: 131/81  Max: 203/92  SpO2  Av.8 %  Min: 94 %  Max: 100 %  FiO2   Av %  Min: 30 %  Max: 35 %  Patient Vitals for the past 4 hrs:   BP Temp Temp src Pulse Resp SpO2   21 0850 (!) 141/80 98.5 °F (36.9 °C) Oral 88 18 94 %       CVP:        Intake/Output Summary (Last 24 hours) at 2021 0913  Last data filed at 9/13/2021 0413  Gross per 24 hour   Intake 2168.66 ml   Output 2250 ml   Net -81.34 ml       EXAM:  General: sick appearing. Extubated. Eyes: PERRL. No sclera icterus. No conjunctiva injected. ENT: No discharge. Intubated  Neck: Trachea midline. Normal thyroid. Resp: No accessory muscle use. Right lower lobe crackles. No wheezing. No rhonchi. No dullness on percussion. CV: Regular rate. Regular rhythm. No mumur or rub. No edema. No JVD. Palpable pedal pulses. GI: Non-tender. Non-distended. No masses. No organmegaly. Normal bowel sounds. No hernia. Skin: Warm and dry. No nodule on exposed extremities. No rash on exposed extremities. Lymph: No cervical LAD. No supraclavicular LAD. M/S: No cyanosis. No joint deformity. No clubbing. Neuro: Opens eyes to verbal commands  Psych: extubated. Still difficult to assess.         Medications:  Scheduled Meds:   medicated lip balm        insulin glargine  50 Units SubCUTAneous QAM    cariprazine hcl  6 mg Oral Daily    buPROPion  450 mg Oral Daily    topiramate  50 mg Oral Nightly    famotidine (PEPCID) injection  20 mg IntraVENous Daily    insulin lispro  0-18 Units SubCUTAneous Q4H    vancomycin (VANCOCIN) intermittent dosing (placeholder)   Other RX Placeholder    cefepime  1,000 mg IntraVENous Q8H    [Held by provider] enoxaparin  40 mg SubCUTAneous Daily       PRN Meds:  potassium chloride, glucose, dextrose, glucagon (rDNA), dextrose, hydrALAZINE, acetaminophen, ondansetron, dextrose, dextrose, polyethylene glycol    Results:  CBC:   Recent Labs     09/11/21 0425 09/12/21 0404 09/13/21 0445   WBC 6.4 8.0 8.8   HGB 12.7* 10.2* 9.8*   HCT 38.7* 30.7* 30.2*   MCV 90.7 90.6 90.8    168 237     BMP:   Recent Labs     09/11/21 0425 09/12/21 0404 09/13/21 0445    135* 137   K 3.2* 3.0* 3.0*    102 107   CO2 20* 19* 18*   PHOS 3.4 2.5 1.8*   BUN 30* 34* 33*   CREATININE 2.6* 2.4* 2.3*     LIVER PROFILE:   Recent Labs 09/11/21  0425 09/12/21  0404 09/13/21  0445   AST 15 12* 15   ALT 10 8* 7*   BILIDIR <0.2 <0.2 <0.2   BILITOT 0.5 <0.2 0.4   ALKPHOS 126 109 101     Cultures:  Results for Tai Monroe (MRN [id number]) as of 9/8/2021 14:27   Ref. Range 9/7/2021 17:17   INFLUENZA A Latest Ref Range: NOT DETECTED  NOT DETECTED   INFLUENZA B Latest Ref Range: NOT DETECTED  NOT DETECTED   SARS-CoV-2 RNA, RT PCR Latest Ref Range: NOT DETECTED  NOT DETECTED     Heparin Induced Plt Ab Negative       Films:    XR CHEST PORTABLE   Final Result   Supportive tubing is in normal position. Vascular congestion. Regressing basilar and persisting perihilar right airspace disease, most   likely pulmonary edema. XR ABDOMEN FOR NG/OG/NE TUBE PLACEMENT   Final Result   Nasogastric tube is in normal position, at the proximal stomach. XR CHEST PORTABLE   Final Result   No significant change         XR CHEST PORTABLE   Final Result   Improving but incompletely resolved multifocal right-sided pneumonia. XR CHEST PORTABLE   Final Result   Stable right-sided pulmonary infiltrate. Satisfactory position of endotracheal tube         XR CHEST PORTABLE   Final Result   1. Developing multifocal right-sided pneumonia. 2. NG tube placement as above. Advancement by 10 cm recommended. XR CHEST PORTABLE   Final Result   Negative low lung volume portable chest                Assessment:    Principal Problem:    DKA, type 1, not at goal [facility])  Active Problems:    HTN (hypertension)    Hyperlipidemia    Schizoaffective disorder (HCC)    NOVA (acute kidney injury) ([facility] [address]    HCAP (healthcare-associated pneumonia)    Acute respiratory failure with hypoxia (HCC)    Acute metabolic encephalopathy    Thrombocytopenia (HCC)  Resolved Problems:    * No resolved hospital problems. *           Plan:    #DKA with type 1 diabetes. Admitted to hospital with DKA. Started on IV insulin. DKA protocol. His anion gap is closed.

## 2021-09-13 NOTE — PROGRESS NOTES
BG 71, pt settled into room. Pt c/o headache. Awaiting new orders. PCA & pt aware of NPO status and q4fs checks. Vitals:    09/12/21 2100   BP: (!) 150/87   Pulse: 101   Resp: 18   Temp: 98.4 °F (36.9 °C)   SpO2: 97%     Bed in lowest position, call light within reach. Pt denies any further needs.

## 2021-09-13 NOTE — PROGRESS NOTES
Spoke with Dr. Dipti Perez regarding patient being discharged, he states okay for patient to go home and have follow up labs this week. Will make Dr. Brayan Alfaro aware.

## 2021-09-13 NOTE — PROGRESS NOTES
Patient resting in bed, AM assessment completed. Lungs decreased. BS+. Marie cath noted. New IV placed TING. IV antibiotic started at this time. Patient denies any needs. Call light in reach. Will continue to monitor.

## 2021-09-13 NOTE — CARE COORDINATION
INTERDISCIPLINARY PLAN OF CARE CONFERENCE    Date/Time: 9/13/2021 11:33 AM  Completed by: Arina Fisher RN, Case Management      Patient Name:  Ken Sutherland  YOB: 1985  Admitting Diagnosis: DKA, type 1, not at goal [facility]) [E10.10]  Diabetic ketoacidosis without coma associated with type 1 diabetes mellitus ([facility] [address] [E10.10]     Admit Date/Time:  9/7/2021  3:48 PM    Chart reviewed. Interdisciplinary team contacted or reviewed plan related to patient progress and discharge plans. Disciplines included Case Management, Nursing, and Dietitian. Current Status:PCU; extubated yesterday now on RA;   PT/OT recommendation for discharge plan of care: NA    Expected D/C Disposition:  Home  Confirmed plan with patient and/or family Yes confirmed with: (name) pt  Met with:pt  Discharge Plan Comments: Chart reviewed. Met with pt and he denies any dc needs including HHC. Pt is IPTA, has all needed diabetes supplies, lives with spouse. CM will no longer follow, please consult us if dc needs should arise.  NN/NF    Home O2 in place on admit: No

## 2021-09-13 NOTE — PROGRESS NOTES
Inpatient Physical Therapy Evaluation and Treatment    Unit: PCU  Date:  9/13/2021  Patient Name:    Asha Crystal  Admitting diagnosis:  DKA, type 1, not at goal [facility]) [E10.10]  Diabetic ketoacidosis without coma associated with type 1 diabetes mellitus ([facility] [address] [E10.10]  Admit Date:  9/7/2021  Precautions/Restrictions/WB Status/ Lines/ Wounds/ Oxygen: Fall risk, Bed/chair alarm, Lines -IV and Telemetry    Treatment Time: 1529 - 1600  Treatment Number:  1   Timed Code Treatment Minutes: 21 minutes  Total Treatment Minutes:  31  minutes    Patient Goals for Therapy: \" Go home \"          Discharge Recommendations: Home 24 hr assist and with home PT   DME needs for discharge: RW       Therapy recommendation for EMS Transport: can transport by wheelchair    Therapy recommendations for staff:   Assist of 1 with use of rolling walker (RW) and gait belt for all transfers and ambulation to/from chair  to/from bathroom    History of Present Illness: H & P as per Zeeshan Chandler MD's note dated 9/7/2021:   38 y.o. male with multiple medical comorbidities presented to the emergency department with elevated blood sugars and altered mental status. Is brought in by EMS altered. In the emergency department he was profoundly acidotic, altered, tachypneic and tachycardic. He was intubated to protect his airway and admitted to the ICU for further care. ASSESSMENT:  Acute respiratory failure hypoxia  Diabetic ketoacidosis type I  Acute kidney injury  Pseudohyponatremia  Juvenile rheumatoid arthritis  Obesity      Home Health S4 Level Recommendation:  Level 3 Safety  AM-PAC Mobility Score    AM-PAC Inpatient Mobility Raw Score : 18  AM-PAC Inpatient Mobility without Stair Climbing Raw Score : 15    Preadmission Environment    Pt.  Lives with family (wife and kids (5and 8years old), wife home and able to assist as needed  Home environment:    mobile home/trailer  Steps to enter first floor: 6 steps to enter and hand rail unilateral on L  Steps to second floor: N/A  Bathroom: tub/shower unit, grab bars and standard height commode  Equipment owned:  and [facility]     Preadmission Status:  Pt. Able to drive: No, walks to most places  Pt Fully independent with ADLs: Yes  Pt. Required assistance from family for: Independent PTA-shares duties with wife  Pt. independent for transfers and gait and walked with No Device  History of falls No   Works as a     Pain   Yes  Location: bilateral LEs  Rating: moderate /10  Pain Medicine Status: Received pain med prior to tx    Cognition    A&O Person and Place, unaware regarding situation and reason for hospital admission. Able to follow 1 step commands    Subjective  Patient lying supine in bed with no family present. Pt agreeable to this PT eval & tx. Upper Extremity ROM/Strength  Please see OT evaluation. Lower Extremity ROM / Strength   AROM WFL: Yes  ROM limitations: N/A    Strength Assessment (measured on a 0-5 scale):  R LE   Quad   4   Ant Tib  4   Hamstring 4   Iliopsoas 4  L LE  Quad   3+   Ant Tib  3+   Hamstring 3+   Iliopsoas 3+    Lower Extremity Sensation    WFL    Lower Extremity Proprioception:   WFL    Coordination and Tone  WFL    Balance  Sitting:  Fair +; SBA  Comments:     Standing: Fair -; CGA  Comments: 3 min    Bed Mobility   Supine to Sit:    Min A   Sit to Supine:   Not Tested  Rolling:   Not Tested  Scooting in sitting: CGA  Scooting in supine:  Not Tested    Transfer Training     Sit to stand:   CGA  Stand to sit:   CGA  Bed to Chair:   CGA with use of gait belt and rolling walker (RW)    Gait gait completed as indicated below  Distance:      40 ft  Deviations (firm surface/linoleum):  decreased vannessa, increased FERNANDA, decreased step length bilaterally and decreased stance time bilaterally  Assistive Device Used:    gait belt and rolling walker (RW)  Level of Assist:    CGA  Comment: Patient was limited in walking distance due to SOB.      Stair acute care setting for therapeutic exercises, bed mobility, transfers, progressive gait training, balance training, and family/patient education. Signature: Kirstin Andrea MS PT, # [id number]    If patient discharges from this facility prior to next visit, this note will serve as the Discharge Summary.

## 2021-09-13 NOTE — PROGRESS NOTES
4 Eyes Skin Assessment     The patient is being assess for   Transfer to New Unit - pt transferred from ICU to PCU on 9/12    I agree that 2 RN's have performed a thorough Head to Toe Skin Assessment on the patient. ALL assessment sites listed below have been assessed. Areas assessed for pressure by both nurses:   [x]   Head, Face, and Ears   [x]   Shoulders, Back, and Chest, Abdomen  [x]   Arms, Elbows, and Hands   [x]   Coccyx, Sacrum, and Ischium  [x]   Legs, Feet, and Heels    Skin in tact - no issues        Skin Assessed Under all Medical Devices by both nurses:  NA              All Mepilex Borders were peeled back and area peeked at by both nurses:  No: NA  Please list where Mepilex Borders are located:  NA              **SHARE this note so that the co-signing nurse is able to place an eSignature**    Co-signer eSignature: {Esignature:[id number]}    Does the Patient have Skin Breakdown related to pressure?   No     (Insert Photo here***)         Shivam Prevention initiated:  NA   Wound Care Orders initiated:  SARINA      WOC nurse consulted for Pressure Injury (Stage 3,4, Unstageable, DTI, NWPT, Complex wounds)and New or Established Ostomies:  SARINA      Primary Nurse eSignature: Electronically signed by Marnie Bower RN on 9/12/21 at 10:01 PM EDT

## 2021-09-13 NOTE — PROGRESS NOTES
Inpatient Occupational Therapy  Evaluation and Treatment    Unit: PCU  Date:  9/13/2021  Patient Name:    Rubén Pinzon  Admitting diagnosis:  DKA, type 1, not at goal [facility]) [E10.10]  Diabetic ketoacidosis without coma associated with type 1 diabetes mellitus ([facility] [address] [E10.10]  Admit Date:  9/7/2021  Precautions/Restrictions/WB Status/ Lines/ Wounds/ Oxygen: Fall risk, Bed/chair alarm, Lines -IV, Telemetry and Continuous pulse oximetry    Treatment Time:  1799-5966  Treatment Number: 1   Timed code treatment minutes 25 minutes   Total Treatment minutes:   35   minutes    Patient Goals for Therapy:  \" go home \"      Discharge Recommendations: Home 24 hr assist and with home OT   DME needs for discharge: Shower Chair       Therapy recommendations for staff:   Assist of 1 with use of rolling walker (RW) and gait belt for all transfers and ambulation to/from [facility]  to/from chair  to/from bathroom    History of Present Illness: per Dr Migdalia Roche H&P 9/7/21:  \"36 y.o. male with multiple medical comorbidities presented to the emergency department with elevated blood sugars and altered mental status. Is brought in by EMS altered. In the emergency department he was profoundly acidotic, altered, tachypneic and tachycardic. He was intubated to protect his airway and admitted to the ICU for further care. \"    Home Health S4 Level Recommendation:  Level 1 Standard  AM-PAC Score:      Preadmission Environment    Pt. Lives with family (wife and kids (5and 8years old)), wife home and able to assist as needed  Home environment:  mobile home/trailer  Steps to enter first floor: 6 steps to enter and hand rail unilateral on L  Steps to second floor: N/A  Bathroom: tub/shower unit, grab bars and standard height commode  Equipment owned:  and [facility]    Preadmission Status:  Pt. Able to drive: No, walks to most places  Pt Fully independent with ADLs: Yes  Pt. Required assistance from family for:  Independent PTA-shares duties with wife  Pt. independent for transfers and gait and walked with No Device  History of falls No   Works as a     Pain  Yes  Rating:moderate  Location:BLEs  Pain Medicine Status: Received pain med prior to tx      Cognition    A&O x4 initially stated \"2001\" for year but then able to correct to \"2021\" with min cues  Able to follow 1 step commands    Subjective  Patient lying supine in bed with no family present. Pt agreeable to this OT eval & tx.      Upper Extremity ROM:    WFL with some chronic limitations in several digits (contractures), elbow flexion and shoulder flexion limited about 15 degrees end range of full ROM    Upper Extremity Strength:    Strength Assessment (measured on a 0-5 scale):   4/5 overall within available range, lacks UE endurance      Upper Extremity Sensation    Impaired-neuropathy in B hands/feet    Upper Extremity Proprioception:  NT    Coordination and Tone  WFL    Balance  Functional Sitting Balance:  WFL  Functional Standing Balance:Impaired - CGA with RW    Bed mobility:    Supine to sit:   CGA  Sit to supine:   Not Tested  Rolling:    Not Tested  Scooting in sitting:  SBA  Scooting to head of bed:   Not Tested    Bridging:   Not Tested    Transfers:    Sit to stand:  CGA and with cues for hand placement  Stand to sit:  CGA  Bed to chair:   CGA and with cues for walker safety/hand placement  Standard toilet: Not Tested  Bed to [facility]:  Not Tested    Dressing:      UE:   Not Tested  LE:    Min A don socks using figure four technique, states that donning socks is always a difficult task for him due to being \"tight\"    Bathing:    UE:  Not Tested  LE:  Not Tested    Eating:   Not Tested    Toileting:  Not Tested    Activity Tolerance   Pt completed therapy session with fatigue with exertion  Supine:  /85  HR 97  SpO2 98% on RA    EOB donning socks:    SpO2 98% on RA    After functional mobility in room:    SpO2 99% on RA    Positioning Needs:   Pt up in chair, alarm set, positioned in proper neutral alignment and pressure relief provided. Call light provided and all needs within reach    Exercise / Activities Initiated: all completed bilaterally unless indicated  Shoulder flex/ext:  x6 (hands clasped and punching up toward ceiling, limited by fatigue)  Arm circles at 90 degrees CW/CCW:  x15    Patient/Family Education:   Role of OT  Recommendations for DC  Energy conservation techniques   Endurance building UE HEP (introduced)    Assessment of Deficits: Pt seen for Occupational therapy evaluation in acute care setting. Pt demonstrated decreased Activity tolerance, ADLs, IADLs, Balance , Safety Awareness and Transfers. Pt functioning below baseline and will likely benefit from skilled occupational therapy services to maximize safety and independence. Goal(s) : To be met in 3 Visits:  1). Bed to toilet/BSC: Modified Independent    To be met in 5 Visits:  1). Supine to/from Sit:  Independent  2). Upper Body Bathing:   Supervision  3). Lower Body Bathing:   Min A   4). Upper Body Dressing:  Supervision  5). Lower Body Dressing:  Min A   6). Pt to demonstrate UE exs x 15 reps with minimal cues    Rehabilitation Potential:  Good for goals listed above. Strengths for achieving goals include: Pt motivated, PLOF, Family Support and Pt cooperative  Barriers to achieving goals include:  Weakness     Plan: To be seen 3-5 x/wk while in acute care setting for therapeutic exercises, bed mobility, transfers, dressing, bathing, family/patient education, ADL/IADL retraining, energy conservation training.      Bebeto Leon OTR/L [id number]            If patient discharges from this facility prior to next visit, this note will serve as the Discharge Summary

## 2021-09-13 NOTE — PROGRESS NOTES
Transfer to [facility] from [address], RN and Imtiaz  performed complete skin assessment on transfer. Upon transferring patient to ordered level of care, patients medications were gathered from the following locations and given to receiving nurse during bedside report:      Lock Box in room :     [x] Yes   [] No   Pyxis Bin:       [x] Yes   [] No      Pyxis Refrigerator:      [x] Yes   [] No   Tube System:       [x] Yes   [] No   LDA's documented:  [x] Yes   [] No          The following paperwork was transferred with patient:    Blue medication book:       [] Yes   [x] No      12 hour chart check:       [x] Yes   [] No   Patient belongings:       [x] Yes   [] No      Continuous pulse ox:   [x] Yes   [] No      [] N/A      Tele monitor number assigned to patient and placed on patient prior to transfer.     CMU Notified at Transfer: [x] Yes   [] No     Name of [address] [place] [address] Staff:  ____________________________       MD notified via Perfect Serve:   [] Yes   [] No    Family notified of transfer:    [x] Yes   [] No    Spoke with:  ___________________________________

## 2021-09-13 NOTE — PROGRESS NOTES
Perfect Serve sent to Vin Beckwith M.D. \"Pt c/o headache 6/10, and is NPO r/t stridor (extubated today). Can we please get something IV for headache please? VSS. /87, ,97% RA.    \"

## 2021-09-13 NOTE — PROGRESS NOTES
Pt's lungs are diminished, stridor in the upper airway, maintaining his saturations well, 99% on RA.

## 2021-09-13 NOTE — PROGRESS NOTES
Patient resting in bed, Marie cath removed. Tylenol given for leg pain. No acute distress noted. Call light in reach. Will continue to monitor.

## 2021-09-13 NOTE — PROGRESS NOTES
Vancomycin Day 7  Pulse dosing  SCr = 2.3  Random vanc level this am at 04:45= 18.3    Hold Vancomycin dose today. Will recheck random level and labs tomorrow am on 9/14.   Will re-dose when level is below 18

## 2021-09-13 NOTE — PROGRESS NOTES
Nephrology Progress Note   [employer]      Sub/interval history  Extubated on 9/12  Patient doing better today  Swallow evaluation planned  Blood pressure better      ROS: No fever, shortness of breath or nausea vomiting  PSFH: No visitor    Scheduled Meds:   medicated lip balm        insulin glargine  20 Units SubCUTAneous BID    potassium phosphate IVPB  30 mmol IntraVENous Once    insulin lispro  0-18 Units SubCUTAneous TID WC    insulin lispro  0-9 Units SubCUTAneous Nightly    cariprazine hcl  6 mg Oral Daily    buPROPion  450 mg Oral Daily    topiramate  50 mg Oral Nightly    famotidine (PEPCID) injection  20 mg IntraVENous Daily    vancomycin (VANCOCIN) intermittent dosing (placeholder)   Other RX Placeholder    cefepime  1,000 mg IntraVENous Q8H    enoxaparin  40 mg SubCUTAneous Daily     Continuous Infusions:   dextrose       PRN Meds:.potassium chloride, glucose, dextrose, glucagon (rDNA), dextrose, hydrALAZINE, acetaminophen, ondansetron, dextrose, dextrose, polyethylene glycol    Objective/     Vitals:    09/12/21 2100 09/13/21 0413 09/13/21 0445 09/13/21 0850   BP: (!) 150/87  131/81 (!) 141/80   Pulse: 101  92 88   Resp: 18  16 18   Temp: 98.4 °F (36.9 °C)  98 °F (36.7 °C) 98.5 °F (36.9 °C)   TempSrc: Oral  Axillary Oral   SpO2: 97%  100% 94%   Weight:  194 lb 4.8 oz (88.1 kg)     Height:         24HR INTAKE/OUTPUT:      Intake/Output Summary (Last 24 hours) at 9/13/2021 1154  Last data filed at 9/13/2021 0413  Gross per 24 hour   Intake 2168.66 ml   Output 2250 ml   Net -81.34 ml     Constitutional:  Alert, awake, no apparent distress  Cardiovascular:  S1, S2 without m/r/g; trace lower extremity edema  Respiratory:  Decreased   Abdomen: +bs, soft, nt    Data/  Recent Labs     09/11/21 0425 09/12/21  0404 09/13/21  0445   WBC 6.4 8.0 8.8   HGB 12.7* 10.2* 9.8*   HCT 38.7* 30.7* 30.2*   MCV 90.7 90.6 90.8    168 237     Recent Labs     09/11/21  0425 09/12/21  9017 09/13/21  0445    135* 137   K 3.2* 3.0* 3.0*    102 107   CO2 20* 19* 18*   GLUCOSE 304* 287* 80   PHOS 3.4 2.5 1.8*   MG 2.20 2.10 2.30   BUN 30* 34* 33*   CREATININE 2.6* 2.4* 2.3*   LABGLOM 28* 30* 32*   GFRAA 34* 37* 39*     Ua w glycosuria n trace protein ow bland  Jemal 60    Assessment/   - Acute kidney injury - pre-renal/ATN injury in setting of DKA + PNA   Creatinine was 0.8 on 6/16/2021. Was 1.4 on 9/7     - Anion-gap metabolic acidosis in setting of DKA, elevated lactate     - Hyponatremia/Hyperkalemia - secondary to translocation, better with insulin     - Acute hypoxic respiratory failure/PNA - on ventilator     - Acute metabolic encephalopathy        Plan/   -Replace K-Phos 30 mmol IV x1  - Home doses of lisinopril and voltaren on hold  - Monitor vancomycin levels with dosing  -Serial renal panel  -daily wts and strict i/o  -renal dose medications   -avoid nephrotoxins    If the patient is able to eat and drink well to void on his own without the Marie catheter, patient can be discharged home with close follow-up with labs in 2 to 3 days time monitor for renal function    Marlene Wen MD  Office: [phone]  Fax:    [phone] [facility]

## 2021-09-13 NOTE — PROGRESS NOTES
Morning k+ was 3.0, perfectserve sent to Dr \"391 pts morning K+ was 3.0, need PRN orders placed please. \"

## 2021-09-13 NOTE — PROGRESS NOTES
Speech Language Pathology  [facility]   CLINICAL BEDSIDE SWALLOW EVALUATION    NAME: Iris Bess  : 1985  MRN: [id number]    ADMISSION DATE: 2021  ADMITTING DIAGNOSIS: has Head ache; RA (rheumatoid arthritis) ([facility] [address] DM (diabetes mellitus) ([facility] [address] Lateral meniscus tear; Medial meniscus tear; Enthesopathy of hip region; Drug overdose; HTN (hypertension); Hyperlipidemia; Schizoaffective disorder ([facility] [address] Altered mental status; Hyperglycemia; Diabetic keto-acidosis ([facility] [address] Vomiting; Abdominal pain; Fibromyalgia; [facility] Left hip revision; DKA, type 2, not at goal [facility]); NOVA (acute kidney injury) ([facility] [address] Metabolic acidosis; Disorder of electrolytes; Chest pain; DKA, type 1, not at goal [facility]); HCAP (healthcare-associated pneumonia); Acute respiratory failure with hypoxia ([facility] [address] Acute metabolic encephalopathy; and Thrombocytopenia (HCC) on their problem list.  ONSET DATE: Px admitted to [address] [place] [address] on 2021    Recent Chest Xray/CT of Chest: (2021)  Impression   Supportive tubing is in normal position.       Vascular congestion.       Regressing basilar and persisting perihilar right airspace disease, most   likely pulmonary edema.        Date of Eval: 2021  Evaluating Therapist: August Meraz    Current Diet level:  Current Diet : NPO  Current Liquid Diet : NPO    Pain:  Pain Assessment  Pain Assessment: 0-10  Pain Level: 6  Pain Type: Acute pain  Pain Location: Head, Other (Comment) (headache)  Response to Pain Intervention: Patient Satisfied  RASS Score: Light Sedation - Patient awakens with eye opening and eye contact, but not sustained    Reason for Referral  Iris Bess was referred for a bedside swallow evaluation to assess the efficiency of his swallow function, identify signs and symptoms of aspiration and make recommendations regarding safe dietary consistencies, effective compensatory strategies, and safe eating environment. Primary Complaint  Patient Complaint: Per admitting MD H&P:  History Of Present Illness:    \"36 y.o. male with multiple medical comorbidities presented to the emergency department with elevated blood sugars and altered mental status. Is brought in by EMS altered. In the emergency department he was profoundly acidotic, altered, tachypneic and tachycardic. He was intubated to protect his airway and admitted to the ICU for further care. \"    Impression  Dysphagia Diagnosis: Swallow function appears grossly intact  Dysphagia Impression : Px does not appear to present symptoms of oropharyngeal dysphagia. Dysphagia Outcome Severity Scale: Level 6: Within functional limits/Modified independence     Medical record review/interview: px was admitted for DKA, was intubated on 09/07, px denied globus sensation, and px mentioned a hoarse voice. 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 Predisposing dysphagia risk factors: N/A  Clinical signs of possible chronic dysphagia: N/A  Precipitating dysphagia risk factors: recent intubation    Vitals/labs:   Temp: 98.9  SpO2: 97-98%  RR: 20/min  BP: 180/91  HR: 93  WBCs: 8.8    Cranial nerve exam:   CN V (trigeminal): ophthalmic, maxillary, and mandibular facial sensation- WNL b/l  CN VII (facial): WNL  CN IX/X (glossopharyngeal/vagus): MPT: Impaired; pitch range: Impaired; vocal quality: hoarse; cough: Strong-perceptually, Non-Productive and Dry  CN XII (hypoglossal): WNL b/l    Laryngeal function exam:   Secretions: Oral mucosa is pink and moist.  Vocal quality: Hoarse  MPT: 3  S/Z ratio: DNT  Pitch range: Impaired  Cough: Strong-perceptually, Non-Productive and Dry    PO trials:   Ice: oral phrase grossly WFL. No clinical s/s of aspiration.   IDDSI 0 (thin):   - 5cc bolus (tsp): DNT  - Cup: no anterior bolus loss , suspect functional A-P bolus transit, swallow timing subjectively appears timely, oral clearance grossly WFL, no clinical s/s of aspiration, no coughing, wet vocal quality, no throat clearing and vitals stable  - Straw: no anterior bolus loss , swallow timing subjectively appears timely, oral clearance grossly WFL, no clinical s/s of aspiration, no coughing, wet vocal quality, no throat clearing and vitals stable  IDDSI 2 (mildly thick): DNT  IDDSI 3 (moderately thick): DNT  IDDSI 4 (puree): suspect functional A-P bolus transit, swallow timing subjectively appears timely, oral clearance grossly WFL, no clinical s/s of aspiration, no coughing, wet vocal quality, no throat clearing and vitals stable  IDDSI 5 (minced and moist): DNT  IDDSI 6 (soft and bite sized): DNT  IDDSI 7 (regular): swallow timing subjectively appears timely, oral clearance grossly WFL, no clinical s/s of aspiration, no coughing, wet vocal quality, no throat clearing and vitals stable  3 oz water: PASS    Clinical signs of oropharyngeal dysphagia likely acute related to intubation. Swallow prognosis is good. Instrumental swallow study is not indicated. Given tolerance to PO at bedside and lack of clinical s/s of aspiration at bedside, pt is for oral diet at this time. Instrumentation:   Is not clinically indicated at this time. Diet recommendation: IDDSI 7 Regular Solids; IDDSI 0 Thin Liquids; Meds whole with thin liquids  Risk management: upright for all intake, stay upright for at least 30 mins after intake, small bites/sips, oral care 2-3x/day to reduce adverse affects in the event of aspiration, alternate bites/sips and slow rate of intake      Treatment Plan  Requires SLP Intervention: Yes  Duration/Frequency of Treatment: 1-2 total follow ups  D/C Recommendations: To be determined  Referral To: ENT    Recommended Diet and Intervention  Diet Solids Recommendation: Regular  Liquid Consistency Recommendation:  Thin  Recommended Form of Meds: Whole with water  Recommendations: Dysphagia treatment  Therapeutic Interventions: Diet tolerance monitoring;Oral care; Patient/Family education    Compensatory Swallowing Strategies  Compensatory Swallowing Strategies: Alternate solids and liquids;Eat/Feed slowly;Upright as possible for all oral intake;Remain upright for 30-45 minutes after meals;Small bites/sips    Treatment/Goals  Short-term Goals  Timeframe for Short-term Goals: 5 days 9/18/2021  Goal 1: The patient will tolerate recommended diet with no clinical s/s of aspiration 5/5  Goal 2: The patient will recall/perform recommended compensatory strategies given min cues    Long-term Goals  Timeframe for Long-term Goals: 7 days 9/20/2021   Goal 1: The patient will tolerate least restrictive diet with no clinical s/s of aspiration or worsening respiratory/pulmonary status    General  Chart Reviewed: Yes  Comments: Px chart reviewed prior to room entry 9/13/2021  Subjective  Subjective: Px seen in room at bedside with RN permission. Behavior/Cognition: Alert; Cooperative;Pleasant mood  Respiratory Status: Room air  Breath Sounds: Clear  O2 Device: None (Room air)  Communication Observation: Functional  Follows Directions: Simple  Dentition: Adequate  Patient Positioning: Upright in bed  Baseline Vocal Quality: Hoarse  Volitional Cough: Strong  Prior Dysphagia History: None reported by patient or listed in chart. Consistencies Administered: Thin;Ice Chips;Dysphagia Pureed (Dysphagia I); Reg solid; Thin - cup; Thin - straw    Vision/Hearing  Vision  Vision: Impaired (Px wearing glasses)  Vision Exceptions: Wears glasses at all times  Hearing  Hearing: Within functional limits    Oral Motor Deficits  Oral/Motor  Oral Motor:  Within functional limits    Oral Phase Dysfunction  Oral Phase  Oral Phase: WFL     Indicators of Pharyngeal Phase Dysfunction   Pharyngeal Phase  Pharyngeal Phase: WFL    Prognosis  Prognosis  Prognosis for safe diet advancement: good  Barriers to reach goals: behavior  Individuals consulted  Consulted and agree with results and recommendations: Patient;RN    Education  Patient Education: SLP educated the patient re: Role of SLP, rationale for completion of assessment, anatomical components of swallow structures as they pertain to airway protection, results of assessment, recommendations and POC  Patient Education Response: Verbalizes understanding;Demonstrated understanding  Safety Devices in place: Yes  Type of devices: Left in bed;Bed alarm in place;Call light within reach;Nurse notified       Therapy Time  SLP Individual Minutes  Time In: 8182  Time Out: [facility]  Minutes: [address]  9/13/2021 2:03 PM   [address]

## 2021-09-13 NOTE — PLAN OF CARE
Problem: Nutrition  Intervention: Swallowing evaluation  Note: SLP completed evaluation. Please refer to notes in EMR. Intervention: Aspiration precautions  Note: SLP completed evaluation. Please refer to notes in EMR.     Aurora Moise M.A., [facility] #[id number]  Speech-Language Pathologist

## 2021-09-13 NOTE — PROGRESS NOTES
Pulmonary Progress Note    CC: Acute respiratory failure with hypoxemia, DKA; he was intubated for severe respiratory distress, had brief run of V. tach    Subjective: feeling some better     Invasive Lines:   IV Line: Peripheral    MV:  9/7/21- 9/12/21  Vent Mode: PS Rate Set: 0 bmp/Vt Ordered: 450 mL/ /FiO2 : 30 %  Recent Labs     09/12/21  0505 09/12/21  1155   PHART 7.436 7.457*   VPS7GAD 28.7* 31.4*   PO2ART 102.1 117.1*       IV:   dextrose         EXAM:  BP (!) 141/80   Pulse 88   Temp 98.5 °F (36.9 °C) (Oral)   Resp 18   Ht 5' 9\" (1.753 m)   Wt 194 lb 4.8 oz (88.1 kg)   SpO2 94%   BMI 28.69 kg/m²  on vent   Tmax:99.8F  CVP:      Intake/Output Summary (Last 24 hours) at 9/13/2021 1436  Last data filed at 9/13/2021 1409  Gross per 24 hour   Intake 2408.66 ml   Output 2875 ml   Net -466.34 ml     Constitutional:  No acute distress. HENT:  Oropharynx is clear and moist.   Neck: No tracheal deviation present. Cardiovascular: Normal heart sounds. No lower extremity edema. Pulmonary/Chest: No wheezes. No rhonchi. LL rales. No decreased breath sounds. No accessory muscle usage or stridor. Musculoskeletal: No cyanosis. No clubbing. Skin: Skin is warm and dry. Psychiatric: Normal mood and affect.   Neurologic: speech fluent, alert and oriented, strength symmetric        Medications:   medicated lip balm        insulin glargine  20 Units SubCUTAneous BID    potassium phosphate IVPB  30 mmol IntraVENous Once    insulin lispro  0-18 Units SubCUTAneous TID WC    insulin lispro  0-9 Units SubCUTAneous Nightly    cariprazine hcl  6 mg Oral Daily    buPROPion  450 mg Oral Daily    topiramate  50 mg Oral Nightly    famotidine (PEPCID) injection  20 mg IntraVENous Daily    vancomycin (VANCOCIN) intermittent dosing (placeholder)   Other RX Placeholder    cefepime  1,000 mg IntraVENous Q8H    enoxaparin  40 mg SubCUTAneous Daily     PRN Meds:  potassium chloride, glucose, dextrose, glucagon (rDNA), dextrose, hydrALAZINE, acetaminophen, ondansetron, dextrose, dextrose, polyethylene glycol    Results:  CBC:   Recent Labs     09/11/21 0425 09/12/21 0404 09/13/21  0445   WBC 6.4 8.0 8.8   HGB 12.7* 10.2* 9.8*   HCT 38.7* 30.7* 30.2*   MCV 90.7 90.6 90.8    168 237     BMP:   Recent Labs     09/11/21 0425 09/12/21 0404 09/13/21  0445    135* 137   K 3.2* 3.0* 3.0*    102 107   CO2 20* 19* 18*   PHOS 3.4 2.5 1.8*   BUN 30* 34* 33*   CREATININE 2.6* 2.4* 2.3*     LIVER PROFILE:   Recent Labs     09/11/21 0425 09/12/21 0404 09/13/21  0445   AST 15 12* 15   ALT 10 8* 7*   BILIDIR <0.2 <0.2 <0.2   BILITOT 0.5 <0.2 0.4   ALKPHOS 126 109 101     PT/INR: No results for input(s): PROTIME, INR in the last 72 hours. APTT: No results for input(s): APTT in the last 72 hours. UA:  Recent Labs     09/10/21  1730   COLORU Yellow   PHUR 5.5   WBCUA 3-5   RBCUA 0-2   MUCUS Rare*   BACTERIA 2+*   CLARITYU SL CLOUDY*   SPECGRAV 1.015   LEUKOCYTESUR Negative   UROBILINOGEN 0.2   BILIRUBINUR Negative   BLOODU Negative   GLUCOSEU >=1000*   AMORPHOUS 1+       Cultures:  9/7/2021 SARS-CoV-2 negative   9/7/2021 blood NG  9/8/2021 tracheal aspirate MSSA    Films:  CXR 9/11/21There is hazy opacity in the right perihilar region. Airspace disease from the right base is improved.  No pleural effusion or pneumothorax is seen.     ASSESSMENT:  · Acute hypoxemic respiratory failure   · RLL pneumonia    · Diabetic ketoacidosis   · NOVA  · Elevated liver enzymes  · Acute metabolic encephalopathy  · Thrombocytopenia -  HIT neg      PLAN:  · ABX: vanc and cefepime D6/7, completed 5 days azithromycin. Narrow spectrum to CTX alone for last day. · Insulin per Internal Medicine   · Nephrology   · Okay from my perspective for d/c planning.   Call with questions

## 2021-09-14 VITALS
BODY MASS INDEX: 28.45 KG/M2 | WEIGHT: 192.1 LBS | RESPIRATION RATE: 17 BRPM | OXYGEN SATURATION: 98 % | SYSTOLIC BLOOD PRESSURE: 142 MMHG | HEIGHT: 69 IN | TEMPERATURE: 98.5 F | DIASTOLIC BLOOD PRESSURE: 95 MMHG | HEART RATE: 76 BPM

## 2021-09-14 LAB
ANION GAP SERPL CALCULATED.3IONS-SCNC: 13 MMOL/L (ref 3–16)
BUN BLDV-MCNC: 33 MG/DL (ref 7–20)
CALCIUM SERPL-MCNC: 8.6 MG/DL (ref 8.3–10.6)
CHLORIDE BLD-SCNC: 105 MMOL/L (ref 99–110)
CO2: 19 MMOL/L (ref 21–32)
CREAT SERPL-MCNC: 2.2 MG/DL (ref 0.9–1.3)
GFR AFRICAN AMERICAN: 41
GFR NON-AFRICAN AMERICAN: 34
GLUCOSE BLD-MCNC: 122 MG/DL (ref 70–99)
GLUCOSE BLD-MCNC: 127 MG/DL (ref 70–99)
GLUCOSE BLD-MCNC: 135 MG/DL (ref 70–99)
GLUCOSE BLD-MCNC: 255 MG/DL (ref 70–99)
PERFORMED ON: ABNORMAL
POTASSIUM SERPL-SCNC: 3 MMOL/L (ref 3.5–5.1)
SODIUM BLD-SCNC: 137 MMOL/L (ref 136–145)

## 2021-09-14 PROCEDURE — 6360000002 HC RX W HCPCS: Performed by: INTERNAL MEDICINE

## 2021-09-14 PROCEDURE — 99239 HOSP IP/OBS DSCHRG MGMT >30: CPT | Performed by: INTERNAL MEDICINE

## 2021-09-14 PROCEDURE — 6370000000 HC RX 637 (ALT 250 FOR IP): Performed by: INTERNAL MEDICINE

## 2021-09-14 PROCEDURE — 2500000003 HC RX 250 WO HCPCS: Performed by: INTERNAL MEDICINE

## 2021-09-14 PROCEDURE — 80048 BASIC METABOLIC PNL TOTAL CA: CPT

## 2021-09-14 PROCEDURE — 36415 COLL VENOUS BLD VENIPUNCTURE: CPT

## 2021-09-14 RX ORDER — POTASSIUM CHLORIDE 20 MEQ/1
20 TABLET, EXTENDED RELEASE ORAL ONCE
Status: DISCONTINUED | OUTPATIENT
Start: 2021-09-14 | End: 2021-09-14 | Stop reason: HOSPADM

## 2021-09-14 RX ORDER — INSULIN DETEMIR 100 [IU]/ML
20 INJECTION, SOLUTION SUBCUTANEOUS 2 TIMES DAILY
Qty: 1 ML | Refills: 0
Start: 2021-09-14 | End: 2021-09-14 | Stop reason: HOSPADM

## 2021-09-14 RX ORDER — INSULIN DETEMIR 100 [IU]/ML
20 INJECTION, SOLUTION SUBCUTANEOUS 2 TIMES DAILY
Qty: 5 PEN | Refills: 0 | Status: ON HOLD | OUTPATIENT
Start: 2021-09-14 | End: 2022-02-24 | Stop reason: SDUPTHER

## 2021-09-14 RX ORDER — INSULIN ASPART 100 [IU]/ML
INJECTION, SOLUTION INTRAVENOUS; SUBCUTANEOUS
Qty: 5 PEN | Refills: 0 | Status: ON HOLD | OUTPATIENT
Start: 2021-09-14 | End: 2022-02-24 | Stop reason: SDUPTHER

## 2021-09-14 RX ORDER — POTASSIUM CHLORIDE 20 MEQ/1
40 TABLET, EXTENDED RELEASE ORAL ONCE
Status: COMPLETED | OUTPATIENT
Start: 2021-09-14 | End: 2021-09-14

## 2021-09-14 RX ADMIN — POTASSIUM CHLORIDE 40 MEQ: 1500 TABLET, EXTENDED RELEASE ORAL at 10:30

## 2021-09-14 RX ADMIN — FAMOTIDINE 20 MG: 10 INJECTION INTRAVENOUS at 08:17

## 2021-09-14 RX ADMIN — INSULIN GLARGINE 20 UNITS: 100 INJECTION, SOLUTION SUBCUTANEOUS at 08:30

## 2021-09-14 RX ADMIN — CARIPRAZINE 6 MG: 3 CAPSULE, GELATIN COATED ORAL at 08:23

## 2021-09-14 RX ADMIN — POTASSIUM CHLORIDE 10 MEQ: 7.46 INJECTION, SOLUTION INTRAVENOUS at 08:32

## 2021-09-14 RX ADMIN — ENOXAPARIN SODIUM 40 MG: 40 INJECTION SUBCUTANEOUS at 08:17

## 2021-09-14 RX ADMIN — BUPROPION HYDROCHLORIDE 450 MG: 150 TABLET, EXTENDED RELEASE ORAL at 08:17

## 2021-09-14 RX ADMIN — ACETAMINOPHEN 650 MG: 325 TABLET ORAL at 08:23

## 2021-09-14 ASSESSMENT — PAIN SCALES - GENERAL
PAINLEVEL_OUTOF10: 0
PAINLEVEL_OUTOF10: 3

## 2021-09-14 NOTE — PROGRESS NOTES
Shift assessment complete, see flowsheets. Medications given, see MAR. Pt requesting PRN for BLE pain. Positioned for comfort. No needs or discomforts stated at this time. Call light in easy reach, bedside table in easy reach, and bed in lowest position.   Dillon Minaya, RN

## 2021-09-14 NOTE — DISCHARGE SUMMARY
Name:  Jermaine Hammond  Room:  /9492-18  MRN:    [id number]    Discharge Summary      This discharge summary is in conjunction with a complete physical exam done on the day of discharge. Attending Physician:      Discharging Physician:       Admit: 9/7/2021  Discharge:      Diagnoses this Admission    Principal Problem:    DKA, type 1, not at goal [facility])  Active Problems:    HTN (hypertension)    Hyperlipidemia    Schizoaffective disorder ([facility] [address]    NOVA (acute kidney injury) ([facility] [address]    HCAP (healthcare-associated pneumonia)    Acute respiratory failure with hypoxia ([facility] [address]    Acute metabolic encephalopathy    Thrombocytopenia ([facility] [address]  Resolved Problems:    * No resolved hospital problems. *          Procedures (Please Review Full Report for Details)      XR CHEST PORTABLE   Final Result   Supportive tubing is in normal position. Vascular congestion. Regressing basilar and persisting perihilar right airspace disease, most   likely pulmonary edema. XR ABDOMEN FOR NG/OG/NE TUBE PLACEMENT   Final Result   Nasogastric tube is in normal position, at the proximal stomach. XR CHEST PORTABLE   Final Result   No significant change         XR CHEST PORTABLE   Final Result   Improving but incompletely resolved multifocal right-sided pneumonia. XR CHEST PORTABLE   Final Result   Stable right-sided pulmonary infiltrate. Satisfactory position of endotracheal tube         XR CHEST PORTABLE   Final Result   1. Developing multifocal right-sided pneumonia. 2. NG tube placement as above. Advancement by 10 cm recommended. XR CHEST PORTABLE   Final Result   Negative low lung volume portable chest               Consults    pulm  nephro    HPI:  38 y.o. male with multiple medical comorbidities presented to the emergency department with elevated blood sugars and altered mental status. Is brought in by EMS altered.   In the emergency department he was profoundly acidotic, altered, tachypneic and tachycardic. He was intubated to protect his airway and admitted to the ICU for further care    Physical Exam at Discharge:    General: alert,oriented   Eyes: PERRL. No sclera icterus. No conjunctiva injected. ENT: No discharge. Intubated  Neck: Trachea midline. Normal thyroid. Resp: No accessory muscle use. Right lower lobe crackles. No wheezing. No rhonchi. No dullness on percussion. CV: Regular rate. Regular rhythm. No mumur or rub. No edema. No JVD. Palpable pedal pulses. GI: Non-tender. Non-distended. No masses. No organmegaly. Normal bowel sounds. No hernia. Skin: Warm and dry. No nodule on exposed extremities. No rash on exposed extremities. Lymph: No cervical LAD. No supraclavicular LAD. M/S: No cyanosis. No joint deformity. No clubbing. Neuro: alert,oriented     Hospital Course  #DKA with type 1 diabetes. Admitted to hospital with DKA. Started on IV insulin. DKA protocol. His anion gap is closed. He is off his insulin drip. Started Lantus and sliding scale insulin. Monitor and replace electrolytes as needed. Started o ncarb control  Sugars stable on lantus and SSI      #Acute hypoxemic respiratory failure. Intubated for respiratory distress. Seen by pulmonologist.  Continue mechanical ventilation. Dose of sedation has been cut back. Spontaneous breathing trial.  COVID-19 negative. He was extubated at 11.30 am on 9/12/21. Awake.now on RA   Swallow eval.     #NOVA. Creatinine is stable but high. urine output is good. Nephrology consulted. No plans for dialysis as good urine output. Fluids stopped today. Replace K    BMP in 1 week      #Right lower lobe pneumonia. Risk for gram-negative and MRSA pneumonia. On IV cefepime and Vanco day 7. Finished Zithromax day #5/ cultures sent. No abx on dc      #Hypertension. On lisinopril at home. Hold because of NOVA. Started on as needed hydralazine     #Juvenile rheumatoid arthritis. On Plaquenil.   Hold.     #History of dermatomyositis.     #Thrombocytopenia. Significant drop in platelets. Hold Lovenox. HIT ordered. -HIT antibody negative. Platelets better today  Restart lovenox      #Acute metabolic encephalopathy at the time of admission. Extubated.      #Hold Lovenox due to drop in platelets. SCD for DVT prophylaxis. Resolved   Restart lovenox          Discharge Medications     Medication List      CHANGE how you take these medications    Levemir 100 UNIT/ML injection vial  Generic drug: insulin detemir  Inject 20 Units into the skin 2 times daily  What changed: how much to take        CONTINUE taking these medications    Crestor 10 MG tablet  Generic drug: rosuvastatin     hydroxychloroquine 200 MG tablet  Commonly known as: PLAQUENIL     NovoLOG FlexPen 100 UNIT/ML injection pen  Generic drug: insulin aspart     pantoprazole sodium 40 MG Pack packet  Commonly known as: PROTONIX     pregabalin 150 MG capsule  Commonly known as: LYRICA     topiramate 50 MG tablet  Commonly known as: TOPAMAX     Vraylar 6 MG Caps capsule  Generic drug: Cariprazine HCl     * Wellbutrin  MG extended release tablet  Generic drug: buPROPion     * buPROPion 150 MG extended release tablet  Commonly known as: WELLBUTRIN XL         * This list has 2 medication(s) that are the same as other medications prescribed for you. Read the directions carefully, and ask your doctor or other care provider to review them with you.             STOP taking these medications    diclofenac 75 MG EC tablet  Commonly known as: VOLTAREN     lisinopril 10 MG tablet  Commonly known as: PRINIVIL;ZESTRIL     ondansetron 4 MG tablet  Commonly known as: Zofran     tiZANidine 4 MG tablet  Commonly known as: Thuy Dee           Where to Get Your Medications      Information about where to get these medications is not yet available    Ask your nurse or doctor about these medications  · Levemir 100 UNIT/ML injection vial         Ot/pt     Discharge Condition/Location: Stable to home     Follow Up: Follow up with PCP.         Eugene Oakley MD 9/14/2021 9:26 AM

## 2021-09-14 NOTE — PLAN OF CARE

## 2021-09-15 NOTE — PROGRESS NOTES
DC instructions have been reviewed with patient and family member with a verbal understanding. Patient requested medication refill, Dr. Jahaira Souza e-scribed them to [facility]. IV removed with no complications, telemetry monitor removed. Patient dressed and stated he had all of his belongings.

## 2021-09-23 ENCOUNTER — HOSPITAL ENCOUNTER (OUTPATIENT)
Age: 36
Discharge: HOME | End: 2021-09-23
Payer: COMMERCIAL

## 2021-09-23 LAB
ALBUMIN SERPL-MCNC: 4.1 G/DL (ref 3.4–5)
ANION GAP SERPL CALCULATED.3IONS-SCNC: 11 MMOL/L (ref 3–16)
BUN BLDV-MCNC: 19 MG/DL (ref 7–20)
CALCIUM SERPL-MCNC: 9.8 MG/DL (ref 8.3–10.6)
CHLORIDE BLD-SCNC: 97 MMOL/L (ref 99–110)
CO2: 27 MMOL/L (ref 21–32)
CREAT SERPL-MCNC: 1.8 MG/DL (ref 0.9–1.3)
GFR AFRICAN AMERICAN: 51
GFR NON-AFRICAN AMERICAN: 42
GLUCOSE BLD-MCNC: 214 MG/DL (ref 70–99)
PHOSPHORUS: 4.7 MG/DL (ref 2.5–4.9)
POTASSIUM SERPL-SCNC: 4.4 MMOL/L (ref 3.5–5.1)
SODIUM BLD-SCNC: 135 MMOL/L (ref 136–145)

## 2021-09-23 PROCEDURE — 80069 RENAL FUNCTION PANEL: CPT

## 2021-09-23 PROCEDURE — 36415 COLL VENOUS BLD VENIPUNCTURE: CPT

## 2022-02-21 ENCOUNTER — HOSPITAL ENCOUNTER (INPATIENT)
Age: 37
LOS: 3 days | Discharge: HOME | DRG: 637 | End: 2022-02-24
Attending: EMERGENCY MEDICINE | Admitting: INTERNAL MEDICINE
Payer: MEDICARE

## 2022-02-21 ENCOUNTER — APPOINTMENT (OUTPATIENT)
Dept: GENERAL RADIOLOGY | Age: 37
DRG: 637 | End: 2022-02-21
Payer: MEDICARE

## 2022-02-21 ENCOUNTER — APPOINTMENT (OUTPATIENT)
Dept: CT IMAGING | Age: 37
DRG: 637 | End: 2022-02-21
Payer: MEDICARE

## 2022-02-21 DIAGNOSIS — E87.20 LACTIC ACID ACIDOSIS: ICD-10-CM

## 2022-02-21 DIAGNOSIS — E10.10 DIABETIC KETOACIDOSIS WITHOUT COMA ASSOCIATED WITH TYPE 1 DIABETES MELLITUS (HCC): Primary | ICD-10-CM

## 2022-02-21 LAB
A/G RATIO: 1.4 (ref 1.1–2.2)
ALBUMIN SERPL-MCNC: 5.3 G/DL (ref 3.4–5)
ALP BLD-CCNC: 173 U/L (ref 40–129)
ALT SERPL-CCNC: 15 U/L (ref 10–40)
AMORPHOUS: ABNORMAL /HPF
ANION GAP SERPL CALCULATED.3IONS-SCNC: 19 MMOL/L (ref 3–16)
ANION GAP SERPL CALCULATED.3IONS-SCNC: 25 MMOL/L (ref 3–16)
ANION GAP SERPL CALCULATED.3IONS-SCNC: 41 MMOL/L (ref 3–16)
AST SERPL-CCNC: 9 U/L (ref 15–37)
BACTERIA: ABNORMAL /HPF
BANDED NEUTROPHILS RELATIVE PERCENT: 2 % (ref 0–7)
BASE EXCESS VENOUS: -22.8 MMOL/L (ref -3–3)
BASOPHILS ABSOLUTE: 0 K/UL (ref 0–0.2)
BASOPHILS RELATIVE PERCENT: 0 %
BETA-HYDROXYBUTYRATE: >8 MMOL/L (ref 0–0.27)
BILIRUB SERPL-MCNC: 0.3 MG/DL (ref 0–1)
BILIRUBIN URINE: ABNORMAL
BLOOD, URINE: ABNORMAL
BUN BLDV-MCNC: 24 MG/DL (ref 7–20)
BUN BLDV-MCNC: 27 MG/DL (ref 7–20)
BUN BLDV-MCNC: 32 MG/DL (ref 7–20)
CALCIUM SERPL-MCNC: 8.8 MG/DL (ref 8.3–10.6)
CALCIUM SERPL-MCNC: 8.9 MG/DL (ref 8.3–10.6)
CALCIUM SERPL-MCNC: 9.9 MG/DL (ref 8.3–10.6)
CARBOXYHEMOGLOBIN: 0.9 % (ref 0–1.5)
CHLORIDE BLD-SCNC: 102 MMOL/L (ref 99–110)
CHLORIDE BLD-SCNC: 81 MMOL/L (ref 99–110)
CHLORIDE BLD-SCNC: 98 MMOL/L (ref 99–110)
CHP ED QC CHECK: NORMAL
CLARITY: CLEAR
CO2: 10 MMOL/L (ref 21–32)
CO2: 12 MMOL/L (ref 21–32)
CO2: 5 MMOL/L (ref 21–32)
COLOR: YELLOW
CREAT SERPL-MCNC: 1.3 MG/DL (ref 0.9–1.3)
CREAT SERPL-MCNC: 1.7 MG/DL (ref 0.9–1.3)
CREAT SERPL-MCNC: 2.2 MG/DL (ref 0.9–1.3)
EKG ATRIAL RATE: 138 BPM
EKG DIAGNOSIS: NORMAL
EKG P AXIS: 69 DEGREES
EKG P-R INTERVAL: 126 MS
EKG Q-T INTERVAL: 284 MS
EKG QRS DURATION: 68 MS
EKG QTC CALCULATION (BAZETT): 430 MS
EKG R AXIS: 75 DEGREES
EKG T AXIS: 63 DEGREES
EKG VENTRICULAR RATE: 138 BPM
EOSINOPHILS ABSOLUTE: 0 K/UL (ref 0–0.6)
EOSINOPHILS RELATIVE PERCENT: 0 %
GFR AFRICAN AMERICAN: 41
GFR AFRICAN AMERICAN: 55
GFR AFRICAN AMERICAN: >60
GFR NON-AFRICAN AMERICAN: 34
GFR NON-AFRICAN AMERICAN: 45
GFR NON-AFRICAN AMERICAN: >60
GLUCOSE BLD-MCNC: 154 MG/DL (ref 70–99)
GLUCOSE BLD-MCNC: 155 MG/DL (ref 70–99)
GLUCOSE BLD-MCNC: 161 MG/DL (ref 70–99)
GLUCOSE BLD-MCNC: 169 MG/DL (ref 70–99)
GLUCOSE BLD-MCNC: 178 MG/DL (ref 70–99)
GLUCOSE BLD-MCNC: 183 MG/DL (ref 70–99)
GLUCOSE BLD-MCNC: 189 MG/DL (ref 70–99)
GLUCOSE BLD-MCNC: 331 MG/DL (ref 70–99)
GLUCOSE BLD-MCNC: 847 MG/DL (ref 70–99)
GLUCOSE BLD-MCNC: >600 MG/DL (ref 70–99)
GLUCOSE URINE: >=1000 MG/DL
HCO3 VENOUS: 6.1 MMOL/L (ref 23–29)
HCT VFR BLD CALC: 51.9 % (ref 40.5–52.5)
HEMOGLOBIN: 15.4 G/DL (ref 13.5–17.5)
INFLUENZA A: NORMAL
INFLUENZA B: NOT DETECTED
KETONES, URINE: >=80 MG/DL
LACTIC ACID, SEPSIS: 1.7 MMOL/L (ref 0.4–1.9)
LACTIC ACID, SEPSIS: 4.9 MMOL/L (ref 0.4–1.9)
LEUKOCYTE ESTERASE, URINE: NEGATIVE
LIPASE: 58 U/L (ref 13–60)
LYMPHOCYTES ABSOLUTE: 2.2 K/UL (ref 1–5.1)
LYMPHOCYTES RELATIVE PERCENT: 9 %
MAGNESIUM: 2 MG/DL (ref 1.8–2.4)
MCH RBC QN AUTO: 27.7 PG (ref 26–34)
MCHC RBC AUTO-ENTMCNC: 29.7 G/DL (ref 31–36)
MCV RBC AUTO: 93.4 FL (ref 80–100)
METHEMOGLOBIN VENOUS: 0.1 %
MICROSCOPIC EXAMINATION: YES
MONOCYTES ABSOLUTE: 0.7 K/UL (ref 0–1.3)
MONOCYTES RELATIVE PERCENT: 3 %
NEUTROPHILS ABSOLUTE: 21.3 K/UL (ref 1.7–7.7)
NEUTROPHILS RELATIVE PERCENT: 86 %
NITRITE, URINE: NEGATIVE
O2 CONTENT, VEN: 20 VOL %
O2 SAT, VEN: 78 %
O2 THERAPY: ABNORMAL
PCO2, VEN: 22.5 MMHG (ref 40–50)
PDW BLD-RTO: 15.4 % (ref 12.4–15.4)
PERFORMED ON: ABNORMAL
PH UA: 5.5 (ref 5–8)
PH VENOUS: 7.05 (ref 7.35–7.45)
PHOSPHORUS: 1.3 MG/DL (ref 2.5–4.9)
PLATELET # BLD: 514 K/UL (ref 135–450)
PLATELET SLIDE REVIEW: ABNORMAL
PMV BLD AUTO: 8.8 FL (ref 5–10.5)
PO2, VEN: 58.7 MMHG (ref 25–40)
POTASSIUM REFLEX MAGNESIUM: 4.3 MMOL/L (ref 3.5–5.1)
POTASSIUM REFLEX MAGNESIUM: 5.7 MMOL/L (ref 3.5–5.1)
POTASSIUM SERPL-SCNC: 4 MMOL/L (ref 3.5–5.1)
PROCALCITONIN: 1.13 NG/ML (ref 0–0.15)
PROTEIN UA: 100 MG/DL
RBC # BLD: 5.56 M/UL (ref 4.2–5.9)
RBC UA: ABNORMAL /HPF (ref 0–4)
SARS-COV-2 RNA, RT PCR: NOT DETECTED
SLIDE REVIEW: ABNORMAL
SODIUM BLD-SCNC: 127 MMOL/L (ref 136–145)
SODIUM BLD-SCNC: 133 MMOL/L (ref 136–145)
SODIUM BLD-SCNC: 133 MMOL/L (ref 136–145)
SPECIFIC GRAVITY UA: 1.02 (ref 1–1.03)
TCO2 CALC VENOUS: 7 MMOL/L
TOTAL PROTEIN: 9.2 G/DL (ref 6.4–8.2)
TROPONIN: <0.01 NG/ML
URINE REFLEX TO CULTURE: ABNORMAL
UROBILINOGEN, URINE: 0.2 E.U./DL
WBC # BLD: 24.2 K/UL (ref 4–11)
WBC UA: ABNORMAL /HPF (ref 0–5)

## 2022-02-21 PROCEDURE — 2000000000 HC ICU R&B

## 2022-02-21 PROCEDURE — 81001 URINALYSIS AUTO W/SCOPE: CPT

## 2022-02-21 PROCEDURE — 84145 PROCALCITONIN (PCT): CPT

## 2022-02-21 PROCEDURE — 83735 ASSAY OF MAGNESIUM: CPT

## 2022-02-21 PROCEDURE — 96365 THER/PROPH/DIAG IV INF INIT: CPT

## 2022-02-21 PROCEDURE — 93010 ELECTROCARDIOGRAM REPORT: CPT | Performed by: INTERNAL MEDICINE

## 2022-02-21 PROCEDURE — 2580000003 HC RX 258: Performed by: EMERGENCY MEDICINE

## 2022-02-21 PROCEDURE — 36415 COLL VENOUS BLD VENIPUNCTURE: CPT

## 2022-02-21 PROCEDURE — U0005 INFEC AGEN DETEC AMPLI PROBE: HCPCS

## 2022-02-21 PROCEDURE — 99285 EMERGENCY DEPT VISIT HI MDM: CPT

## 2022-02-21 PROCEDURE — 71045 X-RAY EXAM CHEST 1 VIEW: CPT

## 2022-02-21 PROCEDURE — U0003 INFECTIOUS AGENT DETECTION BY NUCLEIC ACID (DNA OR RNA); SEVERE ACUTE RESPIRATORY SYNDROME CORONAVIRUS 2 (SARS-COV-2) (CORONAVIRUS DISEASE [COVID-19]), AMPLIFIED PROBE TECHNIQUE, MAKING USE OF HIGH THROUGHPUT TECHNOLOGIES AS DESCRIBED BY CMS-2020-01-R: HCPCS

## 2022-02-21 PROCEDURE — 83690 ASSAY OF LIPASE: CPT

## 2022-02-21 PROCEDURE — C9113 INJ PANTOPRAZOLE SODIUM, VIA: HCPCS | Performed by: INTERNAL MEDICINE

## 2022-02-21 PROCEDURE — 82010 KETONE BODYS QUAN: CPT

## 2022-02-21 PROCEDURE — 6370000000 HC RX 637 (ALT 250 FOR IP): Performed by: INTERNAL MEDICINE

## 2022-02-21 PROCEDURE — 85025 COMPLETE CBC W/AUTO DIFF WBC: CPT

## 2022-02-21 PROCEDURE — 74176 CT ABD & PELVIS W/O CONTRAST: CPT

## 2022-02-21 PROCEDURE — 87040 BLOOD CULTURE FOR BACTERIA: CPT

## 2022-02-21 PROCEDURE — 96361 HYDRATE IV INFUSION ADD-ON: CPT

## 2022-02-21 PROCEDURE — 2580000003 HC RX 258

## 2022-02-21 PROCEDURE — 2500000003 HC RX 250 WO HCPCS: Performed by: INTERNAL MEDICINE

## 2022-02-21 PROCEDURE — 6360000002 HC RX W HCPCS

## 2022-02-21 PROCEDURE — 84484 ASSAY OF TROPONIN QUANT: CPT

## 2022-02-21 PROCEDURE — 80053 COMPREHEN METABOLIC PANEL: CPT

## 2022-02-21 PROCEDURE — 99223 1ST HOSP IP/OBS HIGH 75: CPT | Performed by: INTERNAL MEDICINE

## 2022-02-21 PROCEDURE — 82803 BLOOD GASES ANY COMBINATION: CPT

## 2022-02-21 PROCEDURE — 87636 SARSCOV2 & INF A&B AMP PRB: CPT

## 2022-02-21 PROCEDURE — 6370000000 HC RX 637 (ALT 250 FOR IP)

## 2022-02-21 PROCEDURE — 83605 ASSAY OF LACTIC ACID: CPT

## 2022-02-21 PROCEDURE — 93005 ELECTROCARDIOGRAM TRACING: CPT | Performed by: EMERGENCY MEDICINE

## 2022-02-21 PROCEDURE — 84100 ASSAY OF PHOSPHORUS: CPT

## 2022-02-21 PROCEDURE — 6360000002 HC RX W HCPCS: Performed by: INTERNAL MEDICINE

## 2022-02-21 PROCEDURE — 6370000000 HC RX 637 (ALT 250 FOR IP): Performed by: EMERGENCY MEDICINE

## 2022-02-21 PROCEDURE — 6360000002 HC RX W HCPCS: Performed by: EMERGENCY MEDICINE

## 2022-02-21 RX ORDER — BUPROPION HYDROCHLORIDE 150 MG/1
300 TABLET ORAL EVERY MORNING
Status: DISCONTINUED | OUTPATIENT
Start: 2022-02-22 | End: 2022-02-24 | Stop reason: HOSPADM

## 2022-02-21 RX ORDER — MAGNESIUM SULFATE 1 G/100ML
1000 INJECTION INTRAVENOUS PRN
Status: DISCONTINUED | OUTPATIENT
Start: 2022-02-21 | End: 2022-02-21

## 2022-02-21 RX ORDER — MAGNESIUM SULFATE 1 G/100ML
1000 INJECTION INTRAVENOUS PRN
Status: DISCONTINUED | OUTPATIENT
Start: 2022-02-21 | End: 2022-02-24 | Stop reason: HOSPADM

## 2022-02-21 RX ORDER — BUPROPION HYDROCHLORIDE 150 MG/1
150 TABLET ORAL EVERY MORNING
Status: DISCONTINUED | OUTPATIENT
Start: 2022-02-22 | End: 2022-02-22

## 2022-02-21 RX ORDER — HYDROXYCHLOROQUINE SULFATE 200 MG/1
200 TABLET, FILM COATED ORAL 2 TIMES DAILY
Status: DISCONTINUED | OUTPATIENT
Start: 2022-02-21 | End: 2022-02-24 | Stop reason: HOSPADM

## 2022-02-21 RX ORDER — POTASSIUM CHLORIDE 7.45 MG/ML
10 INJECTION INTRAVENOUS PRN
Status: DISCONTINUED | OUTPATIENT
Start: 2022-02-21 | End: 2022-02-21

## 2022-02-21 RX ORDER — DEXTROSE MONOHYDRATE 25 G/50ML
12.5 INJECTION, SOLUTION INTRAVENOUS PRN
Status: DISCONTINUED | OUTPATIENT
Start: 2022-02-21 | End: 2022-02-21 | Stop reason: ALTCHOICE

## 2022-02-21 RX ORDER — SODIUM CHLORIDE 0.9 % (FLUSH) 0.9 %
10 SYRINGE (ML) INJECTION EVERY 12 HOURS SCHEDULED
Status: DISCONTINUED | OUTPATIENT
Start: 2022-02-21 | End: 2022-02-24 | Stop reason: HOSPADM

## 2022-02-21 RX ORDER — ACETAMINOPHEN 650 MG/1
650 SUPPOSITORY RECTAL EVERY 6 HOURS PRN
Status: DISCONTINUED | OUTPATIENT
Start: 2022-02-21 | End: 2022-02-24 | Stop reason: HOSPADM

## 2022-02-21 RX ORDER — DEXTROSE AND SODIUM CHLORIDE 5; .45 G/100ML; G/100ML
INJECTION, SOLUTION INTRAVENOUS CONTINUOUS PRN
Status: DISCONTINUED | OUTPATIENT
Start: 2022-02-21 | End: 2022-02-22

## 2022-02-21 RX ORDER — ONDANSETRON 2 MG/ML
4 INJECTION INTRAMUSCULAR; INTRAVENOUS EVERY 6 HOURS PRN
Status: DISCONTINUED | OUTPATIENT
Start: 2022-02-21 | End: 2022-02-24 | Stop reason: HOSPADM

## 2022-02-21 RX ORDER — ONDANSETRON 2 MG/ML
INJECTION INTRAMUSCULAR; INTRAVENOUS
Status: COMPLETED
Start: 2022-02-21 | End: 2022-02-21

## 2022-02-21 RX ORDER — SODIUM CHLORIDE 9 MG/ML
INJECTION, SOLUTION INTRAVENOUS CONTINUOUS
Status: DISCONTINUED | OUTPATIENT
Start: 2022-02-21 | End: 2022-02-21

## 2022-02-21 RX ORDER — FAMOTIDINE 20 MG/1
20 TABLET, FILM COATED ORAL 2 TIMES DAILY
Status: DISCONTINUED | OUTPATIENT
Start: 2022-02-21 | End: 2022-02-24 | Stop reason: HOSPADM

## 2022-02-21 RX ORDER — LIDOCAINE HYDROCHLORIDE 20 MG/ML
15 SOLUTION OROPHARYNGEAL ONCE
Status: COMPLETED | OUTPATIENT
Start: 2022-02-21 | End: 2022-02-21

## 2022-02-21 RX ORDER — PANTOPRAZOLE SODIUM 40 MG/10ML
40 INJECTION, POWDER, LYOPHILIZED, FOR SOLUTION INTRAVENOUS DAILY
Status: DISCONTINUED | OUTPATIENT
Start: 2022-02-21 | End: 2022-02-24 | Stop reason: HOSPADM

## 2022-02-21 RX ORDER — 0.9 % SODIUM CHLORIDE 0.9 %
30 INTRAVENOUS SOLUTION INTRAVENOUS ONCE
Status: COMPLETED | OUTPATIENT
Start: 2022-02-21 | End: 2022-02-21

## 2022-02-21 RX ORDER — SODIUM CHLORIDE 9 MG/ML
10 INJECTION INTRAVENOUS DAILY
Status: DISCONTINUED | OUTPATIENT
Start: 2022-02-21 | End: 2022-02-24 | Stop reason: HOSPADM

## 2022-02-21 RX ORDER — DEXTROSE AND SODIUM CHLORIDE 5; .45 G/100ML; G/100ML
INJECTION, SOLUTION INTRAVENOUS CONTINUOUS PRN
Status: DISCONTINUED | OUTPATIENT
Start: 2022-02-21 | End: 2022-02-21

## 2022-02-21 RX ORDER — ROSUVASTATIN CALCIUM 10 MG/1
10 TABLET, COATED ORAL DAILY
Status: DISCONTINUED | OUTPATIENT
Start: 2022-02-22 | End: 2022-02-24 | Stop reason: HOSPADM

## 2022-02-21 RX ORDER — SODIUM CHLORIDE 9 MG/ML
25 INJECTION, SOLUTION INTRAVENOUS PRN
Status: DISCONTINUED | OUTPATIENT
Start: 2022-02-21 | End: 2022-02-24 | Stop reason: HOSPADM

## 2022-02-21 RX ORDER — BENZONATATE 100 MG/1
200 CAPSULE ORAL 3 TIMES DAILY
Status: DISCONTINUED | OUTPATIENT
Start: 2022-02-21 | End: 2022-02-24 | Stop reason: HOSPADM

## 2022-02-21 RX ORDER — SODIUM CHLORIDE 450 MG/100ML
INJECTION, SOLUTION INTRAVENOUS CONTINUOUS
Status: DISCONTINUED | OUTPATIENT
Start: 2022-02-21 | End: 2022-02-22

## 2022-02-21 RX ORDER — POTASSIUM CHLORIDE 7.45 MG/ML
10 INJECTION INTRAVENOUS PRN
Status: DISCONTINUED | OUTPATIENT
Start: 2022-02-21 | End: 2022-02-24 | Stop reason: HOSPADM

## 2022-02-21 RX ORDER — 0.9 % SODIUM CHLORIDE 0.9 %
15 INTRAVENOUS SOLUTION INTRAVENOUS ONCE
Status: DISCONTINUED | OUTPATIENT
Start: 2022-02-21 | End: 2022-02-22

## 2022-02-21 RX ORDER — MAGNESIUM HYDROXIDE/ALUMINUM HYDROXICE/SIMETHICONE 120; 1200; 1200 MG/30ML; MG/30ML; MG/30ML
30 SUSPENSION ORAL ONCE
Status: COMPLETED | OUTPATIENT
Start: 2022-02-21 | End: 2022-02-21

## 2022-02-21 RX ORDER — POLYETHYLENE GLYCOL 3350 17 G/17G
17 POWDER, FOR SOLUTION ORAL DAILY PRN
Status: DISCONTINUED | OUTPATIENT
Start: 2022-02-21 | End: 2022-02-24 | Stop reason: HOSPADM

## 2022-02-21 RX ORDER — SODIUM CHLORIDE 0.9 % (FLUSH) 0.9 %
10 SYRINGE (ML) INJECTION PRN
Status: DISCONTINUED | OUTPATIENT
Start: 2022-02-21 | End: 2022-02-24 | Stop reason: HOSPADM

## 2022-02-21 RX ORDER — 0.9 % SODIUM CHLORIDE 0.9 %
15 INTRAVENOUS SOLUTION INTRAVENOUS ONCE
Status: DISCONTINUED | OUTPATIENT
Start: 2022-02-21 | End: 2022-02-21

## 2022-02-21 RX ORDER — ACETAMINOPHEN 325 MG/1
650 TABLET ORAL EVERY 6 HOURS PRN
Status: DISCONTINUED | OUTPATIENT
Start: 2022-02-21 | End: 2022-02-24 | Stop reason: HOSPADM

## 2022-02-21 RX ORDER — PROMETHAZINE HYDROCHLORIDE 25 MG/1
12.5 TABLET ORAL EVERY 6 HOURS PRN
Status: DISCONTINUED | OUTPATIENT
Start: 2022-02-21 | End: 2022-02-24 | Stop reason: HOSPADM

## 2022-02-21 RX ORDER — BACLOFEN 10 MG/1
5 TABLET ORAL ONCE
Status: COMPLETED | OUTPATIENT
Start: 2022-02-21 | End: 2022-02-21

## 2022-02-21 RX ADMIN — PIPERACILLIN AND TAZOBACTAM 3375 MG: 3; .375 INJECTION, POWDER, FOR SOLUTION INTRAVENOUS at 15:34

## 2022-02-21 RX ADMIN — INSULIN HUMAN 9 UNITS: 100 INJECTION, SOLUTION PARENTERAL at 16:56

## 2022-02-21 RX ADMIN — DEXTROSE AND SODIUM CHLORIDE: 5; 450 INJECTION, SOLUTION INTRAVENOUS at 18:33

## 2022-02-21 RX ADMIN — FAMOTIDINE 20 MG: 20 TABLET ORAL at 20:58

## 2022-02-21 RX ADMIN — VANCOMYCIN HYDROCHLORIDE 1000 MG: 1 INJECTION, POWDER, LYOPHILIZED, FOR SOLUTION INTRAVENOUS at 16:15

## 2022-02-21 RX ADMIN — PANTOPRAZOLE SODIUM 40 MG: 40 INJECTION, POWDER, FOR SOLUTION INTRAVENOUS at 21:38

## 2022-02-21 RX ADMIN — SODIUM CHLORIDE: 9 INJECTION, SOLUTION INTRAVENOUS at 15:47

## 2022-02-21 RX ADMIN — BACLOFEN 5 MG: 10 TABLET ORAL at 23:35

## 2022-02-21 RX ADMIN — SODIUM CHLORIDE 1000 ML: 9 INJECTION, SOLUTION INTRAVENOUS at 13:44

## 2022-02-21 RX ADMIN — BENZONATATE 200 MG: 100 CAPSULE ORAL at 20:58

## 2022-02-21 RX ADMIN — ALUMINUM HYDROXIDE, MAGNESIUM HYDROXIDE, AND SIMETHICONE 30 ML: 200; 200; 20 SUSPENSION ORAL at 17:43

## 2022-02-21 RX ADMIN — SODIUM CHLORIDE 0.1 UNITS/KG/HR: 9 INJECTION, SOLUTION INTRAVENOUS at 16:36

## 2022-02-21 RX ADMIN — ONDANSETRON HYDROCHLORIDE 4 MG: 2 INJECTION, SOLUTION INTRAMUSCULAR; INTRAVENOUS at 19:44

## 2022-02-21 RX ADMIN — LIDOCAINE HYDROCHLORIDE 15 ML: 20 SOLUTION ORAL; TOPICAL at 17:43

## 2022-02-21 RX ADMIN — METRONIDAZOLE 500 MG: 500 INJECTION, SOLUTION INTRAVENOUS at 23:34

## 2022-02-21 RX ADMIN — ENOXAPARIN SODIUM 40 MG: 100 INJECTION SUBCUTANEOUS at 18:48

## 2022-02-21 ASSESSMENT — PAIN DESCRIPTION - LOCATION: LOCATION: CHEST

## 2022-02-21 ASSESSMENT — ENCOUNTER SYMPTOMS
ABDOMINAL PAIN: 0
WHEEZING: 0
RHINORRHEA: 0
COUGH: 0
SHORTNESS OF BREATH: 0
VOMITING: 1
PHOTOPHOBIA: 0
NAUSEA: 1
DIARRHEA: 0
BACK PAIN: 0

## 2022-02-21 ASSESSMENT — PAIN - FUNCTIONAL ASSESSMENT: PAIN_FUNCTIONAL_ASSESSMENT: 0-10

## 2022-02-21 ASSESSMENT — PAIN SCALES - GENERAL: PAINLEVEL_OUTOF10: 7

## 2022-02-21 ASSESSMENT — PAIN DESCRIPTION - PAIN TYPE
TYPE: ACUTE PAIN
TYPE: ACUTE PAIN

## 2022-02-21 NOTE — FLOWSHEET NOTE
02/21/22 1841   Vitals   Temp 98.5 °F (36.9 °C)   Temp Source Oral   Pulse 126   Resp 26   BP (!) 139/92   MAP (mmHg) 106   Oxygen Therapy   SpO2 99 %   Pt admitted to ICU room 16. VSS. Remains tachycardic. Denies any worsening nausea at this time. Blood glucose 154. Multiplier initiated. IVF changed to D5 and 1/2 NS @ 150. Insulin gtt @ 2.8 ml/hr. Admission question completed. Pt unsure oh home meds. Will call wife to reconcile meds. Pt denies any further assistance at the moment. Will continue to monitor.

## 2022-02-21 NOTE — ED NOTES
Bed: 01  Expected date:   Expected time:   Means of arrival:   Comments:  CODE BED Claudene Books  02/21/22 4883

## 2022-02-21 NOTE — CONSULTS
Patient is being seen at the request of Kel Maddoxma  for a consultation for DKA    HISTORY OF PRESENT ILLNESS:   42 yo with DM, dermatomyositis, juvenile rheumatoid arthritis, celiac disease presented with vomiting since Thursday. Moderate to severe, several times a day. Associated with mild abdominal pain. Diarrhea started on Saturday. Chills with subjective fever. No cough or sputum production. Sugar has been high for a few days. Patient has been forgetting some of his diabetic medications at home. Work-up in ED revealed lactic acidosis, NOVA, elevated blood glucose and beta hydroxybutyrate. Patient was admitted September 2021 with DKA treated also for right lower lobe pneumonia. PAST MEDICAL HISTORY:  Past Medical History:   Diagnosis Date    Anesthesia complication     BLOOD PRESSURE DROP IN OR DURING THR    Celiac disease     Dermatomyositis ([address]     Last attack 2009    Diabetes mellitus ([address]     TYPE I    Fibromyalgia     Hyperlipidemia     Hypertension     Juvenile rheumatoid arthritis ([address]     Movement disorder     DERMATOMYOSITIS/IN REMISSION    Rheumatoid arthritis(714.0)     Vertebral basilar insufficiency     symptoms related to JRA in cervical area    Wears glasses      PAST SURGICAL HISTORY:  Past Surgical History:   Procedure Laterality Date    HIP SURGERY      bilateral    JOINT REPLACEMENT Bilateral     THR    JOINT REPLACEMENT Left 6/3/2016    Revision left lateral total hip replacement    KNEE ARTHROSCOPY Right     KNEE ARTHROSCOPY Left 9/25/2013    medial & lateral Meniscectomy    LIVER BIOPSY      MUSCLE BIOPSY      TYMPANOSTOMY TUBE PLACEMENT      WRIST SURGERY Right     CYST       FAMILY HISTORY:  family history includes Diabetes in his father; Heart Disease in his maternal grandmother; High Blood Pressure in his mother. SOCIAL HISTORY:   reports that he has never smoked.  He has never used smokeless tobacco.    Scheduled Meds:   vancomycin  1,000 mg IntraVENous Once    piperacillin-tazobactam  3,375 mg IntraVENous Once    sodium chloride  15 mL/kg IntraVENous Once    insulin regular  0.1 Units/kg IntraVENous Once     Continuous Infusions:   sodium chloride 250 mL/hr at 02/21/22 1547    dextrose 5 % and 0.45 % NaCl      insulin       PRN Meds:  potassium chloride, magnesium sulfate, sodium phosphate IVPB **OR** sodium phosphate IVPB **OR** sodium phosphate IVPB, dextrose 5 % and 0.45 % NaCl, dextrose bolus (hypoglycemia) **OR** dextrose bolus (hypoglycemia)    ALLERGIES:  Patient is allergic to aspirin. REVIEW OF SYSTEMS:  Constitutional: + Chills  HENT: Negative for sore throat  Eyes: Negative for redness   Respiratory: Negative for dyspnea, cough  Cardiovascular: Negative for chest pain  Gastrointestinal: + Vomiting, diarrhea   Genitourinary: Negative for hematuria   Musculoskeletal + arthralgias   Skin: Negative for rash  Neurological: Negative for syncope  Hematological: Negative for adenopathy  Psychiatric/Behavorial: Negative for anxiety    PHYSICAL EXAM:  Blood pressure (!) 164/107, pulse 138, temperature 98.4 °F (36.9 °C), temperature source Axillary, resp. rate 30, height 5' 9\" (1.753 m), weight 192 lb (87.1 kg), SpO2 100 %.' on RA  Gen: No distress. Ill-appearing  Eyes: PERRL. No sclera icterus. No conjunctival injection. ENT: No discharge. Pharynx clear. Neck: Trachea midline. No obvious mass. Resp: No accessory muscle use. No crackles. No wheezes. No rhonchi. No dullness on percussion. CV: Tacky rate. Regular rhythm. No murmur or rub. No edema. GI: Minimally tender. Non-distended. No hernia. Skin: Warm and dry. No nodule on exposed extremities. Lymph: No cervical LAD. No supraclavicular LAD. M/S: No cyanosis. No joint deformity. No clubbing. Neuro: Awake. Alert. Moves all four extremities. Psych: Oriented x 3. No anxiety.      LABS:  CBC:   Recent Labs     02/21/22  1339   WBC 24.2*   HGB 15.4   HCT 51.9   MCV 93.4 *     BMP:   Recent Labs     02/21/22  1339   *   K 5.7*   CL 81*   CO2 5*   BUN 32*   CREATININE 2.2*     LIVER PROFILE:   Recent Labs     02/21/22  1339   AST 9*   ALT 15   LIPASE 58.0   BILITOT 0.3   ALKPHOS 173*     PT/INR: No results for input(s): PROTIME, INR in the last 72 hours. APTT: No results for input(s): APTT in the last 72 hours. UA:  Recent Labs     02/21/22  1330   COLORU Yellow   PHUR 5.5   WBCUA 0-2   RBCUA 3-4   BACTERIA 1+*   CLARITYU Clear   SPECGRAV 1.025   LEUKOCYTESUR Negative   UROBILINOGEN 0.2   BILIRUBINUR SMALL*   BLOODU SMALL*   GLUCOSEU >=1000*   AMORPHOUS 1+     No results for input(s): PHART, [id number], PO2ART in the last 72 hours. Chest x-ray 2/21 imaging was reviewed by me and showed   No acute cardiopulmonary disease    ASSESSMENT:  · Severe diabetic ketoacidosis  · Acute metabolic encephalopathy  · Electrolytes disorder   · Anion wang metabolic acidosis  · Leukocytosis- favor reactive   · Lactic acidosis  · Acute kidney injury  · History of  dermatomyositis, juvenile rheumatoid arthritis, celiac disease on Plaquenil    PLAN:   Supplemental oxygen to maintain SaO2 >92%; wean as tolerated   Closely monitory airways, clinical status, cardiac rhythm, vital signs, and urine output   · IVF resuscitation, electrolytes repletion, electrolytes and glucose monitoring and insulin drip are per DKA protocol   · IV hydration with NS  · Cardiac monitor and pulse oximtery  · Zosyn for now pending cultures  · Blood culture, urine culture, sputum culture  · Follow lactic acid every 6 until clearance  · Diabetic education.    · DVT prophylaxis: Lovenox  · MRSA prophylaxis: Bactroban

## 2022-02-21 NOTE — ED PROVIDER NOTES
Emergency Department Provider Note  Location: [place] [address] ED  2/21/2022     Patient Identification  Maggie Cranker is a 43 y.o. male    Chief Complaint  Hyperglycemia (Pt came into ED from home for hyperglycemia. Pt states that his BG last night was 103 and this morning it was high. Pt states that he started feeling bad on Friday.)          HPI  (History provided by patient, EMS)  Patient is a 57-year-old male type I diabetic presents with hyperglycemia. Patient reports that he has been vomiting for the past 24 hours. Reports he has been compliant with medications however family member called EMS and was found to have high reading on glucometer. He arrives by EMS is tachycardic diaphoretic with Kussmaul respirations. He was moved into our resuscitation bay. He is mentating clearly able to answer questions. Denies any other symptoms other than nausea and vomiting today. No exacerbating or alleviating factors. I have reviewed the following nursing documentation:  Allergies: Allergies   Allergen Reactions    Aspirin Other (See Comments)     Aspirin sensitive       Past medical history:  has a past medical history of Anesthesia complication, Celiac disease, Dermatomyositis ([address] Diabetes mellitus ([address] Fibromyalgia, Hyperlipidemia, Hypertension, Juvenile rheumatoid arthritis ([address] Movement disorder, Rheumatoid arthritis(714.0), Vertebral basilar insufficiency, and Wears glasses. Past surgical history:  has a past surgical history that includes hip surgery; Tympanostomy tube placement; Wrist surgery (Right); liver biopsy; Muscle biopsy; Knee arthroscopy (Right); Knee arthroscopy (Left, 9/25/2013); joint replacement (Bilateral); and joint replacement (Left, 6/3/2016). Home medications:   Prior to Admission medications    Medication Sig Start Date End Date Taking?  Authorizing Provider   insulin aspart (NOVOLOG FLEXPEN) 100 UNIT/ML injection pen INJECT 12 U BEFORE MEALS AS DIRECTED. 9/14/21   Amparo Beckford MD   insulin detemir (LEVEMIR FLEXTOUCH) 100 UNIT/ML injection pen Inject 20 Units into the skin 2 times daily 9/14/21   Amparo Beckford MD   buPROPion (WELLBUTRIN XL) 150 MG extended release tablet Take 150 mg by mouth every morning    Historical Provider, MD   rosuvastatin (CRESTOR) 10 MG tablet Take 10 mg by mouth daily    Historical Provider, MD   buPROPion (WELLBUTRIN XL) 300 MG extended release tablet Take 300 mg by mouth every morning    Historical Provider, MD   VRAYLAR 6 MG CAPS capsule  8/24/21   Historical Provider, MD   topiramate (TOPAMAX) 50 MG tablet TAKE 1 TABLET BY MOUTH EVERYDAY AT BEDTIME 8/14/21   Historical Provider, MD   Pantoprazole Sodium (PROTONIX) 40 MG PACK packet Take 40 mg by mouth daily. Historical Provider, MD   pregabalin (LYRICA) 150 MG capsule Take 300 mg by mouth 2 times daily. Historical Provider, MD   hydroxychloroquine (PLAQUENIL) 200 MG tablet Take 200 mg by mouth 2 times daily. Historical Provider, MD       Social history:  reports that he has never smoked. He has never used smokeless tobacco. He reports current alcohol use. He reports that he does not use drugs. Family history:    Family History   Problem Relation Age of Onset    High Blood Pressure Mother     Diabetes Father     Heart Disease Maternal Grandmother          ROS  Review of Systems   Constitutional: Negative for chills and fever. HENT: Negative for congestion and rhinorrhea. Eyes: Negative for photophobia and visual disturbance. Respiratory: Negative for cough, shortness of breath and wheezing. Cardiovascular: Negative for chest pain and palpitations. Gastrointestinal: Positive for nausea and vomiting. Negative for abdominal pain and diarrhea. Genitourinary: Negative for dysuria and hematuria. Musculoskeletal: Negative for back pain and neck pain. Skin: Negative for rash and wound.    Neurological: Negative for syncope and weakness. Psychiatric/Behavioral: Negative for agitation and confusion. Exam  ED Triage Vitals   BP Temp Temp src Pulse Resp SpO2 Height Weight   -- -- -- -- -- -- -- --       Physical Exam  Vitals and nursing note reviewed. Constitutional:       General: He is not in acute distress. Appearance: He is well-developed. He is ill-appearing, toxic-appearing and diaphoretic. HENT:      Head: Normocephalic and atraumatic. Nose: Nose normal. No congestion. Eyes:      General: No scleral icterus. Extraocular Movements: Extraocular movements intact. Pupils: Pupils are equal, round, and reactive to light. Cardiovascular:      Rate and Rhythm: Regular rhythm. Tachycardia present. Heart sounds: No murmur heard. Pulmonary:      Effort: Pulmonary effort is normal.      Comments: Tachypneic clear breath sounds  Abdominal:      General: There is no distension. Palpations: Abdomen is soft. Tenderness: There is no abdominal tenderness. There is no guarding or rebound. Musculoskeletal:         General: No deformity. Normal range of motion. Cervical back: Normal range of motion and neck supple. Skin:     General: Skin is warm. Findings: No rash. Neurological:      Mental Status: He is alert and oriented to person, place, and time. Motor: No abnormal muscle tone.       Coordination: Coordination normal.      Comments: +2 patellar reflexes, no tremor no clonus   Psychiatric:         Mood and Affect: Mood normal.         Behavior: Behavior normal.           ED Course    ED Medication Orders (From admission, onward)    Start Ordered     Status Ordering Provider    02/21/22 1730 02/21/22 1610  0.45 % sodium chloride infusion  CONTINUOUS        \"Followed by\" Linked Group Details    Acknowledged BOAZ VELASQUEZ    02/21/22 1645 02/21/22 1610  enoxaparin (LOVENOX) injection 40 mg  DAILY         Acknowledged BOAZ VELASQUEZ    02/21/22 1632 02/21/22 1632  dextrose bolus sodium chloride IV bolus 30 mL/kg  ONCE         Last MAR action: Stopped - by Maggie Koroma on 02/21/22 at 1503 AJ TORIBIO          EKG  Sinus tachycardia rate 140 normal axis normal intervals no evidence of conduction abnormalities no diagnostic ischemic changes noted       Radiology  XR CHEST PORTABLE    Result Date: 2/21/2022  EXAMINATION: ONE XRAY VIEW OF THE CHEST 2/21/2022 2:03 pm COMPARISON: September 11, 2021 HISTORY: ORDERING SYSTEM PROVIDED HISTORY: dka TECHNOLOGIST PROVIDED HISTORY: Reason for exam:->dka Reason for Exam: dka FINDINGS: The cardiomediastinal silhouette is normal in size. The lungs are clear. No pleural effusion or pneumothorax is present. No acute cardiopulmonary process.          Labs  Results for orders placed or performed during the hospital encounter of 02/21/22   COVID-19 & Influenza Combo    Specimen: Nasopharyngeal Swab   Result Value Ref Range    SARS-CoV-2 RNA, RT PCR NOT DETECTED NOT DETECTED    INFLUENZA A NOT DETECTED NO NOT DETECTED    INFLUENZA B NOT DETECTED NOT DETECTED   CBC with Auto Differential   Result Value Ref Range    WBC 24.2 (H) 4.0 - 11.0 K/uL    RBC 5.56 4.20 - 5.90 M/uL    Hemoglobin 15.4 13.5 - 17.5 g/dL    Hematocrit 51.9 40.5 - 52.5 %    MCV 93.4 80.0 - 100.0 fL    MCH 27.7 26.0 - 34.0 pg    MCHC 29.7 (L) 31.0 - 36.0 g/dL    RDW 15.4 12.4 - 15.4 %    Platelets 093 (H) 505 - 450 K/uL    MPV 8.8 5.0 - 10.5 fL    PLATELET SLIDE REVIEW Increased     SLIDE REVIEW see below     Neutrophils % 86.0 %    Lymphocytes % 9.0 %    Monocytes % 3.0 %    Eosinophils % 0.0 %    Basophils % 0.0 %    Neutrophils Absolute 21.3 (H) 1.7 - 7.7 K/uL    Lymphocytes Absolute 2.2 1.0 - 5.1 K/uL    Monocytes Absolute 0.7 0.0 - 1.3 K/uL    Eosinophils Absolute 0.0 0.0 - 0.6 K/uL    Basophils Absolute 0.0 0.0 - 0.2 K/uL    Bands Relative 2 0 - 7 %   Comprehensive Metabolic Panel w/ Reflex to MG   Result Value Ref Range    Sodium 127 (L) 136 - 145 mmol/L    Potassium reflex Magnesium 5.7 (H) 3.5 - 5.1 mmol/L    Chloride 81 (L) 99 - 110 mmol/L    CO2 5 (LL) 21 - 32 mmol/L    Anion Gap 41 (H) 3 - 16    Glucose 847 (HH) 70 - 99 mg/dL    BUN 32 (H) 7 - 20 mg/dL    CREATININE 2.2 (H) 0.9 - 1.3 mg/dL    GFR Non- 34 (A) >60    GFR  41 (A) >60    Calcium 9.9 8.3 - 10.6 mg/dL    Total Protein 9.2 (H) 6.4 - 8.2 g/dL    Albumin 5.3 (H) 3.4 - 5.0 g/dL    Albumin/Globulin Ratio 1.4 1.1 - 2.2    Total Bilirubin 0.3 0.0 - 1.0 mg/dL    Alkaline Phosphatase 173 (H) 40 - 129 U/L    ALT 15 10 - 40 U/L    AST 9 (L) 15 - 37 U/L   Lipase   Result Value Ref Range    Lipase 58.0 13.0 - 60.0 U/L   Troponin   Result Value Ref Range    Troponin <0.01 <0.01 ng/mL   Lactate, Sepsis   Result Value Ref Range    Lactic Acid, Sepsis 4.9 (HH) 0.4 - 1.9 mmol/L   Lactate, Sepsis   Result Value Ref Range    Lactic Acid, Sepsis 1.7 0.4 - 1.9 mmol/L   Urinalysis with Reflex to Culture    Specimen: Urine   Result Value Ref Range    Color, UA Yellow Straw/Yellow    Clarity, UA Clear Clear    Glucose, Ur >=1000 (A) Negative mg/dL    Bilirubin Urine SMALL (A) Negative    Ketones, Urine >=80 (A) Negative mg/dL    Specific Gravity, UA 1.025 1.005 - 1.030    Blood, Urine SMALL (A) Negative    pH, UA 5.5 5.0 - 8.0    Protein,  (A) Negative mg/dL    Urobilinogen, Urine 0.2 <2.0 E.U./dL    Nitrite, Urine Negative Negative    Leukocyte Esterase, Urine Negative Negative    Microscopic Examination YES     Urine Reflex to Culture Not Indicated    Blood Gas, Venous   Result Value Ref Range    pH, [facility] 7.049 (LL) 7.350 - 7.450    pCO2, [facility] 22.5 (L) 40.0 - 50.0 mmHg    pO2, [facility] 58.7 (H) 25.0 - 40.0 mmHg    HCO3, Venous 6.1 (L) 23.0 - 29.0 mmol/L    Base Excess, [facility] -22.8 (L) -3.0 - 3.0 mmol/L    O2 Sat, [facility] 78 Not Established %    Carboxyhemoglobin 0.9 0.0 - 1.5 %    MetHgb, [facility] 0.1 <1.5 %    TC02 (Calc), [facility] 7 Not Established mmol/L    O2 Content, [facility] 20 Not Established VOL %    O2 Therapy Unknown Beta-Hydroxybutyrate   Result Value Ref Range    Beta-Hydroxybutyrate >8.00 (H) 0.00 - 0.27 mmol/L   Microscopic Urinalysis   Result Value Ref Range    WBC, UA 0-2 0 - 5 /HPF    RBC, UA 3-4 0 - 4 /HPF    Bacteria, UA 1+ (A) None Seen /HPF    Amorphous, UA 1+ /HPF   Procalcitonin   Result Value Ref Range    Procalcitonin 1.13 (H) 0.00 - 0.15 ng/mL   POCT glucose   Result Value Ref Range    QC OK? HI    POCT Glucose   Result Value Ref Range    POC Glucose >600 (AA) 70 - 99 mg/dl    Performed on ACCU-CHEK    EKG 12 Lead   Result Value Ref Range    Ventricular Rate 138 BPM    Atrial Rate 138 BPM    P-R Interval 126 ms    QRS Duration 68 ms    Q-T Interval 284 ms    QTc Calculation (Bazett) 430 ms    P Axis 69 degrees    R Axis 75 degrees    T Axis 63 degrees    Diagnosis       Sinus tachycardiaPossible Left atrial enlargementNonspecific ST abnormalityWhen compared with ECG of 07-SEP-2021 16:50,Sinus rhythm has replaced Wide QRS tachycardiaConfirmed by FLORIN Dhillon MD ([id number]) on 2/21/2022 4:22:13 PM         [facility]  Patient seen and evaluated. Relevant records reviewed. 70-year-old male presents with hyperglycemia. On arrival consistent with DKA diaphoretic tachycardic tachypneic. Moved into our resuscitation bay started on IV fluids. Labs consistent with DKA. He has a substantial leukocytosis and elevated lactate. Besides nausea and vomiting no obvious source for sepsis however I am treating him empirically with broad-spectrum antibiotics. Patient is required multiple reassessments and close observation of his mental status and respiratory status. He is overall improved with initial resuscitation. Discussed with hospitalist discussed with pulmonology. We will plan admit to ICU. Clinical Impression:  1. Diabetic ketoacidosis without coma associated with type 1 diabetes mellitus ([facility] [address]    2. Lactic acid acidosis          Disposition:  Admit to CCU/ICU in critical condition.     Blood pressure (!) 164/107, pulse 138, temperature 98.4 °F (36.9 °C), temperature source Axillary, resp. rate 30, height 5' 9\" (1.753 m), weight 192 lb (87.1 kg), SpO2 100 %. Patient was given scripts for the following medications. I counseled patient how to take these medications. New Prescriptions    No medications on file       Disposition referral (if applicable):  No follow-up provider specified. Total critical care time is 75 minutes, which excludes separately billable procedures and updating family. Time spent is specifically for management of the presenting complaint and symptoms initially, direct bedside care, reevaluation, review of records, and consultation. There was a high probability of clinically significant life-threatening deterioration in the patient's condition, which required my urgent intervention. This chart was generated in part by using Dragon Dictation system and may contain errors related to that system including errors in grammar, punctuation, and spelling, as well as words and phrases that may be inappropriate. If there are any questions or concerns please feel free to contact the dictating provider for clarification.      Cristian Bundy MD  [address] MD  02/21/22 8303

## 2022-02-22 ENCOUNTER — APPOINTMENT (OUTPATIENT)
Dept: GENERAL RADIOLOGY | Age: 37
DRG: 637 | End: 2022-02-22
Payer: MEDICARE

## 2022-02-22 LAB
ALBUMIN SERPL-MCNC: 4 G/DL (ref 3.4–5)
ALP BLD-CCNC: 113 U/L (ref 40–129)
ALT SERPL-CCNC: 10 U/L (ref 10–40)
ANION GAP SERPL CALCULATED.3IONS-SCNC: 12 MMOL/L (ref 3–16)
ANION GAP SERPL CALCULATED.3IONS-SCNC: 15 MMOL/L (ref 3–16)
AST SERPL-CCNC: 10 U/L (ref 15–37)
BANDED NEUTROPHILS RELATIVE PERCENT: 1 % (ref 0–7)
BASE EXCESS VENOUS: -7.6 MMOL/L (ref -3–3)
BASOPHILS ABSOLUTE: 0 K/UL (ref 0–0.2)
BASOPHILS RELATIVE PERCENT: 0 %
BILIRUB SERPL-MCNC: 0.3 MG/DL (ref 0–1)
BILIRUBIN DIRECT: <0.2 MG/DL (ref 0–0.3)
BILIRUBIN, INDIRECT: ABNORMAL MG/DL (ref 0–1)
BUN BLDV-MCNC: 11 MG/DL (ref 7–20)
BUN BLDV-MCNC: 13 MG/DL (ref 7–20)
BUN BLDV-MCNC: 16 MG/DL (ref 7–20)
BUN BLDV-MCNC: 20 MG/DL (ref 7–20)
CALCIUM SERPL-MCNC: 8.5 MG/DL (ref 8.3–10.6)
CALCIUM SERPL-MCNC: 8.7 MG/DL (ref 8.3–10.6)
CALCIUM SERPL-MCNC: 8.8 MG/DL (ref 8.3–10.6)
CALCIUM SERPL-MCNC: 8.9 MG/DL (ref 8.3–10.6)
CARBOXYHEMOGLOBIN: 2 % (ref 0–1.5)
CHLORIDE BLD-SCNC: 102 MMOL/L (ref 99–110)
CHLORIDE BLD-SCNC: 103 MMOL/L (ref 99–110)
CO2: 15 MMOL/L (ref 21–32)
CO2: 17 MMOL/L (ref 21–32)
CO2: 18 MMOL/L (ref 21–32)
CO2: 19 MMOL/L (ref 21–32)
CREAT SERPL-MCNC: 0.9 MG/DL (ref 0.9–1.3)
CREAT SERPL-MCNC: 0.9 MG/DL (ref 0.9–1.3)
CREAT SERPL-MCNC: 1 MG/DL (ref 0.9–1.3)
CREAT SERPL-MCNC: 1.1 MG/DL (ref 0.9–1.3)
EOSINOPHILS ABSOLUTE: 0 K/UL (ref 0–0.6)
EOSINOPHILS RELATIVE PERCENT: 0 %
GFR AFRICAN AMERICAN: >60
GFR NON-AFRICAN AMERICAN: >60
GLUCOSE BLD-MCNC: 140 MG/DL (ref 70–99)
GLUCOSE BLD-MCNC: 145 MG/DL (ref 70–99)
GLUCOSE BLD-MCNC: 146 MG/DL (ref 70–99)
GLUCOSE BLD-MCNC: 158 MG/DL (ref 70–99)
GLUCOSE BLD-MCNC: 160 MG/DL (ref 70–99)
GLUCOSE BLD-MCNC: 170 MG/DL (ref 70–99)
GLUCOSE BLD-MCNC: 176 MG/DL (ref 70–99)
GLUCOSE BLD-MCNC: 182 MG/DL (ref 70–99)
GLUCOSE BLD-MCNC: 183 MG/DL (ref 70–99)
GLUCOSE BLD-MCNC: 183 MG/DL (ref 70–99)
GLUCOSE BLD-MCNC: 185 MG/DL (ref 70–99)
GLUCOSE BLD-MCNC: 187 MG/DL (ref 70–99)
GLUCOSE BLD-MCNC: 189 MG/DL (ref 70–99)
GLUCOSE BLD-MCNC: 190 MG/DL (ref 70–99)
GLUCOSE BLD-MCNC: 191 MG/DL (ref 70–99)
GLUCOSE BLD-MCNC: 192 MG/DL (ref 70–99)
GLUCOSE BLD-MCNC: 201 MG/DL (ref 70–99)
GLUCOSE BLD-MCNC: 201 MG/DL (ref 70–99)
GLUCOSE BLD-MCNC: 215 MG/DL (ref 70–99)
GLUCOSE BLD-MCNC: 254 MG/DL (ref 70–99)
GLUCOSE BLD-MCNC: 257 MG/DL (ref 70–99)
HCO3 VENOUS: 15.4 MMOL/L (ref 23–29)
HCT VFR BLD CALC: 38.4 % (ref 40.5–52.5)
HEMOGLOBIN: 12.9 G/DL (ref 13.5–17.5)
LYMPHOCYTES ABSOLUTE: 1.1 K/UL (ref 1–5.1)
LYMPHOCYTES RELATIVE PERCENT: 9 %
MAGNESIUM: 1.9 MG/DL (ref 1.8–2.4)
MAGNESIUM: 1.9 MG/DL (ref 1.8–2.4)
MAGNESIUM: 2 MG/DL (ref 1.8–2.4)
MAGNESIUM: 2 MG/DL (ref 1.8–2.4)
MCH RBC QN AUTO: 28.2 PG (ref 26–34)
MCHC RBC AUTO-ENTMCNC: 33.5 G/DL (ref 31–36)
MCV RBC AUTO: 84.2 FL (ref 80–100)
METHEMOGLOBIN VENOUS: 0.1 %
MONOCYTES ABSOLUTE: 0.9 K/UL (ref 0–1.3)
MONOCYTES RELATIVE PERCENT: 7 %
NEUTROPHILS ABSOLUTE: 10.7 K/UL (ref 1.7–7.7)
NEUTROPHILS RELATIVE PERCENT: 83 %
O2 SAT, VEN: 99 %
O2 THERAPY: ABNORMAL
PCO2, VEN: 25.3 MMHG (ref 40–50)
PDW BLD-RTO: 13.7 % (ref 12.4–15.4)
PERFORMED ON: ABNORMAL
PH VENOUS: 7.4 (ref 7.35–7.45)
PHOSPHORUS: 1.5 MG/DL (ref 2.5–4.9)
PHOSPHORUS: 1.5 MG/DL (ref 2.5–4.9)
PHOSPHORUS: 1.7 MG/DL (ref 2.5–4.9)
PHOSPHORUS: 1.8 MG/DL (ref 2.5–4.9)
PLATELET # BLD: 299 K/UL (ref 135–450)
PLATELET SLIDE REVIEW: ADEQUATE
PMV BLD AUTO: 7.8 FL (ref 5–10.5)
PO2, VEN: 163.3 MMHG (ref 25–40)
POTASSIUM SERPL-SCNC: 3.5 MMOL/L (ref 3.5–5.1)
POTASSIUM SERPL-SCNC: 3.6 MMOL/L (ref 3.5–5.1)
POTASSIUM SERPL-SCNC: 3.7 MMOL/L (ref 3.5–5.1)
POTASSIUM SERPL-SCNC: 3.8 MMOL/L (ref 3.5–5.1)
PROCALCITONIN: 0.96 NG/ML (ref 0–0.15)
RBC # BLD: 4.56 M/UL (ref 4.2–5.9)
RBC # BLD: NORMAL 10*6/UL
SARS-COV-2, PCR: NOT DETECTED
SLIDE REVIEW: ABNORMAL
SODIUM BLD-SCNC: 131 MMOL/L (ref 136–145)
SODIUM BLD-SCNC: 132 MMOL/L (ref 136–145)
SODIUM BLD-SCNC: 133 MMOL/L (ref 136–145)
SODIUM BLD-SCNC: 133 MMOL/L (ref 136–145)
TCO2 CALC VENOUS: 16 MMOL/L
TOTAL PROTEIN: 6.7 G/DL (ref 6.4–8.2)
WBC # BLD: 12.7 K/UL (ref 4–11)

## 2022-02-22 PROCEDURE — 6370000000 HC RX 637 (ALT 250 FOR IP): Performed by: INTERNAL MEDICINE

## 2022-02-22 PROCEDURE — 2500000003 HC RX 250 WO HCPCS

## 2022-02-22 PROCEDURE — 6360000002 HC RX W HCPCS

## 2022-02-22 PROCEDURE — 1200000000 HC SEMI PRIVATE

## 2022-02-22 PROCEDURE — 74018 RADEX ABDOMEN 1 VIEW: CPT

## 2022-02-22 PROCEDURE — 80048 BASIC METABOLIC PNL TOTAL CA: CPT

## 2022-02-22 PROCEDURE — 84100 ASSAY OF PHOSPHORUS: CPT

## 2022-02-22 PROCEDURE — 82803 BLOOD GASES ANY COMBINATION: CPT

## 2022-02-22 PROCEDURE — 2580000003 HC RX 258

## 2022-02-22 PROCEDURE — 2580000003 HC RX 258: Performed by: INTERNAL MEDICINE

## 2022-02-22 PROCEDURE — 80076 HEPATIC FUNCTION PANEL: CPT

## 2022-02-22 PROCEDURE — 83036 HEMOGLOBIN GLYCOSYLATED A1C: CPT

## 2022-02-22 PROCEDURE — 83735 ASSAY OF MAGNESIUM: CPT

## 2022-02-22 PROCEDURE — 99233 SBSQ HOSP IP/OBS HIGH 50: CPT | Performed by: INTERNAL MEDICINE

## 2022-02-22 PROCEDURE — 6360000002 HC RX W HCPCS: Performed by: INTERNAL MEDICINE

## 2022-02-22 PROCEDURE — 85025 COMPLETE CBC W/AUTO DIFF WBC: CPT

## 2022-02-22 PROCEDURE — 36415 COLL VENOUS BLD VENIPUNCTURE: CPT

## 2022-02-22 PROCEDURE — C9113 INJ PANTOPRAZOLE SODIUM, VIA: HCPCS | Performed by: INTERNAL MEDICINE

## 2022-02-22 PROCEDURE — A4216 STERILE WATER/SALINE, 10 ML: HCPCS | Performed by: INTERNAL MEDICINE

## 2022-02-22 PROCEDURE — 2500000003 HC RX 250 WO HCPCS: Performed by: INTERNAL MEDICINE

## 2022-02-22 RX ORDER — TOPIRAMATE 25 MG/1
50 TABLET ORAL NIGHTLY
Status: DISCONTINUED | OUTPATIENT
Start: 2022-02-22 | End: 2022-02-24 | Stop reason: HOSPADM

## 2022-02-22 RX ORDER — PREGABALIN 100 MG/1
300 CAPSULE ORAL 2 TIMES DAILY
Status: DISCONTINUED | OUTPATIENT
Start: 2022-02-22 | End: 2022-02-24 | Stop reason: HOSPADM

## 2022-02-22 RX ORDER — SODIUM CHLORIDE 9 MG/ML
INJECTION, SOLUTION INTRAVENOUS CONTINUOUS
Status: DISCONTINUED | OUTPATIENT
Start: 2022-02-22 | End: 2022-02-23

## 2022-02-22 RX ORDER — NICOTINE POLACRILEX 4 MG
15 LOZENGE BUCCAL PRN
Status: DISCONTINUED | OUTPATIENT
Start: 2022-02-22 | End: 2022-02-24 | Stop reason: HOSPADM

## 2022-02-22 RX ORDER — INSULIN GLARGINE 100 [IU]/ML
20 INJECTION, SOLUTION SUBCUTANEOUS
Status: DISCONTINUED | OUTPATIENT
Start: 2022-02-22 | End: 2022-02-24 | Stop reason: HOSPADM

## 2022-02-22 RX ORDER — DEXTROSE MONOHYDRATE 50 MG/ML
100 INJECTION, SOLUTION INTRAVENOUS PRN
Status: DISCONTINUED | OUTPATIENT
Start: 2022-02-22 | End: 2022-02-24 | Stop reason: HOSPADM

## 2022-02-22 RX ORDER — DEXTROSE MONOHYDRATE 25 G/50ML
12.5 INJECTION, SOLUTION INTRAVENOUS PRN
Status: DISCONTINUED | OUTPATIENT
Start: 2022-02-22 | End: 2022-02-22 | Stop reason: ALTCHOICE

## 2022-02-22 RX ADMIN — INSULIN GLARGINE 20 UNITS: 100 INJECTION, SOLUTION SUBCUTANEOUS at 13:37

## 2022-02-22 RX ADMIN — LISINOPRIL 30 MG: 10 TABLET ORAL at 16:39

## 2022-02-22 RX ADMIN — METRONIDAZOLE 500 MG: 500 INJECTION, SOLUTION INTRAVENOUS at 07:02

## 2022-02-22 RX ADMIN — SODIUM CHLORIDE: 9 INJECTION, SOLUTION INTRAVENOUS at 22:44

## 2022-02-22 RX ADMIN — CARIPRAZINE 6 MG: 3 CAPSULE, GELATIN COATED ORAL at 16:39

## 2022-02-22 RX ADMIN — SODIUM PHOSPHATE, MONOBASIC, MONOHYDRATE 15 MMOL: 276; 142 INJECTION, SOLUTION INTRAVENOUS at 10:12

## 2022-02-22 RX ADMIN — INSULIN LISPRO 3 UNITS: 100 INJECTION, SOLUTION INTRAVENOUS; SUBCUTANEOUS at 22:53

## 2022-02-22 RX ADMIN — INSULIN LISPRO 2 UNITS: 100 INJECTION, SOLUTION INTRAVENOUS; SUBCUTANEOUS at 16:42

## 2022-02-22 RX ADMIN — TOPIRAMATE 50 MG: 25 TABLET, FILM COATED ORAL at 22:50

## 2022-02-22 RX ADMIN — CEFTRIAXONE SODIUM 1000 MG: 1 INJECTION, POWDER, FOR SOLUTION INTRAMUSCULAR; INTRAVENOUS at 22:46

## 2022-02-22 RX ADMIN — HYDROXYCHLOROQUINE SULFATE 200 MG: 200 TABLET ORAL at 00:23

## 2022-02-22 RX ADMIN — Medication 10 ML: at 08:22

## 2022-02-22 RX ADMIN — ACETAMINOPHEN 650 MG: 325 TABLET ORAL at 10:42

## 2022-02-22 RX ADMIN — HYDROXYCHLOROQUINE SULFATE 200 MG: 200 TABLET ORAL at 22:50

## 2022-02-22 RX ADMIN — BENZONATATE 200 MG: 100 CAPSULE ORAL at 22:50

## 2022-02-22 RX ADMIN — ONDANSETRON HYDROCHLORIDE 4 MG: 2 INJECTION, SOLUTION INTRAMUSCULAR; INTRAVENOUS at 15:03

## 2022-02-22 RX ADMIN — HYDROXYCHLOROQUINE SULFATE 200 MG: 200 TABLET ORAL at 08:24

## 2022-02-22 RX ADMIN — INSULIN LISPRO 2 UNITS: 100 INJECTION, SOLUTION INTRAVENOUS; SUBCUTANEOUS at 13:37

## 2022-02-22 RX ADMIN — ROSUVASTATIN CALCIUM 10 MG: 10 TABLET, FILM COATED ORAL at 08:21

## 2022-02-22 RX ADMIN — POTASSIUM CHLORIDE 10 MEQ: 7.46 INJECTION, SOLUTION INTRAVENOUS at 09:56

## 2022-02-22 RX ADMIN — SODIUM CHLORIDE: 9 INJECTION, SOLUTION INTRAVENOUS at 13:36

## 2022-02-22 RX ADMIN — BENZONATATE 200 MG: 100 CAPSULE ORAL at 08:21

## 2022-02-22 RX ADMIN — PREGABALIN 300 MG: 100 CAPSULE ORAL at 22:50

## 2022-02-22 RX ADMIN — CEFTRIAXONE SODIUM 1000 MG: 1 INJECTION, POWDER, FOR SOLUTION INTRAMUSCULAR; INTRAVENOUS at 01:02

## 2022-02-22 RX ADMIN — METRONIDAZOLE 500 MG: 500 INJECTION, SOLUTION INTRAVENOUS at 15:09

## 2022-02-22 RX ADMIN — SODIUM CHLORIDE 10 ML: 9 INJECTION INTRAMUSCULAR; INTRAVENOUS; SUBCUTANEOUS at 08:22

## 2022-02-22 RX ADMIN — FAMOTIDINE 20 MG: 20 TABLET ORAL at 08:21

## 2022-02-22 RX ADMIN — ENOXAPARIN SODIUM 40 MG: 100 INJECTION SUBCUTANEOUS at 08:22

## 2022-02-22 RX ADMIN — BENZONATATE 200 MG: 100 CAPSULE ORAL at 13:39

## 2022-02-22 RX ADMIN — DEXTROSE AND SODIUM CHLORIDE: 5; 450 INJECTION, SOLUTION INTRAVENOUS at 09:54

## 2022-02-22 RX ADMIN — POTASSIUM CHLORIDE 10 MEQ: 7.46 INJECTION, SOLUTION INTRAVENOUS at 11:23

## 2022-02-22 RX ADMIN — FAMOTIDINE 20 MG: 20 TABLET ORAL at 22:50

## 2022-02-22 RX ADMIN — ACETAMINOPHEN 650 MG: 325 TABLET ORAL at 18:11

## 2022-02-22 RX ADMIN — ONDANSETRON HYDROCHLORIDE 4 MG: 2 INJECTION, SOLUTION INTRAMUSCULAR; INTRAVENOUS at 08:21

## 2022-02-22 RX ADMIN — POTASSIUM CHLORIDE 10 MEQ: 7.46 INJECTION, SOLUTION INTRAVENOUS at 12:30

## 2022-02-22 RX ADMIN — PANTOPRAZOLE SODIUM 40 MG: 40 INJECTION, POWDER, FOR SOLUTION INTRAVENOUS at 08:22

## 2022-02-22 ASSESSMENT — PAIN SCALES - GENERAL
PAINLEVEL_OUTOF10: 8
PAINLEVEL_OUTOF10: 5

## 2022-02-22 NOTE — PROGRESS NOTES
Pulmonary & Critical Care Medicine ICU Progress Note    CC: DKA    Events of Last 24 hours: Insulin drip   RA   Some nausea this am       Vascular lines: IV: PIVs          / / /   No results for input(s): PHART, [id number], PO2ART in the last 72 hours. IV:   dextrose 5 % and 0.45 % NaCl 150 mL/hr at 22 1833    sodium chloride      insulin 0.029 Units/kg/hr (22 0657)    sodium chloride         Vitals:  Blood pressure (!) 155/100, pulse 111, temperature 98 °F (36.7 °C), temperature source Oral, resp. rate 14, height 5' 9\" (1.753 m), weight 194 lb 10.7 oz (88.3 kg), SpO2 100 %. on RA  Temp  Av.3 °F (36.8 °C)  Min: 98 °F (36.7 °C)  Max: 98.6 °F (37 °C)    Intake/Output Summary (Last 24 hours) at 2022 0719  Last data filed at 2022 0500  Gross per 24 hour   Intake 1607.35 ml   Output 1500 ml   Net 107.35 ml     PE:  Gen: No distress. Ill-appearing  Eyes: PERRL. No sclera icterus. No conjunctival injection. ENT: No discharge. Pharynx clear. Neck: Trachea midline. No obvious mass. Resp: No accessory muscle use. No  crackles. No wheezes. No rhonchi. No dullness on percussion. CV: Tacky rate. Regular rhythm. No murmur or rub. No edema. GI: Minimally tender. Non-distended. No hernia. Skin: Warm and dry. No nodule on exposed extremities. Lymph: No cervical LAD. No supraclavicular LAD. M/S: No cyanosis. No joint deformity. No clubbing. Neuro: Awake. Alert. Moves all four extremities. Psych: Oriented x 3.  No anxiety    Scheduled Meds:   enoxaparin  40 mg SubCUTAneous Daily    sodium chloride  15 mL/kg IntraVENous Once    sodium chloride flush  10 mL IntraVENous 2 times per day    pantoprazole  40 mg IntraVENous Daily    And    sodium chloride (PF)  10 mL IntraVENous Daily    famotidine  20 mg Oral BID    benzonatate  200 mg Oral TID    cefTRIAXone (ROCEPHIN) IV  1,000 mg IntraVENous Q24H    metroNIDAZOLE  500 mg IntraVENous Q8H    hydroxychloroquine  200 mg Oral BID    rosuvastatin  10 mg Oral Daily    buPROPion  300 mg Oral QAM    buPROPion  150 mg Oral QAM       Data:  CBC:   Recent Labs     02/21/22  1339 02/22/22  0357   WBC 24.2* 12.7*   HGB 15.4 12.9*   HCT 51.9 38.4*   MCV 93.4 84.2   * 299     BMP:   Recent Labs     02/21/22  1942 02/21/22  2345 02/22/22  0357   * 133* 131*   K 4.0 3.8 3.5    103 102   CO2 12* 15* 17*   PHOS 1.3* 1.5* 1.7*   BUN 24* 20 16   CREATININE 1.3 1.1 1.0     LIVER PROFILE:   Recent Labs     02/21/22  1339 02/22/22  0357   AST 9* 10*   ALT 15 10   LIPASE 58.0  --    BILIDIR  --  <0.2   BILITOT 0.3 0.3   ALKPHOS 173* 113       Microbiology:  2/21 [facility] NGTD  2/21 COVID-19 not detected    Imaging:  Chest x-ray 2/21 imaging was reviewed by me and showed No acute cardiopulmonary disease     ASSESSMENT:  · Severe diabetic ketoacidosis  · Acute metabolic encephalopathy  · Electrolytes disorder   · Anion wang metabolic acidosis  · Leukocytosis- favor reactive   · Lactic acidosis  · Acute kidney injury  · History of  dermatomyositis, juvenile rheumatoid arthritis, celiac disease on Plaquenil     PLAN:  · Supplemental oxygen to maintain SaO2 >92%; wean as tolerated  · Closely monitory airways, clinical status, cardiac rhythm, vital signs, and urine output   · IVF resuscitation, electrolytes repletion, electrolytes and glucose monitoring and insulin drip are per DKA protocol - could come off insulin drip today   · IV hydration with NS  · Cardiac monitor and pulse oximtery  · Ceftriaxone/Flagyl per IM   · Follow up Cx   · Diabetic education  · Home medications were addressed- will resume    · DVT prophylaxis: Lovenox  · MRSA prophylaxis: Bactroban  · Okay to move out of the ICU from our perspective once off insulin drip

## 2022-02-22 NOTE — CARE COORDINATION
Chart review completed. Pt has a Covid pending under labs and confirmed with RN that it is pending. Attempted calling pt's bedside phone but it rang with no answer. Pt's wife Ami's cell phone was not taking calls. CM will attempt again when able.

## 2022-02-22 NOTE — FLOWSHEET NOTE
02/22/22 0700   Vitals   Temp 98 °F (36.7 °C)   Temp Source Oral   Pulse 111   Heart Rate Source Monitor   Resp 14   BP (!) 155/100   MAP (mmHg) 115   BP Method Automatic   Oxygen Therapy   SpO2 100 %   O2 Device None (Room air)   Vital signs stable. Pt is alert and oriented and complains of mild nausea at the moment. PRN zofran provided. Nothing new noted on head to toe assessment. Insulin gtt continues. D5 1/2 NS continues at 150 ml/hr. K+ replete. Sodium phos replete. Anion gap and Co2 within recommended range to discontinue insulin gtt. Perfect serve message sent to hospitalist for sub Q insulin orders. Pt is ST on the monitor. Morning medication administration completed. Pt denies any further assistance at the moment. Will continue to monitor.

## 2022-02-22 NOTE — PROGRESS NOTES
Hospitalist Progress Note      PCP: Yoan Irwin MD    Date of Admission: 2/21/2022    Chief Complaint: diarrhea, emesis, from home. Based on my conversation with him I am concerned he is not very compliant with his insulin at home. He feels that he gets confused about sliding scale instructions and how much insulin to give himself. I think he will benefit greatly from formal diabetes education OP after discharge. Subjective:     Reports he feels congested and has been coughing. Reports he is afraid to eat because of nausea, but feels better overall.             Medications:  Reviewed    Infusion Medications    sodium chloride 150 mL/hr at 02/22/22 1336    dextrose      sodium chloride       Scheduled Medications    insulin glargine  20 Units SubCUTAneous QAM AC    insulin lispro  0-12 Units SubCUTAneous TID WC    insulin lispro  0-6 Units SubCUTAneous Nightly    enoxaparin  40 mg SubCUTAneous Daily    sodium chloride flush  10 mL IntraVENous 2 times per day    pantoprazole  40 mg IntraVENous Daily    And    sodium chloride (PF)  10 mL IntraVENous Daily    famotidine  20 mg Oral BID    benzonatate  200 mg Oral TID    cefTRIAXone (ROCEPHIN) IV  1,000 mg IntraVENous Q24H    metroNIDAZOLE  500 mg IntraVENous Q8H    hydroxychloroquine  200 mg Oral BID    rosuvastatin  10 mg Oral Daily    buPROPion  300 mg Oral QAM     PRN Meds: glucose, glucagon (rDNA), dextrose, dextrose bolus (hypoglycemia) **OR** dextrose bolus (hypoglycemia), potassium chloride, magnesium sulfate, sodium phosphate IVPB **OR** sodium phosphate IVPB **OR** sodium phosphate IVPB, polyethylene glycol, sodium chloride flush, sodium chloride, promethazine **OR** ondansetron, acetaminophen **OR** acetaminophen      Intake/Output Summary (Last 24 hours) at 2/22/2022 1345  Last data filed at 2/22/2022 1200  Gross per 24 hour   Intake 1607.35 ml   Output 1500 ml   Net 107.35 ml       Physical Exam Performed:    BP (!) 147/94   Pulse 100   Temp 98 °F (36.7 °C) (Oral)   Resp 22   Ht 5' 9\" (1.753 m)   Wt 194 lb 10.7 oz (88.3 kg)   SpO2 99%   BMI 28.75 kg/m²     General appearance: No apparent distress, appears stated age and cooperative. HEENT: Pupils equal, round, and reactive to light. Conjunctivae/corneas clear. Neck: Supple, with full range of motion. No jugular venous distention. Trachea midline. Respiratory:  Normal respiratory effort. Clear to auscultation, bilaterally without Rales/Wheezes/Rhonchi. Cardiovascular: Regular rate and rhythm with normal S1/S2 without murmurs, rubs or gallops. Abdomen: Soft, non-tender, non-distended with normal bowel sounds. Musculoskeletal: No clubbing, cyanosis or edema bilaterally. Full range of motion without deformity. Skin: Skin color, texture, turgor normal.  No rashes or lesions. Neurologic:  Neurovascularly intact without any focal sensory/motor deficits. Cranial nerves: II-XII intact, grossly non-focal.  Psychiatric: Alert and oriented, thought content appropriate, normal insight  Capillary Refill: Brisk,3 seconds, normal   Peripheral Pulses: +2 palpable, equal bilaterally       Labs:   Recent Labs     02/21/22  1339 02/22/22  0357   WBC 24.2* 12.7*   HGB 15.4 12.9*   HCT 51.9 38.4*   * 299     Recent Labs     02/22/22  0357 02/22/22  0744 02/22/22  1130   * 133* 132*   K 3.5 3.7 3.6    102 102   CO2 17* 19* 18*   BUN 16 13 11   CREATININE 1.0 0.9 0.9   CALCIUM 8.8 8.9 8.5   PHOS 1.7* 1.5* 1.8*     Recent Labs     02/21/22  1339 02/22/22  0357   AST 9* 10*   ALT 15 10   BILIDIR  --  <0.2   BILITOT 0.3 0.3   ALKPHOS 173* 113     No results for input(s): INR in the last 72 hours.   Recent Labs     02/21/22  1339   TROPONINI <0.01       Urinalysis:      Lab Results   Component Value Date    NITRU Negative 02/21/2022    WBCUA 0-2 02/21/2022    BACTERIA 1+ 02/21/2022    RBCUA 3-4 02/21/2022    BLOODU SMALL 02/21/2022    SPECGRAV 1.025 02/21/2022    GLUCOSEU >=1000 02/21/2022    GLUCOSEU NEGATIVE 02/23/2011       Radiology:  XR ABDOMEN (KUB) (SINGLE AP VIEW)   Final Result   Nonobstructive bowel gas pattern with no significant stool. CT ABDOMEN PELVIS WO CONTRAST Additional Contrast? None   Final Result   1. Minimal possibly liquid stool that can be seen with diarrhea. However, no   associated findings of enterocolitis are present. 2. Hepatic steatosis. XR CHEST PORTABLE   Final Result   No acute cardiopulmonary process. Assessment/Plan:    Active Hospital Problems    Diagnosis     Diabetic keto-acidosis ([address] [E11.10]        DKA:  S/p insulin gtt. To SQ insulin today. IVF adjusted. Lab work adjusted. DMI -   Please consider ordering OP diabetes education. His A1c was 11.2 in Sept.   I am rechecking his A1c. Acute diarrhea:  <7-day onset  C. Difficile pending - no BM since admission and CT abd pelvis reassuring. Covid NAAT negative, PCR pending       Acute renal failure: Suspected prerenal from dehydration  Improved with IVF.        Sepsis without clear cause - possibly viral process vs due to DKA. Chest x-ray negative, UA unrevealing  Blood cultures pending, lactic acidosis - resolved  Pro-Audie elevated  Ceftriaxone and flagyl empiric - deescalate rapidly if cultures remain negative next 24hrs. .          Hiccups: Baclofen ordered       Acute cough: Covid PCR pending, NAAT negative. Tessalon PRN       Essential Hypertension: Continue home medication  Hyperlipidemia: Continue home medication        Diet: to CCD.      DVT Prophylaxis: lovenox     Dispo:  Ok for med surg.         Dana Rodriguez MD

## 2022-02-22 NOTE — H&P
Hospital Medicine History & Physical      PCP: Lucy Bermeo MD    Date of Admission: 2/21/2022    Date of Service: Pt seen/examined on 2/21/2022    Pt seen/examined face to face on and admitted as inpatient with expected LOS greater than two midnights due to medical therapy      Chief Complaint:    Chief Complaint   Patient presents with    Hyperglycemia     Pt came into ED from home for hyperglycemia. Pt states that his BG last night was 103 and this morning it was high. Pt states that he started feeling bad on Friday. History Of Present Illness:      43 y.o. male who presented to [facility] with past medical history of hypertension, hyperlipidemia, type 2 diabetes insulin-dependent, celiac disease presented to the ED with chief complaint of elevated blood sugar, nausea and vomiting and diarrhea. Patient reported for the past 5 days has been having some nausea vomiting and diarrhea. Patient reported having 3 bowel movements minimum per day that is watery nonbloody. Patient reported no known alleviating or exacerbating factors. Patient denied undercooked food, sick contacts, NSAIDs intake. Patient reported due to the nausea vomiting diarrhea he has not been taking his insulin or has been taking it intermittently depending on the blood sugar. Due to patient continued worsening and elevated blood sugar came to the ER for further evaluation. No associate with fever chills dysuria.   Patient does report having chest pain that is pleuritic and with breathing in addition has been having epigastric abdominal pain      Past Medical History:          Diagnosis Date    Anesthesia complication     BLOOD PRESSURE DROP IN OR DURING THR    Celiac disease     Dermatomyositis ([address]     Last attack 2009    Diabetes mellitus ([address]     TYPE I    Fibromyalgia     Hyperlipidemia     Hypertension     Juvenile rheumatoid arthritis ([address]     Movement disorder     DERMATOMYOSITIS/IN REMISSION    Rheumatoid arthritis(714.0)     Vertebral basilar insufficiency     symptoms related to JRA in cervical area    Wears glasses        Past Surgical History:          Procedure Laterality Date    HIP SURGERY      bilateral    JOINT REPLACEMENT Bilateral     THR    JOINT REPLACEMENT Left 6/3/2016    Revision left lateral total hip replacement    KNEE ARTHROSCOPY Right     KNEE ARTHROSCOPY Left 9/25/2013    medial & lateral Meniscectomy    LIVER BIOPSY      MUSCLE BIOPSY      TYMPANOSTOMY TUBE PLACEMENT      WRIST SURGERY Right     CYST       Medications Prior to Admission:      Prior to Admission medications    Medication Sig Start Date End Date Taking? Authorizing Provider   insulin aspart (NOVOLOG FLEXPEN) 100 UNIT/ML injection pen INJECT 12 U BEFORE MEALS AS DIRECTED. 9/14/21   Brittnee Gaitan MD   insulin detemir (LEVEMIR FLEXTOUCH) 100 UNIT/ML injection pen Inject 20 Units into the skin 2 times daily 9/14/21   Brittnee Gaitan MD   buPROPion (WELLBUTRIN XL) 150 MG extended release tablet Take 150 mg by mouth every morning    Historical Provider, MD   rosuvastatin (CRESTOR) 10 MG tablet Take 10 mg by mouth daily    Historical Provider, MD   buPROPion (WELLBUTRIN XL) 300 MG extended release tablet Take 300 mg by mouth every morning    Historical Provider, MD   VRAYLAR 6 MG CAPS capsule  8/24/21   Historical Provider, MD   topiramate (TOPAMAX) 50 MG tablet TAKE 1 TABLET BY MOUTH EVERYDAY AT BEDTIME 8/14/21   Historical Provider, MD   Pantoprazole Sodium (PROTONIX) 40 MG PACK packet Take 40 mg by mouth daily. Historical Provider, MD   pregabalin (LYRICA) 150 MG capsule Take 300 mg by mouth 2 times daily. Historical Provider, MD   hydroxychloroquine (PLAQUENIL) 200 MG tablet Take 200 mg by mouth 2 times daily. Historical Provider, MD       Allergies:  Aspirin    Social History:          TOBACCO:   reports that he has never smoked.  He has never used smokeless tobacco.  ETOH: reports current alcohol use. E-Cigarettes/Vaping Use     Questions Responses    E-Cigarette/Vaping Use Never User    Start Date     Passive Exposure     Quit Date     Counseling Given     Comments             Family History:      Reviewed in detail, and noncontributory        Problem Relation Age of Onset    High Blood Pressure Mother     Diabetes Father     Heart Disease Maternal Grandmother        REVIEW OF SYSTEMS:     Constitutional:  No Fever, No Chills, No Night Sweats  ENT/Mouth:  No Nasal Congestion,  No Hoarseness, No new mouth lesion  Eyes:  No Eye Pain, No Redness, No Discharge  Cardiovascular: + Chest Pain, No Orthopnea, No Palpitations  Respiratory: + Cough, No Sputum, No Dyspnea  Gastrointestinal: + Vomiting, + diarrhea, + abdominal pain  Genitourinary: No Urinary Frequency, No Hematuria, No Urinary pain  Musculoskeletal:  No worsening Arthralgias, No worsening Myalgias  Skin:  No new Skin Lesions, No new skin rash  Neuro:  No new weakness, No new numbness. Psych:  No suicial ideation, No Violence ideation    PHYSICAL EXAM PERFORMED:    BP (!) 148/89   Pulse 112   Temp 98.6 °F (37 °C) (Oral)   Resp 29   Ht 5' 9\" (1.753 m)   Wt 192 lb (87.1 kg)   SpO2 98%   BMI 28.35 kg/m²     General appearance:  mild acute distress, appears older than stated age  HEENT:   atraumatic, sclera anicteric, Conjunctivae clear. Neck: Supple,Trachea midline, no goiter  Respiratory:minimal accessory muscle usage, Normal respiratory effort. Clear to auscultation, bilaterally without wheezing  Cardiovascular:  Regular rate and rhythm, capillary refill 2 seconds  Abdomen: Soft, non-tender, non-distended with normal bowel sounds. Musculoskeletal:  No clubbing, cyanosis. trace edema LE bilaterally. Skin: turgor normal.  No new rashes or lesions. Neurologic: Alert and oriented x4, no new focal sensory/motor deficits.      Labs:     Recent Labs     02/21/22  1339   WBC 24.2*   HGB 15.4   HCT 51.9   *     Recent Labs     02/21/22  1339 02/21/22  1724 02/21/22  1942   * 133* 133*   K 5.7* 4.3 4.0   CL 81* 98* 102   CO2 5* 10* 12*   BUN 32* 27* 24*   CREATININE 2.2* 1.7* 1.3   CALCIUM 9.9 8.8 8.9   PHOS  --   --  1.3*     Recent Labs     02/21/22  1339   AST 9*   ALT 15   BILITOT 0.3   ALKPHOS 173*     No results for input(s): INR in the last 72 hours. Recent Labs     02/21/22  1339   TROPONINI <0.01       Urinalysis:      Lab Results   Component Value Date    NITRU Negative 02/21/2022    WBCUA 0-2 02/21/2022    BACTERIA 1+ 02/21/2022    RBCUA 3-4 02/21/2022    BLOODU SMALL 02/21/2022    SPECGRAV 1.025 02/21/2022    GLUCOSEU >=1000 02/21/2022    GLUCOSEU NEGATIVE 02/23/2011       Radiology:     CXR: I have reviewed the CXR with the following interpretation:   No acute process  EKG:  I have reviewed the EKG with the following interpretation:   Sinus tachycardia   XR CHEST PORTABLE   Final Result   No acute cardiopulmonary process. XR ABDOMEN (KUB) (SINGLE AP VIEW)    (Results Pending)       ASSESSMENT AND PLAN:    Active Hospital Problems    Diagnosis Date Noted    Diabetic keto-acidosis ([facility] [E11.10] 02/26/2016     DKA:  DKA protocol initiated  Insulin, IVF, BMP, electrolytes every 4 hours    Acute diarrhea:  <7-day onset  C. difficile, Covid pending    Acute renal failure:   Suspected prerenal from dehydration  IVF and recheck    Sepsis without clear cause:  Chest x-ray negative, UA unrevealing  Blood cultures pending, lactic acidosis  Pro-Audie elevated  Ceftriaxone and flagyl for suspected intraabdominal  CT abdomen/pevis pending    Hiccups: Baclofen ordered    Acute cough: Covid pending  Tessalon Perles    Essential Hypertension: Continue home medication  Hyperlipidemia: Continue home medication      Diet: NPO    DVT Prophylaxis: lovenox    Dispo:   [facility]

## 2022-02-22 NOTE — PROGRESS NOTES
Sub Q lantus and humalog provided per MD orders. Will DC insulin gtt in approx 1 hour. Pt denies any nausea or discomfort at this time. Will continue to monitor.

## 2022-02-23 LAB
ALBUMIN SERPL-MCNC: 3.8 G/DL (ref 3.4–5)
ANION GAP SERPL CALCULATED.3IONS-SCNC: 10 MMOL/L (ref 3–16)
ANION GAP SERPL CALCULATED.3IONS-SCNC: 12 MMOL/L (ref 3–16)
BASOPHILS ABSOLUTE: 0 K/UL (ref 0–0.2)
BASOPHILS RELATIVE PERCENT: 0.5 %
BUN BLDV-MCNC: 11 MG/DL (ref 7–20)
BUN BLDV-MCNC: 8 MG/DL (ref 7–20)
CALCIUM SERPL-MCNC: 8.5 MG/DL (ref 8.3–10.6)
CALCIUM SERPL-MCNC: 8.8 MG/DL (ref 8.3–10.6)
CHLORIDE BLD-SCNC: 101 MMOL/L (ref 99–110)
CHLORIDE BLD-SCNC: 104 MMOL/L (ref 99–110)
CO2: 21 MMOL/L (ref 21–32)
CO2: 23 MMOL/L (ref 21–32)
CREAT SERPL-MCNC: 0.7 MG/DL (ref 0.9–1.3)
CREAT SERPL-MCNC: 0.7 MG/DL (ref 0.9–1.3)
EOSINOPHILS ABSOLUTE: 0 K/UL (ref 0–0.6)
EOSINOPHILS RELATIVE PERCENT: 0.7 %
ESTIMATED AVERAGE GLUCOSE: 226 MG/DL
GFR AFRICAN AMERICAN: >60
GFR AFRICAN AMERICAN: >60
GFR NON-AFRICAN AMERICAN: >60
GFR NON-AFRICAN AMERICAN: >60
GLUCOSE BLD-MCNC: 255 MG/DL (ref 70–99)
GLUCOSE BLD-MCNC: 268 MG/DL (ref 70–99)
GLUCOSE BLD-MCNC: 295 MG/DL (ref 70–99)
GLUCOSE BLD-MCNC: 314 MG/DL (ref 70–99)
GLUCOSE BLD-MCNC: 336 MG/DL (ref 70–99)
GLUCOSE BLD-MCNC: 344 MG/DL (ref 70–99)
HBA1C MFR BLD: 9.5 %
HCT VFR BLD CALC: 36.4 % (ref 40.5–52.5)
HEMOGLOBIN: 11.9 G/DL (ref 13.5–17.5)
LYMPHOCYTES ABSOLUTE: 1.5 K/UL (ref 1–5.1)
LYMPHOCYTES RELATIVE PERCENT: 25.1 %
MAGNESIUM: 1.9 MG/DL (ref 1.8–2.4)
MAGNESIUM: 1.9 MG/DL (ref 1.8–2.4)
MCH RBC QN AUTO: 27.7 PG (ref 26–34)
MCHC RBC AUTO-ENTMCNC: 32.8 G/DL (ref 31–36)
MCV RBC AUTO: 84.3 FL (ref 80–100)
MONOCYTES ABSOLUTE: 0.7 K/UL (ref 0–1.3)
MONOCYTES RELATIVE PERCENT: 11.6 %
NEUTROPHILS ABSOLUTE: 3.7 K/UL (ref 1.7–7.7)
NEUTROPHILS RELATIVE PERCENT: 62.1 %
PDW BLD-RTO: 13.7 % (ref 12.4–15.4)
PERFORMED ON: ABNORMAL
PHOSPHORUS: 2.1 MG/DL (ref 2.5–4.9)
PLATELET # BLD: 229 K/UL (ref 135–450)
PMV BLD AUTO: 7.8 FL (ref 5–10.5)
POTASSIUM REFLEX MAGNESIUM: 3.4 MMOL/L (ref 3.5–5.1)
POTASSIUM SERPL-SCNC: 3.6 MMOL/L (ref 3.5–5.1)
RBC # BLD: 4.31 M/UL (ref 4.2–5.9)
SODIUM BLD-SCNC: 135 MMOL/L (ref 136–145)
SODIUM BLD-SCNC: 136 MMOL/L (ref 136–145)
WBC # BLD: 6 K/UL (ref 4–11)

## 2022-02-23 PROCEDURE — 80069 RENAL FUNCTION PANEL: CPT

## 2022-02-23 PROCEDURE — C9113 INJ PANTOPRAZOLE SODIUM, VIA: HCPCS | Performed by: INTERNAL MEDICINE

## 2022-02-23 PROCEDURE — 2580000003 HC RX 258: Performed by: INTERNAL MEDICINE

## 2022-02-23 PROCEDURE — 1200000000 HC SEMI PRIVATE

## 2022-02-23 PROCEDURE — 6370000000 HC RX 637 (ALT 250 FOR IP): Performed by: INTERNAL MEDICINE

## 2022-02-23 PROCEDURE — 83735 ASSAY OF MAGNESIUM: CPT

## 2022-02-23 PROCEDURE — 85025 COMPLETE CBC W/AUTO DIFF WBC: CPT

## 2022-02-23 PROCEDURE — 99232 SBSQ HOSP IP/OBS MODERATE 35: CPT | Performed by: INTERNAL MEDICINE

## 2022-02-23 PROCEDURE — 2500000003 HC RX 250 WO HCPCS: Performed by: INTERNAL MEDICINE

## 2022-02-23 PROCEDURE — 6360000002 HC RX W HCPCS

## 2022-02-23 PROCEDURE — 6360000002 HC RX W HCPCS: Performed by: INTERNAL MEDICINE

## 2022-02-23 PROCEDURE — A4216 STERILE WATER/SALINE, 10 ML: HCPCS | Performed by: INTERNAL MEDICINE

## 2022-02-23 PROCEDURE — 36415 COLL VENOUS BLD VENIPUNCTURE: CPT

## 2022-02-23 RX ORDER — INSULIN GLARGINE 100 [IU]/ML
20 INJECTION, SOLUTION SUBCUTANEOUS ONCE
Status: COMPLETED | OUTPATIENT
Start: 2022-02-23 | End: 2022-02-23

## 2022-02-23 RX ADMIN — LISINOPRIL 30 MG: 10 TABLET ORAL at 08:17

## 2022-02-23 RX ADMIN — INSULIN GLARGINE 20 UNITS: 100 INJECTION, SOLUTION SUBCUTANEOUS at 21:09

## 2022-02-23 RX ADMIN — FAMOTIDINE 20 MG: 20 TABLET ORAL at 08:17

## 2022-02-23 RX ADMIN — SODIUM CHLORIDE: 9 INJECTION, SOLUTION INTRAVENOUS at 06:05

## 2022-02-23 RX ADMIN — TOPIRAMATE 50 MG: 25 TABLET, FILM COATED ORAL at 21:01

## 2022-02-23 RX ADMIN — PREGABALIN 300 MG: 100 CAPSULE ORAL at 08:16

## 2022-02-23 RX ADMIN — FAMOTIDINE 20 MG: 20 TABLET ORAL at 21:05

## 2022-02-23 RX ADMIN — METRONIDAZOLE 500 MG: 500 INJECTION, SOLUTION INTRAVENOUS at 00:05

## 2022-02-23 RX ADMIN — HYDROXYCHLOROQUINE SULFATE 200 MG: 200 TABLET ORAL at 08:17

## 2022-02-23 RX ADMIN — INSULIN LISPRO 8 UNITS: 100 INJECTION, SOLUTION INTRAVENOUS; SUBCUTANEOUS at 08:22

## 2022-02-23 RX ADMIN — BENZONATATE 200 MG: 100 CAPSULE ORAL at 21:05

## 2022-02-23 RX ADMIN — BENZONATATE 200 MG: 100 CAPSULE ORAL at 08:17

## 2022-02-23 RX ADMIN — PANTOPRAZOLE SODIUM 40 MG: 40 INJECTION, POWDER, FOR SOLUTION INTRAVENOUS at 08:17

## 2022-02-23 RX ADMIN — INSULIN LISPRO 6 UNITS: 100 INJECTION, SOLUTION INTRAVENOUS; SUBCUTANEOUS at 18:00

## 2022-02-23 RX ADMIN — ACETAMINOPHEN 650 MG: 325 TABLET ORAL at 10:43

## 2022-02-23 RX ADMIN — ENOXAPARIN SODIUM 40 MG: 100 INJECTION SUBCUTANEOUS at 08:16

## 2022-02-23 RX ADMIN — BUPROPION HYDROCHLORIDE 300 MG: 150 TABLET, EXTENDED RELEASE ORAL at 08:16

## 2022-02-23 RX ADMIN — ROSUVASTATIN CALCIUM 10 MG: 10 TABLET, FILM COATED ORAL at 08:17

## 2022-02-23 RX ADMIN — HYDROXYCHLOROQUINE SULFATE 200 MG: 200 TABLET ORAL at 21:05

## 2022-02-23 RX ADMIN — PREGABALIN 300 MG: 100 CAPSULE ORAL at 21:05

## 2022-02-23 RX ADMIN — INSULIN LISPRO 3 UNITS: 100 INJECTION, SOLUTION INTRAVENOUS; SUBCUTANEOUS at 21:09

## 2022-02-23 RX ADMIN — INSULIN LISPRO 6 UNITS: 100 INJECTION, SOLUTION INTRAVENOUS; SUBCUTANEOUS at 12:26

## 2022-02-23 RX ADMIN — SODIUM CHLORIDE 10 ML: 9 INJECTION INTRAMUSCULAR; INTRAVENOUS; SUBCUTANEOUS at 08:17

## 2022-02-23 RX ADMIN — BENZONATATE 200 MG: 100 CAPSULE ORAL at 15:28

## 2022-02-23 RX ADMIN — METRONIDAZOLE 500 MG: 500 INJECTION, SOLUTION INTRAVENOUS at 06:06

## 2022-02-23 RX ADMIN — INSULIN GLARGINE 20 UNITS: 100 INJECTION, SOLUTION SUBCUTANEOUS at 08:29

## 2022-02-23 RX ADMIN — CARIPRAZINE 6 MG: 3 CAPSULE, GELATIN COATED ORAL at 10:35

## 2022-02-23 ASSESSMENT — PAIN SCALES - GENERAL
PAINLEVEL_OUTOF10: 8
PAINLEVEL_OUTOF10: 0

## 2022-02-23 NOTE — CARE COORDINATION
Case Management Assessment  Initial Evaluation      Patient Name: Yash Brown  YOB: 1985  Diagnosis: Lactic acid acidosis [E87.2]  Diabetic ketoacidosis without coma associated with type 1 diabetes mellitus ([facility] [E10.10]  Date / Time: 2/21/2022  1:10 PM    Admission status/Date:INPT 2/21/22  Chart Reviewed: Yes      Patient Interviewed: Yes   Family Interviewed:  No      Hospitalization in the last 30 days:  No      Health Care Decision Maker :   Primary Decision Maker: [address] - [phone]    (CM - must 1st enter selection under Navigator - emergency contact- [phone] Relationship and pick relationship)   Who do you trust or have selected to make healthcare decisions for you      Met with: pt at bedside  Interview conducted  (bedside/phone):    Current PCP: Kayleigh King 18 required for SNF : Y, N          3 night stay required - Y, [employer]  Support Systems/Care Needs:    Transportation: family    Meal Preparation: self    Housing  Living Arrangements: home with wife and children  Steps: 6  Intent for return to present living arrangements: Yes  Identified Issues: non-compliant with diabetes diet/meds    Home Care Information  Active with [address] : No Agency:(Services)     Passport/Waiver : No  :                      Phone Number:    Passport/Waiver Services: -          Durable Medical Equiptment   DME Provider: n/a  Equipment: n/a  Walker___Cane___RTS___ BSC___Shower Chair___Hospital Bed___W/C____Other________  02 at ____Liter(s)---wears(frequency)_______ HHN ___ CPAP___ BiPap___   N/A____      Home O2 Use :  No    If No for home O2---if presently on O2 during hospitalization:  No  if yes CM to follow for potential DC O2 need  Informed of need for care provider to bring portable home O2 tank on day of discharge for nursing to connect prior to leaving:   Not Indicated  Verbalized agreement/Understanding: Not Indicated    Community Service Affiliation  Dialysis:  No    · Agency:  · Location:  · Dialysis Schedule:  · Phone:   · Fax: Other Community Services: (ex:PT/OT,Mental Health,Wound Clinic, Cardio/Pul [address])    DISCHARGE PLAN: Explained Case Management role/services. Reviewed chart and met with pt. Pt reports to live at home with his family. Pt states that he has had difficulty knowing what a proper diabetic diet is and knowing how much sliding scale insulin to give himself. Dr. Jojo Bettencourt aware. Nurse to reinforce teaching. Referral made to dietician for diabetic teaching. Pt states he will return home at discharge. Denies need for hhc. CM will cont to follow.

## 2022-02-23 NOTE — PROGRESS NOTES
Hospitalist Progress Note      PCP: Emerson Acosta MD    Date of Admission: 2/21/2022    Chief Complaint: diarrhea, emesis, from home. Based on my conversation with him I am concerned he is not very compliant with his insulin at home. He feels that he gets confused about sliding scale instructions and how much insulin to give himself. I think he will benefit greatly from formal diabetes education OP after discharge. Subjective:     He states his pain and nausea worsens with eating. He tried eating a Hamburger last night and he states he doesn't feel well eating breakfast this morning.         Medications:  Reviewed    Infusion Medications    dextrose      sodium chloride       Scheduled Medications    insulin glargine  20 Units SubCUTAneous QAM AC    insulin lispro  0-12 Units SubCUTAneous TID WC    insulin lispro  0-6 Units SubCUTAneous Nightly    cariprazine hcl  6 mg Oral Daily    lisinopril  30 mg Oral Daily    topiramate  50 mg Oral Nightly    pregabalin  300 mg Oral BID    enoxaparin  40 mg SubCUTAneous Daily    sodium chloride flush  10 mL IntraVENous 2 times per day    pantoprazole  40 mg IntraVENous Daily    And    sodium chloride (PF)  10 mL IntraVENous Daily    famotidine  20 mg Oral BID    benzonatate  200 mg Oral TID    hydroxychloroquine  200 mg Oral BID    rosuvastatin  10 mg Oral Daily    buPROPion  300 mg Oral QAM     PRN Meds: glucose, glucagon (rDNA), dextrose, dextrose bolus (hypoglycemia) **OR** dextrose bolus (hypoglycemia), potassium chloride, magnesium sulfate, sodium phosphate IVPB **OR** sodium phosphate IVPB **OR** sodium phosphate IVPB, polyethylene glycol, sodium chloride flush, sodium chloride, promethazine **OR** ondansetron, acetaminophen **OR** acetaminophen      Intake/Output Summary (Last 24 hours) at 2/23/2022 0824  Last data filed at 2/23/2022 7841  Gross per 24 hour   Intake 240 ml   Output 2350 ml   Net -2110 ml       Physical Exam Performed:    BP (!) 147/92   Pulse 99   Temp 97.5 °F (36.4 °C) (Oral)   Resp 16   Ht 5' 9\" (1.753 m)   Wt 195 lb 9.6 oz (88.7 kg)   SpO2 98%   BMI 28.89 kg/m²     General appearance: No apparent distress, appears stated age and cooperative. HEENT: Pupils equal, round, and reactive to light. Conjunctivae/corneas clear. Neck: Supple, with full range of motion. No jugular venous distention. Trachea midline. Respiratory:  Normal respiratory effort. Clear to auscultation, bilaterally without Rales/Wheezes/Rhonchi. Cardiovascular: Regular rate and rhythm with normal S1/S2 without murmurs, rubs or gallops. Abdomen: Soft, non-tender, non-distended with normal bowel sounds. Musculoskeletal: No clubbing, cyanosis or edema bilaterally. Full range of motion without deformity. Skin: Skin color, texture, turgor normal.  No rashes or lesions. Neurologic:  Neurovascularly intact without any focal sensory/motor deficits. Cranial nerves: II-XII intact, grossly non-focal.  Psychiatric: Alert and oriented, thought content appropriate, normal insight  Capillary Refill: Brisk,3 seconds, normal   Peripheral Pulses: +2 palpable, equal bilaterally       I Thi Wetzel MD have reviewed the chart on [facility] and personally interviewed and examined patient, reviewed the data (labs and imaging) and after discussion with my PA formulated the plan. Agree with note with the following edits. HPI:     I reviewed the patient's Past Medical History, Past Surgical History, Medications, and Allergies. Continues to feel nauseated but no vomiting  Reports he has hard time understanding sliding scale         General:  Awake, alert and oriented.  Appears to be not in any distress  Mucous Membranes:  Pink , anicteric  Neck: No JVD, no carotid bruit, no thyromegaly  Chest:  Clear to auscultation bilaterally, no added sounds  Cardiovascular:  RRR S1S2 heard, no murmurs or gallops  Abdomen:  Soft, undistended, non tender, no organomegaly, BS present  Extremities: diffuse IP joint arthritis of both hands  No edema or cyanosis. Distal pulses well felt  Neurological : grossly normal                      Labs:   Recent Labs     02/21/22  1339 02/22/22  0357 02/23/22  0454   WBC 24.2* 12.7* 6.0   HGB 15.4 12.9* 11.9*   HCT 51.9 38.4* 36.4*   * 299 229     Recent Labs     02/22/22  0357 02/22/22  0744 02/22/22  1130   * 133* 132*   K 3.5 3.7 3.6    102 102   CO2 17* 19* 18*   BUN 16 13 11   CREATININE 1.0 0.9 0.9   CALCIUM 8.8 8.9 8.5   PHOS 1.7* 1.5* 1.8*     Recent Labs     02/21/22  1339 02/22/22  0357   AST 9* 10*   ALT 15 10   BILIDIR  --  <0.2   BILITOT 0.3 0.3   ALKPHOS 173* 113     No results for input(s): INR in the last 72 hours. Recent Labs     02/21/22  1339   TROPONINI <0.01       Urinalysis:      Lab Results   Component Value Date    NITRU Negative 02/21/2022    WBCUA 0-2 02/21/2022    BACTERIA 1+ 02/21/2022    RBCUA 3-4 02/21/2022    BLOODU SMALL 02/21/2022    SPECGRAV 1.025 02/21/2022    GLUCOSEU >=1000 02/21/2022    GLUCOSEU NEGATIVE 02/23/2011     Cultures:  Blood: NGTD  C-diff: pending  COVID/influenza: not detected    Radiology:  XR ABDOMEN (KUB) (SINGLE AP VIEW)   Final Result   Nonobstructive bowel gas pattern with no significant stool. CT ABDOMEN PELVIS WO CONTRAST Additional Contrast? None   Final Result   1. Minimal possibly liquid stool that can be seen with diarrhea. However, no   associated findings of enterocolitis are present. 2. Hepatic steatosis. XR CHEST PORTABLE   Final Result   No acute cardiopulmonary process. Assessment/Plan:    Active Hospital Problems    Diagnosis     Diabetic keto-acidosis ([facility] [E11.10]             DKA:  S/p insulin gtt. Acidosis resolved. Corrected electrolytes  S.p IVF  Transitioned to SQ insulin and tolerating well  N/v resolved.  Tolerating diet  Adjust insulin as needed to keep sugars below 160  Diabetic education and need more compliance            DMI -   His A1c was 11.2 in Sept.   Uncontrolled , see above      Acute diarrhea:  <7-day onset  C. Difficile pending - no BM since admission and CT abd pelvis reassuring. Covid neg        Acute renal failure: Suspected prerenal from dehydration  Improved with IVF.         Sepsis without clear cause - possibly viral process vs symptoms due to DKA. Chest x-ray negative, UA unrevealing  Blood cultures pending, lactic acidosis - resolved  Pro-Audie elevated  Ceftriaxone and flagyl empiric -stop today with no evidence of infection  Sepsis ruled out favor DKA symptoms         Hiccups: likely with severe emesis . Resolved with supportive care         Acute cough: covid test neg. cxr neg. Likely viral process. 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  Resumed      Essential Hypertension: Continue home medication  Hyperlipidemia: Continue home medication     Schizoaffective disorder - resumed home meds      Diet: to CCD.      DVT Prophylaxis: lovenotianna Clark, FAIZAN - CNP   2/23/2022     Agree with above  Changes made to note    Evangelina Armenta MD,2/23/2022 6:47 PM

## 2022-02-23 NOTE — PROGRESS NOTES
Patient transferred to [facility] from ICU via wheelchair at 1740. Patient alert an oriented, denies complaints. Call light in reach.

## 2022-02-23 NOTE — PROGRESS NOTES
Report received from Niki Rodriguez RN. This RN introduced self. Call light within reach, bed alarm on and in the lowest position.

## 2022-02-23 NOTE — ACP (ADVANCE CARE PLANNING)
Advance Care Planning   Healthcare Decision Maker:    Primary Decision Maker: Lonni Canavan spouse - [phone]    Click here to complete Healthcare Decision Makers including selection of the Healthcare Decision Maker Relationship (ie \"Primary\").

## 2022-02-23 NOTE — PROGRESS NOTES
PULMONARY PROGRESS NOTE  Hospital Day: 3   CC: DKA    Subjective:   Transferred out of ICU. Midepigastric burning with eating, radiates diffusely throughout abdomen, similar to yesterday, tried to eat today and pain prevented. IV line: Peripheral    PHYSICAL EXAM:   BP (!) 147/92   Pulse 99   Temp 97.5 °F (36.4 °C) (Oral)   Resp 16   Ht 5' 9\" (1.753 m)   Wt 195 lb 9.6 oz (88.7 kg)   SpO2 98%   BMI 28.89 kg/m² ' on RA  Constitutional:  No acute distress   HEENT:  No scleral icterus  Neck:  No tracheal deviation present. Cardiovascular:  Normal heart sounds. Pulmonary/Chest:  No wheezes. No rhonchi. No rales. No decreased breath sounds. No accessory muscle usage or stridor. Abdominal:  Soft. NT, no rebound or guarding   Musculoskeletal:  No cyanosis. No clubbing. Skin:  Skin is warm and dry.      Scheduled Meds:   insulin glargine  20 Units SubCUTAneous QAM AC    insulin lispro  0-12 Units SubCUTAneous TID WC    insulin lispro  0-6 Units SubCUTAneous Nightly    cariprazine hcl  6 mg Oral Daily    lisinopril  30 mg Oral Daily    topiramate  50 mg Oral Nightly    pregabalin  300 mg Oral BID    enoxaparin  40 mg SubCUTAneous Daily    sodium chloride flush  10 mL IntraVENous 2 times per day    pantoprazole  40 mg IntraVENous Daily    And    sodium chloride (PF)  10 mL IntraVENous Daily    famotidine  20 mg Oral BID    benzonatate  200 mg Oral TID    hydroxychloroquine  200 mg Oral BID    rosuvastatin  10 mg Oral Daily    buPROPion  300 mg Oral QAM     Continuous Infusions:   dextrose      sodium chloride       PRN Meds:  glucose, glucagon (rDNA), dextrose, dextrose bolus (hypoglycemia) **OR** dextrose bolus (hypoglycemia), potassium chloride, magnesium sulfate, sodium phosphate IVPB **OR** sodium phosphate IVPB **OR** sodium phosphate IVPB, polyethylene glycol, sodium chloride flush, sodium chloride, promethazine **OR** ondansetron, acetaminophen **OR** acetaminophen    Labs:  CBC: Recent Labs     02/21/22  1339 02/22/22  0357 02/23/22  0454   WBC 24.2* 12.7* 6.0   HGB 15.4 12.9* 11.9*   HCT 51.9 38.4* 36.4*   MCV 93.4 84.2 84.3   * 299 229     BMP:   Recent Labs     02/22/22  0357 02/22/22  0744 02/22/22  1130   * 133* 132*   K 3.5 3.7 3.6    102 102   CO2 17* 19* 18*   PHOS 1.7* 1.5* 1.8*   BUN 16 13 11   CREATININE 1.0 0.9 0.9             Microbiology:  2/21 BC NGTD  2/21 COVID-19 not detected    Imaging:  Chest x-ray 2/21 imaging was reviewed by me and showed No acute cardiopulmonary disease     ASSESSMENT:  · DKA   · Acute metabolic encephalopathy - resolved  · Abdominal pain, epigastric burning  · History of  dermatomyositis, juvenile rheumatoid arthritis, celiac disease on Plaquenil     PLAN:    · Insulin per Internal Medicine   · On Pepcid & Protonix.  Additional evaluation of GI sx will be per Internal Medicine   · Call with questions

## 2022-02-23 NOTE — FLOWSHEET NOTE
02/23/22 1434   Vital Signs   Temp 96.9 °F (36.1 °C)   Temp Source Oral   Pulse 99   Resp 18   BP (!) 140/92   BP Location Right upper arm   Patient Position Semi fowlers   Level of Consciousness Alert (0)   MEWS Score 1   Patient Currently in Pain Denies   Pain Assessment   Pain Assessment 0-10   Pain Level 0   Oxygen Therapy   SpO2 98 %   O2 Device None (Room air)     Assessment completed and vital signs completed, see flowsheet. Patient is alert & oriented. No complaints voiced. Patient denies further needs. Side rails up X 2. Bed in the lowest position. Call light within reach.

## 2022-02-23 NOTE — PROGRESS NOTES
Morning med infusing. Patient rested throughout the night w/o concerns. Patient denies any needs at this time. Call light within reach.

## 2022-02-23 NOTE — PROGRESS NOTES
Shift assessment complete. See doc flow. Nightly medications given by another nurse Deborah HORTA see MAR. IVF infusing. A/O x4. Independent with ambulation. Call light and bedside table within easy reach.

## 2022-02-24 ENCOUNTER — TELEPHONE (OUTPATIENT)
Dept: INTERNAL MEDICINE CLINIC | Age: 37
End: 2022-02-24

## 2022-02-24 VITALS
SYSTOLIC BLOOD PRESSURE: 143 MMHG | HEART RATE: 99 BPM | OXYGEN SATURATION: 99 % | BODY MASS INDEX: 28.85 KG/M2 | TEMPERATURE: 97.9 F | DIASTOLIC BLOOD PRESSURE: 94 MMHG | HEIGHT: 69 IN | RESPIRATION RATE: 16 BRPM | WEIGHT: 194.8 LBS

## 2022-02-24 LAB
ALBUMIN SERPL-MCNC: 3.7 G/DL (ref 3.4–5)
ANION GAP SERPL CALCULATED.3IONS-SCNC: 12 MMOL/L (ref 3–16)
BUN BLDV-MCNC: 11 MG/DL (ref 7–20)
CALCIUM SERPL-MCNC: 8.8 MG/DL (ref 8.3–10.6)
CHLORIDE BLD-SCNC: 101 MMOL/L (ref 99–110)
CO2: 24 MMOL/L (ref 21–32)
CREAT SERPL-MCNC: 0.7 MG/DL (ref 0.9–1.3)
GFR AFRICAN AMERICAN: >60
GFR NON-AFRICAN AMERICAN: >60
GLUCOSE BLD-MCNC: 197 MG/DL (ref 70–99)
GLUCOSE BLD-MCNC: 216 MG/DL (ref 70–99)
GLUCOSE BLD-MCNC: 266 MG/DL (ref 70–99)
GLUCOSE BLD-MCNC: 297 MG/DL (ref 70–99)
MAGNESIUM: 1.9 MG/DL (ref 1.8–2.4)
PERFORMED ON: ABNORMAL
PHOSPHORUS: 2.9 MG/DL (ref 2.5–4.9)
POTASSIUM SERPL-SCNC: 3.4 MMOL/L (ref 3.5–5.1)
SODIUM BLD-SCNC: 137 MMOL/L (ref 136–145)

## 2022-02-24 PROCEDURE — 6370000000 HC RX 637 (ALT 250 FOR IP): Performed by: INTERNAL MEDICINE

## 2022-02-24 PROCEDURE — 6360000002 HC RX W HCPCS: Performed by: INTERNAL MEDICINE

## 2022-02-24 PROCEDURE — 36415 COLL VENOUS BLD VENIPUNCTURE: CPT

## 2022-02-24 PROCEDURE — A4216 STERILE WATER/SALINE, 10 ML: HCPCS | Performed by: INTERNAL MEDICINE

## 2022-02-24 PROCEDURE — 2580000003 HC RX 258: Performed by: INTERNAL MEDICINE

## 2022-02-24 PROCEDURE — 80069 RENAL FUNCTION PANEL: CPT

## 2022-02-24 PROCEDURE — 6360000002 HC RX W HCPCS

## 2022-02-24 PROCEDURE — 83735 ASSAY OF MAGNESIUM: CPT

## 2022-02-24 PROCEDURE — 6370000000 HC RX 637 (ALT 250 FOR IP): Performed by: NURSE PRACTITIONER

## 2022-02-24 PROCEDURE — 99239 HOSP IP/OBS DSCHRG MGMT >30: CPT | Performed by: INTERNAL MEDICINE

## 2022-02-24 PROCEDURE — C9113 INJ PANTOPRAZOLE SODIUM, VIA: HCPCS | Performed by: INTERNAL MEDICINE

## 2022-02-24 RX ORDER — ONDANSETRON 4 MG/1
4 TABLET, ORALLY DISINTEGRATING ORAL EVERY 8 HOURS PRN
Qty: 10 TABLET | Refills: 0 | Status: SHIPPED | OUTPATIENT
Start: 2022-02-24

## 2022-02-24 RX ORDER — INSULIN DETEMIR 100 [IU]/ML
INJECTION, SOLUTION SUBCUTANEOUS
Qty: 5 PEN | Refills: 0 | Status: SHIPPED | OUTPATIENT
Start: 2022-02-24

## 2022-02-24 RX ORDER — INSULIN ASPART 100 [IU]/ML
15 INJECTION, SOLUTION INTRAVENOUS; SUBCUTANEOUS
Qty: 5 PEN | Refills: 0 | Status: SHIPPED | OUTPATIENT
Start: 2022-02-24 | End: 2022-02-24 | Stop reason: SDUPTHER

## 2022-02-24 RX ORDER — POTASSIUM CHLORIDE 20 MEQ/1
40 TABLET, EXTENDED RELEASE ORAL ONCE
Status: COMPLETED | OUTPATIENT
Start: 2022-02-24 | End: 2022-02-24

## 2022-02-24 RX ORDER — LISINOPRIL 30 MG/1
30 TABLET ORAL DAILY
Qty: 30 TABLET | Refills: 3 | Status: SHIPPED | OUTPATIENT
Start: 2022-02-25

## 2022-02-24 RX ORDER — INSULIN ASPART 100 [IU]/ML
15 INJECTION, SOLUTION INTRAVENOUS; SUBCUTANEOUS
Qty: 5 PEN | Refills: 0 | Status: SHIPPED | OUTPATIENT
Start: 2022-02-24

## 2022-02-24 RX ORDER — BENZONATATE 200 MG/1
200 CAPSULE ORAL 3 TIMES DAILY
Qty: 21 CAPSULE | Refills: 0 | Status: SHIPPED | OUTPATIENT
Start: 2022-02-24 | End: 2022-03-03

## 2022-02-24 RX ADMIN — POTASSIUM CHLORIDE 40 MEQ: 1500 TABLET, EXTENDED RELEASE ORAL at 09:13

## 2022-02-24 RX ADMIN — ONDANSETRON HYDROCHLORIDE 4 MG: 2 INJECTION, SOLUTION INTRAMUSCULAR; INTRAVENOUS at 08:28

## 2022-02-24 RX ADMIN — CARIPRAZINE 6 MG: 3 CAPSULE, GELATIN COATED ORAL at 08:42

## 2022-02-24 RX ADMIN — PANTOPRAZOLE SODIUM 40 MG: 40 INJECTION, POWDER, FOR SOLUTION INTRAVENOUS at 08:28

## 2022-02-24 RX ADMIN — ENOXAPARIN SODIUM 40 MG: 100 INJECTION SUBCUTANEOUS at 08:34

## 2022-02-24 RX ADMIN — PREGABALIN 300 MG: 100 CAPSULE ORAL at 08:29

## 2022-02-24 RX ADMIN — BENZONATATE 200 MG: 100 CAPSULE ORAL at 14:02

## 2022-02-24 RX ADMIN — INSULIN LISPRO 4 UNITS: 100 INJECTION, SOLUTION INTRAVENOUS; SUBCUTANEOUS at 12:32

## 2022-02-24 RX ADMIN — INSULIN GLARGINE 20 UNITS: 100 INJECTION, SOLUTION SUBCUTANEOUS at 08:37

## 2022-02-24 RX ADMIN — FAMOTIDINE 20 MG: 20 TABLET ORAL at 08:29

## 2022-02-24 RX ADMIN — BUPROPION HYDROCHLORIDE 300 MG: 150 TABLET, EXTENDED RELEASE ORAL at 08:29

## 2022-02-24 RX ADMIN — HYDROXYCHLOROQUINE SULFATE 200 MG: 200 TABLET ORAL at 08:30

## 2022-02-24 RX ADMIN — BENZONATATE 200 MG: 100 CAPSULE ORAL at 08:30

## 2022-02-24 RX ADMIN — LISINOPRIL 30 MG: 10 TABLET ORAL at 08:29

## 2022-02-24 RX ADMIN — INSULIN LISPRO 6 UNITS: 100 INJECTION, SOLUTION INTRAVENOUS; SUBCUTANEOUS at 08:37

## 2022-02-24 RX ADMIN — SODIUM CHLORIDE 10 ML: 9 INJECTION INTRAMUSCULAR; INTRAVENOUS; SUBCUTANEOUS at 08:28

## 2022-02-24 RX ADMIN — Medication 10 ML: at 02:40

## 2022-02-24 RX ADMIN — ROSUVASTATIN CALCIUM 10 MG: 10 TABLET, FILM COATED ORAL at 08:30

## 2022-02-24 NOTE — PROGRESS NOTES
Pt alert and oriented x4 in bed, pt c/o cough, pt has dry persistent cough, gave  tessalon jennyfer's, pt stated tessalon perles not working, sent perfect serve to Dr. Iraj Ba requesting cough syrup, vitals and shift assessment completed, medications and insulin given,vss, will continue to monitor.  Yobany Herrera RN

## 2022-02-24 NOTE — PROGRESS NOTES
Discharge prescriptions called to [facility] per pt request. [facility].

## 2022-02-24 NOTE — PROGRESS NOTES
Nutrition Education    Consult received for diabetes nutrition education. Patient has had type I diabetes for ~ 10 years and it has not been very controlled during that time. Patient has seen an endocrinologist in the past but would like to look for a new one at this time. Patient couldn't remember the last time he had visited the endocrinologist. Patient takes 30 units of Steve Ascencion in the morning and evening + he uses Novalog for sliding scale at home. Patient reported that he typically gives himself too much or too little of his short-acting insulin for meal coverage and that his blood glucose trends typically run high (200's to 300's). Patient stated that he will sometimes consume yoplait yogurt  (1-2 cups) in the morning around 9 am, if he consumes something for breakfast. Patient consumes lunch around noon and may have a sandwich with 2 pieces of bread, lunch meat, 1 piece of cheese, lettuce, tomato, and serrano + some chips (he doesn't have chips all of the time). Patient may consume french fries for lunch sometimes. Patient enjoys cutting up iceburg lettuce and dipping it in balsamic dressing for a snack. He does like raw vegetables and eats them often. Patient does not eat much candy. He drinks diet pop (~ 3 cans per day), diet juice, and whole milk during the day. He probably consumes ~ 2 1/2 cups of whole milk per day. He doesn't drink water because he does not like the way the tap water tastes in his home + he does not purchase bottled water. Patient consumes dinner items like meatloaf + mashed potatoes, salmon, steaks, corn, and he uses a lot of margarine when preparing meals. Patient does make a lot of meals at home but he does eat outside of the home occasionally.  Patient asked about eating at [facility] and this RD suggested that patient consume a kids meal instead of an adult meal to reduce the amount of carbohydrates he is consuming in one meal. Patient reported that he likes to eat and that adhering to portion/serving sizes is hard for him. Patient may have cereal + milk for a snack after dinner and before bedtime (honey nut cheerios or raisin bran). Patient does not have a consistent meal/snack schedule. RD reviewed consistent meal/snack scenarios with patient. RD reviewed diabetes exchange list with patient and pointed out that this would be a good reference for patient until he has a better understanding of portion/serving size, especially with the foods/meals he consumes often. Patient asked a lot of good questions and did express that he is overwhelmed with all of the information. This RD explained to patient that if he is able to control his blood glucose at this point in his life, he will be able to slow the progression of his diabetes and the destruction that diabetes can cause if left uncontrolled. Patient is from home where he lives with his wife and children. This RD recommended including his wife in learning more about a diabetes dietary regimen and to help remind him to check his blood glucose trends + monitor his diabetes closer, at least until patient is able to do it on his own. Patient was very receptive to nutrition education and reported that he does want to help himself but that he gets confused with carbohydrates consumed, calculating carbohydrates for meals/snacks, and with his insulin coverage at mealtimes so that he doesn't give himself too much or too little insulin. RD recommended finding a new endocrinologist [facility] and finding outpatient diabetes nutrition education to help him continue to understand his condition and control it better. Patient is the only person in his family that has experienced type I diabetes. Patient checks hi blood glucose 2x per day most days (noon and 6 pm are the most consistent times). He would like to check his blood glucose 4x per day and this RD encouraged patient to work up to that goal and to create some consistency in his daily routine.  This RD reviewed the materials in nutrition folder and encouraged patient to call RD office after d/c, if need be, with any nutrition-related questions/concerns. Encouraged patient to speak with his RN about his d/c insulin regimen and to have it written down on one of the handouts provided so that patient understands his short-acting insulin regimen better. · Verbally reviewed information with PATIENT. · Educated on 2/24/22. · Written educational materials provided. · Contact name and number provided. · Refer to Patient Education activity for more details.     Electronically signed by Aldo Marsh RD, DAISY on 2/24/22 at 11:19 AM EST    Contact: [phone]

## 2022-02-24 NOTE — TELEPHONE ENCOUNTER
----- Message from Devan Maxwell MD sent at 2/24/2022  3:04 PM EST -----  Resend  He should be on both long acting and short acting unless he has them at home  ----- Message -----  From: Carlos Alexandra  Sent: 2/24/2022   2:56 PM EST  To: Devan Maxwell MD    FYI: pharmacy states prescriptions have been cancelled, they believe the pt called and cancelled them.  ----- Message -----  From: Devan Maxwell MD  Sent: 2/24/2022   2:42 PM EST  To: Carlos Alexandra    15 units TID  ----- Message -----  From: Gonzalez Primtrudy  Sent: 2/24/2022   1:57 PM EST  To: Deavn Maxwell, [address] [place] [address] Av called regarding Novolog, there are 2 sets of directions:     Inject 15 Units into the skin 3 times daily (before meals) INJECT 12 U BEFORE MEALS AS DIRECTED. Please advise.     Avison Young

## 2022-02-24 NOTE — CARE COORDINATION
DISCHARGE ORDER  Date/Time 2022 1:48 PM  Completed by: Jasmine Cline RN, Case Management    Patient Name: Rashaun Genao      : 1985  Admitting Diagnosis: Lactic acid acidosis [E87.2]  Diabetic ketoacidosis without coma associated with type 1 diabetes mellitus ([facility] [address] [E10.10]      Admit order Date and Status:INPT 22  (verify MD's last order for status of admission)      Noted discharge order. If applicable PT/OT recommendation at Discharge: n/a  DME recommendation by PT/OT:n/a  Confirmed discharge plan : Yes  with whom__with pt_____________  If pt confirmed DC plan does family need to be contacted by CM No if yes who______  Discharge Plan: Pt cont to plan to go home at discharge. Noted dietician provided teaching and resources for diabetic diet. Noted med changes from sliding scale to set dose. Pt nurse to provide reinforcement teaching and pt to follow up with pcp at discharge. No further needs identified. Reviewed chart. Role of discharge planner explained and patient verbalized understanding. Discharge order is noted. Has Home O2 in place on admit:  No  Informed of need to bring portable home O2 tank on day of discharge for nursing to connect prior to leaving:   Not Indicated  Verbalized agreement/Understanding:   Not Indicated  Pt is being d/c'd to home today. Pt's O2 sats are 99% on roomair. Discharge timeout done with [facility]. All discharge needs and concerns addressed.

## 2022-02-24 NOTE — PLAN OF CARE
Problem: Falls - Risk of:  Goal: Will remain free from falls  Description: Will remain free from falls  2/24/2022 0950 by Kalina Chacon RN  Outcome: Ongoing     Problem: Falls - Risk of:  Goal: Absence of physical injury  Description: Absence of physical injury  2/24/2022 0950 by Kalina Chacon RN  Outcome: Ongoing     Problem: Infection:  Goal: Will remain free from infection  Description: Will remain free from infection  2/24/2022 0950 by Kalina Chacon RN  Outcome: Ongoing     Problem: Safety:  Goal: Free from accidental physical injury  Description: Free from accidental physical injury  2/24/2022 0950 by Kalina Chacon RN  Outcome: Ongoing     Problem: Safety:  Goal: Free from intentional harm  Description: Free from intentional harm  Outcome: Ongoing     Problem: Daily Care:  Goal: Daily care needs are met  Description: Daily care needs are met  2/24/2022 0950 by Kalina Chacon RN  Outcome: Ongoing     Problem: Pain:  Goal: Patient's pain/discomfort is manageable  Description: Patient's pain/discomfort is manageable  Outcome: Ongoing     Problem: Skin Integrity:  Goal: Skin integrity will stabilize  Description: Skin integrity will stabilize  Outcome: Ongoing     Problem: Discharge Planning:  Goal: Patients continuum of care needs are met  Description: Patients continuum of care needs are met  Outcome: Ongoing

## 2022-02-24 NOTE — PLAN OF CARE
Problem: Falls - Risk of:  Goal: Will remain free from falls  Description: Will remain free from falls  Outcome: Ongoing  Goal: Absence of physical injury  Description: Absence of physical injury  Outcome: Ongoing     Problem: Infection:  Goal: Will remain free from infection  Description: Will remain free from infection  Outcome: Ongoing     Problem: Safety:  Goal: Free from accidental physical injury  Description: Free from accidental physical injury  Outcome: Ongoing     Problem: Daily Care:  Goal: Daily care needs are met  Description: Daily care needs are met  Outcome: Ongoing

## 2022-02-24 NOTE — DISCHARGE SUMMARY
Name:  Suellen Gallardo  Room:  1456/0491-73  MRN:    [id number]    Discharge Summary      This discharge summary is in conjunction with a complete physical exam done on the day of discharge. Attending Physician: Dr. Jaime Monroe  Discharging Physician: Dr. Prado Valles: 2/21/2022  Discharge:   2/24/2022    HPI:    43 y.o. male who presented to [facility] with past medical history of hypertension, hyperlipidemia, type 2 diabetes insulin-dependent, celiac disease presented to the ED with chief complaint of elevated blood sugar, nausea and vomiting and diarrhea. Patient reported for the past 5 days has been having some nausea vomiting and diarrhea. Patient reported having 3 bowel movements minimum per day that is watery nonbloody. Patient reported no known alleviating or exacerbating factors. Patient denied undercooked food, sick contacts, NSAIDs intake. Patient reported due to the nausea vomiting diarrhea he has not been taking his insulin or has been taking it intermittently depending on the blood sugar. Due to patient continued worsening and elevated blood sugar came to the ER for further evaluation. No associate with fever chills dysuria. Patient does report having chest pain that is pleuritic and with breathing in addition has been having epigastric abdominal pain    Diagnoses this Admission and Hospital Course   DKA:  S/p insulin gtt. Acidosis resolved. Corrected electrolytes  S.p IVF  Transitioned to SQ insulin and tolerating well  N/v resolved. Tolerating diet  Adjust insulin as needed to keep sugars below 160  Diabetic education provided and need more compliance    see dc meds        DMI -   His A1c was 11.2 in Sept.   Uncontrolled, see above      Acute diarrhea:  <7-day onset  C. Difficile pending - no BM since admission and CT abd pelvis reassuring. Covid neg        Acute renal failure:   Suspected prerenal from dehydration  Improved with IVF.         Sepsis without clear cause - possibly viral process vs symptoms due to DKA. Chest x-ray negative, UA unrevealing  Blood cultures pending, lactic acidosis - resolved  Pro-Audie elevated  Ceftriaxone and flagyl empiric -stopped due to no evidence of infection  Sepsis ruled out favor DKA symptoms         Hiccups: likely with severe emesis. Resolved with supportive care. Continues to complain of nausea. Recommended to follow up OP with GI if this does not improve.      Acute cough: covid test neg. cxr neg. Likely viral process. 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 Resumed recommend to see Dr. Kelli Cam again due to worsening IP joints in hands      Essential Hypertension: Continued home medication  Hyperlipidemia: Continued home medication     Schizoaffective disorder - resu    Procedures (Please Review Full Report for Details)  N/A    Consults    Pulmonology       Physical Exam at Discharge:    BP (!) 143/94   Pulse 99   Temp 97.9 °F (36.6 °C) (Oral)   Resp 16   Ht 5' 9\" (1.753 m)   Wt 194 lb 12.8 oz (88.4 kg)   SpO2 99%   BMI 28.77 kg/m²     General: young male,  Awake, alert and oriented. Appears to be not in any distress  Mucous Membranes:  Pink , anicteric  Neck: No JVD, no carotid bruit, no thyromegaly  Chest:  Clear to auscultation bilaterally, no added sounds  Cardiovascular:  RRR S1S2 heard, no murmurs or gallops  Abdomen:  Soft, undistended, non tender, no organomegaly, BS present  Extremities: diffuse IP joint arthritis of both hands  No edema or cyanosis.  Distal pulses well felt  Neurological : grossly normal    CBC: Recent Labs     02/21/22  1339 02/22/22  0357 02/23/22  0454   WBC 24.2* 12.7* 6.0   HGB 15.4 12.9* 11.9*   HCT 51.9 38.4* 36.4*   MCV 93.4 84.2 84.3   * 299 229     BMP:   Recent Labs     02/21/22  1724 02/22/22  1130 02/23/22  0454 02/23/22  1605 02/24/22  0412   NA   < > 132* 135* 136 137   K   < > 3.6 3.4* 3.6 3.4*   CL   < > 102 104 101 101   CO2   < > 18* 21 23 24   PHOS  --  1.8* --  2.1* 2.9   BUN   < > 11 8 11 11   CREATININE   < > 0.9 0.7* 0.7* 0.7*    < > = values in this interval not displayed. LIVER PROFILE:   Recent Labs     02/21/22  1339 02/22/22  0357   AST 9* 10*   ALT 15 10   LIPASE 58.0  --    BILIDIR  --  <0.2   BILITOT 0.3 0.3   ALKPHOS 173* 113     UA:  Recent Labs     02/21/22  1330   COLORU Yellow   PHUR 5.5   WBCUA 0-2   RBCUA 3-4   BACTERIA 1+*   CLARITYU Clear   SPECGRAV 1.025   LEUKOCYTESUR Negative   UROBILINOGEN 0.2   BILIRUBINUR SMALL*   BLOODU SMALL*   GLUCOSEU >=1000*   AMORPHOUS 1+       CARDIAC ENZYMES  Recent Labs     02/21/22  1339   TROPONINI <0.01         CULTURES  Blood: NGTD  C-diff: canceled; no further diarrhea  COVID/influenza: not detected    RADIOLOGY  XR ABDOMEN (KUB) (SINGLE AP VIEW)   Final Result   Nonobstructive bowel gas pattern with no significant stool. CT ABDOMEN PELVIS WO CONTRAST Additional Contrast? None   Final Result   1. Minimal possibly liquid stool that can be seen with diarrhea. However, no   associated findings of enterocolitis are present. 2. Hepatic steatosis. XR CHEST PORTABLE   Final Result   No acute cardiopulmonary process. Discharge Medications     Medication List      START taking these medications    benzonatate 200 MG capsule  Commonly known as: TESSALON  Take 1 capsule by mouth 3 times daily for 7 days     lisinopril 30 MG tablet  Commonly known as: PRINIVIL;ZESTRIL  Take 1 tablet by mouth daily  Start taking on: February 25, 2022  Notes to patient: Lisinopril (Zestril, Prinivil)  Use: Lower blood pressure, prevent heart failure  Side effects: Cough, Headache, diarrhea and dizziness, call 911 right away if lips or throat begin to swell     ondansetron 4 MG disintegrating tablet  Commonly known as: Zofran ODT  Take 1 tablet by mouth every 8 hours as needed for Nausea or Vomiting  Notes to patient: Ondansetron (Zofran®)  Use: nausea and vomiting.     Side effects: headache, weakness or dizziness. CHANGE how you take these medications    Levemir FlexTouch 100 UNIT/ML injection pen  Generic drug: insulin detemir  Take 30 units in the morning; 20 units nightly  What changed:   · how much to take  · how to take this  · when to take this  · additional instructions     NovoLOG FlexPen 100 UNIT/ML injection pen  Generic drug: insulin aspart  Inject 15 Units into the skin 3 times daily (before meals) INJECT 12 U BEFORE MEALS AS DIRECTED. What changed:   · how much to take  · how to take this  · when to take this        CONTINUE taking these medications    Crestor 10 MG tablet  Generic drug: rosuvastatin     hydroxychloroquine 200 MG tablet  Commonly known as: PLAQUENIL     pantoprazole sodium 40 MG Pack packet  Commonly known as: PROTONIX     pregabalin 150 MG capsule  Commonly known as: LYRICA     topiramate 50 MG tablet  Commonly known as: TOPAMAX     Vraylar 6 MG Caps capsule  Generic drug: Cariprazine HCl     * Wellbutrin  MG extended release tablet  Generic drug: buPROPion     * buPROPion 150 MG extended release tablet  Commonly known as: WELLBUTRIN XL         * This list has 2 medication(s) that are the same as other medications prescribed for you. Read the directions carefully, and ask your doctor or other care provider to review them with you. Where to Get Your Medications      These medications were sent to [address], [address]    Phone: [phone]   · benzonatate 200 MG capsule  · Levemir FlexTouch 100 UNIT/ML injection pen  · lisinopril 30 MG tablet  · NovoLOG FlexPen 100 UNIT/ML injection pen  · ondansetron 4 MG disintegrating tablet           Discharged in stable condition to home. Follow Up: Follow up with PCP.     Daniel Mendoza MD, 2/24/2022 2:34 PM

## 2022-02-25 LAB
BLOOD CULTURE, ROUTINE: NORMAL
CULTURE, BLOOD 2: NORMAL

## 2022-04-01 RX ORDER — INSULIN DETEMIR 100 [IU]/ML
INJECTION, SOLUTION SUBCUTANEOUS
OUTPATIENT
Start: 2022-04-01

## 2022-04-01 RX ORDER — INSULIN ASPART 100 [IU]/ML
INJECTION, SOLUTION INTRAVENOUS; SUBCUTANEOUS
OUTPATIENT
Start: 2022-04-01

## 2022-04-01 RX ORDER — PEN NEEDLE, DIABETIC 32GX 5/32"
NEEDLE, DISPOSABLE MISCELLANEOUS
OUTPATIENT
Start: 2022-04-01

## 2022-04-08 RX ORDER — PEN NEEDLE, DIABETIC 32GX 5/32"
NEEDLE, DISPOSABLE MISCELLANEOUS
OUTPATIENT
Start: 2022-04-08

## 2023-02-05 ENCOUNTER — HOSPITAL ENCOUNTER (EMERGENCY)
Age: 38
Discharge: HOME | End: 2023-02-06
Attending: STUDENT IN AN ORGANIZED HEALTH CARE EDUCATION/TRAINING PROGRAM
Payer: MEDICARE

## 2023-02-05 DIAGNOSIS — R73.9 HYPERGLYCEMIA: Primary | ICD-10-CM

## 2023-02-05 LAB
A/G RATIO: 1.6 (ref 1.1–2.2)
ALBUMIN SERPL-MCNC: 4.7 G/DL (ref 3.4–5)
ALP BLD-CCNC: 144 U/L (ref 40–129)
ALT SERPL-CCNC: 19 U/L (ref 10–40)
ANION GAP SERPL CALCULATED.3IONS-SCNC: 16 MMOL/L (ref 3–16)
ANION GAP SERPL CALCULATED.3IONS-SCNC: 20 MMOL/L (ref 3–16)
AST SERPL-CCNC: 11 U/L (ref 15–37)
BASE EXCESS VENOUS: -6.6 MMOL/L (ref -3–3)
BASOPHILS ABSOLUTE: 0.1 K/UL (ref 0–0.2)
BASOPHILS RELATIVE PERCENT: 0.8 %
BETA-HYDROXYBUTYRATE: 4.1 MMOL/L (ref 0–0.27)
BILIRUB SERPL-MCNC: 0.4 MG/DL (ref 0–1)
BILIRUBIN URINE: NEGATIVE
BLOOD, URINE: NEGATIVE
BUN BLDV-MCNC: 30 MG/DL (ref 7–20)
BUN BLDV-MCNC: 32 MG/DL (ref 7–20)
CALCIUM SERPL-MCNC: 8.6 MG/DL (ref 8.3–10.6)
CALCIUM SERPL-MCNC: 8.9 MG/DL (ref 8.3–10.6)
CARBOXYHEMOGLOBIN: 3.7 % (ref 0–1.5)
CHLORIDE BLD-SCNC: 101 MMOL/L (ref 99–110)
CHLORIDE BLD-SCNC: 96 MMOL/L (ref 99–110)
CLARITY: CLEAR
CO2: 17 MMOL/L (ref 21–32)
CO2: 18 MMOL/L (ref 21–32)
COLOR: YELLOW
CREAT SERPL-MCNC: 0.9 MG/DL (ref 0.9–1.3)
CREAT SERPL-MCNC: 1.1 MG/DL (ref 0.9–1.3)
EOSINOPHILS ABSOLUTE: 0.1 K/UL (ref 0–0.6)
EOSINOPHILS RELATIVE PERCENT: 1 %
GFR SERPL CREATININE-BSD FRML MDRD: >60 ML/MIN/{1.73_M2}
GFR SERPL CREATININE-BSD FRML MDRD: >60 ML/MIN/{1.73_M2}
GLUCOSE BLD-MCNC: 335 MG/DL (ref 70–99)
GLUCOSE BLD-MCNC: 399 MG/DL (ref 70–99)
GLUCOSE BLD-MCNC: 532 MG/DL (ref 70–99)
GLUCOSE BLD-MCNC: 535 MG/DL (ref 70–99)
GLUCOSE URINE: >=1000 MG/DL
HCO3 VENOUS: 17.5 MMOL/L (ref 23–29)
HCT VFR BLD CALC: 40.9 % (ref 40.5–52.5)
HEMOGLOBIN: 13.8 G/DL (ref 13.5–17.5)
INFLUENZA A: NOT DETECTED
INFLUENZA B: NOT DETECTED
KETONES, URINE: 40 MG/DL
LEUKOCYTE ESTERASE, URINE: NEGATIVE
LYMPHOCYTES ABSOLUTE: 2.2 K/UL (ref 1–5.1)
LYMPHOCYTES RELATIVE PERCENT: 32.4 %
MCH RBC QN AUTO: 31.8 PG (ref 26–34)
MCHC RBC AUTO-ENTMCNC: 33.6 G/DL (ref 31–36)
MCV RBC AUTO: 94.6 FL (ref 80–100)
METHEMOGLOBIN VENOUS: 0.2 %
MICROSCOPIC EXAMINATION: ABNORMAL
MONOCYTES ABSOLUTE: 0.6 K/UL (ref 0–1.3)
MONOCYTES RELATIVE PERCENT: 9.5 %
NEUTROPHILS ABSOLUTE: 3.8 K/UL (ref 1.7–7.7)
NEUTROPHILS RELATIVE PERCENT: 56.3 %
NITRITE, URINE: NEGATIVE
O2 CONTENT, VEN: 20 VOL %
O2 SAT, VEN: 99 %
O2 THERAPY: ABNORMAL
PCO2, VEN: 31.1 MMHG (ref 40–50)
PDW BLD-RTO: 14.9 % (ref 12.4–15.4)
PERFORMED ON: ABNORMAL
PERFORMED ON: ABNORMAL
PH UA: 5.5 (ref 5–8)
PH VENOUS: 7.37 (ref 7.35–7.45)
PLATELET # BLD: 277 K/UL (ref 135–450)
PMV BLD AUTO: 8.4 FL (ref 5–10.5)
PO2, VEN: 131.4 MMHG (ref 25–40)
POTASSIUM REFLEX MAGNESIUM: 4.4 MMOL/L (ref 3.5–5.1)
POTASSIUM REFLEX MAGNESIUM: 4.5 MMOL/L (ref 3.5–5.1)
PROTEIN UA: NEGATIVE MG/DL
RBC # BLD: 4.32 M/UL (ref 4.2–5.9)
SARS-COV-2 RNA, RT PCR: NOT DETECTED
SODIUM BLD-SCNC: 134 MMOL/L (ref 136–145)
SODIUM BLD-SCNC: 134 MMOL/L (ref 136–145)
SPECIFIC GRAVITY UA: 1.01 (ref 1–1.03)
TCO2 CALC VENOUS: 18 MMOL/L
TOTAL PROTEIN: 7.6 G/DL (ref 6.4–8.2)
URINE REFLEX TO CULTURE: ABNORMAL
URINE TYPE: ABNORMAL
UROBILINOGEN, URINE: 0.2 E.U./DL
WBC # BLD: 6.7 K/UL (ref 4–11)

## 2023-02-05 PROCEDURE — 96375 TX/PRO/DX INJ NEW DRUG ADDON: CPT

## 2023-02-05 PROCEDURE — 36415 COLL VENOUS BLD VENIPUNCTURE: CPT

## 2023-02-05 PROCEDURE — 81003 URINALYSIS AUTO W/O SCOPE: CPT

## 2023-02-05 PROCEDURE — 87636 SARSCOV2 & INF A&B AMP PRB: CPT

## 2023-02-05 PROCEDURE — 82803 BLOOD GASES ANY COMBINATION: CPT

## 2023-02-05 PROCEDURE — 2580000003 HC RX 258: Performed by: PHYSICIAN ASSISTANT

## 2023-02-05 PROCEDURE — 82010 KETONE BODYS QUAN: CPT

## 2023-02-05 PROCEDURE — 96374 THER/PROPH/DIAG INJ IV PUSH: CPT

## 2023-02-05 PROCEDURE — 99284 EMERGENCY DEPT VISIT MOD MDM: CPT

## 2023-02-05 PROCEDURE — 85025 COMPLETE CBC W/AUTO DIFF WBC: CPT

## 2023-02-05 PROCEDURE — 6360000002 HC RX W HCPCS: Performed by: PHYSICIAN ASSISTANT

## 2023-02-05 PROCEDURE — 80053 COMPREHEN METABOLIC PANEL: CPT

## 2023-02-05 PROCEDURE — 6370000000 HC RX 637 (ALT 250 FOR IP): Performed by: PHYSICIAN ASSISTANT

## 2023-02-05 RX ORDER — INSULIN ASPART 100 [IU]/ML
INJECTION, SOLUTION INTRAVENOUS; SUBCUTANEOUS
COMMUNITY
Start: 2023-02-02

## 2023-02-05 RX ORDER — MORPHINE SULFATE 4 MG/ML
4 INJECTION, SOLUTION INTRAMUSCULAR; INTRAVENOUS ONCE
Status: COMPLETED | OUTPATIENT
Start: 2023-02-05 | End: 2023-02-05

## 2023-02-05 RX ORDER — 0.9 % SODIUM CHLORIDE 0.9 %
1000 INTRAVENOUS SOLUTION INTRAVENOUS ONCE
Status: COMPLETED | OUTPATIENT
Start: 2023-02-05 | End: 2023-02-06

## 2023-02-05 RX ADMIN — SODIUM CHLORIDE 1000 ML: 9 INJECTION, SOLUTION INTRAVENOUS at 21:42

## 2023-02-05 RX ADMIN — INSULIN HUMAN 10 UNITS: 100 INJECTION, SOLUTION PARENTERAL at 21:45

## 2023-02-05 RX ADMIN — MORPHINE SULFATE 4 MG: 4 INJECTION, SOLUTION INTRAMUSCULAR; INTRAVENOUS at 23:05

## 2023-02-05 ASSESSMENT — PAIN DESCRIPTION - ORIENTATION: ORIENTATION: RIGHT

## 2023-02-05 ASSESSMENT — PAIN DESCRIPTION - LOCATION: LOCATION: SHOULDER

## 2023-02-05 ASSESSMENT — PAIN DESCRIPTION - DESCRIPTORS: DESCRIPTORS: TEARING

## 2023-02-05 ASSESSMENT — PAIN - FUNCTIONAL ASSESSMENT
PAIN_FUNCTIONAL_ASSESSMENT: ACTIVITIES ARE NOT PREVENTED
PAIN_FUNCTIONAL_ASSESSMENT: NONE - DENIES PAIN

## 2023-02-05 ASSESSMENT — PAIN SCALES - GENERAL: PAINLEVEL_OUTOF10: 9

## 2023-02-06 VITALS
BODY MASS INDEX: 31.1 KG/M2 | OXYGEN SATURATION: 95 % | HEIGHT: 69 IN | WEIGHT: 210 LBS | TEMPERATURE: 98.5 F | DIASTOLIC BLOOD PRESSURE: 80 MMHG | HEART RATE: 91 BPM | SYSTOLIC BLOOD PRESSURE: 129 MMHG | RESPIRATION RATE: 16 BRPM

## 2023-02-06 LAB
GLUCOSE BLD-MCNC: 344 MG/DL (ref 70–99)
GLUCOSE BLD-MCNC: 376 MG/DL (ref 70–99)
PERFORMED ON: ABNORMAL
PERFORMED ON: ABNORMAL

## 2023-02-06 PROCEDURE — 2580000003 HC RX 258: Performed by: PHYSICIAN ASSISTANT

## 2023-02-06 PROCEDURE — 6370000000 HC RX 637 (ALT 250 FOR IP): Performed by: STUDENT IN AN ORGANIZED HEALTH CARE EDUCATION/TRAINING PROGRAM

## 2023-02-06 RX ORDER — OXYCODONE HYDROCHLORIDE 5 MG/1
5 TABLET ORAL ONCE
Status: COMPLETED | OUTPATIENT
Start: 2023-02-06 | End: 2023-02-06

## 2023-02-06 RX ORDER — SODIUM CHLORIDE 9 MG/ML
1000 INJECTION, SOLUTION INTRAVENOUS CONTINUOUS
Status: DISCONTINUED | OUTPATIENT
Start: 2023-02-06 | End: 2023-02-06 | Stop reason: HOSPADM

## 2023-02-06 RX ADMIN — SODIUM CHLORIDE 1000 ML: 9 INJECTION, SOLUTION INTRAVENOUS at 00:38

## 2023-02-06 RX ADMIN — OXYCODONE HYDROCHLORIDE 5 MG: 5 TABLET ORAL at 04:48

## 2023-02-06 ASSESSMENT — PAIN DESCRIPTION - LOCATION: LOCATION: ARM

## 2023-02-06 ASSESSMENT — PAIN SCALES - GENERAL: PAINLEVEL_OUTOF10: 9

## 2023-02-06 ASSESSMENT — PAIN DESCRIPTION - ORIENTATION: ORIENTATION: RIGHT

## 2023-02-06 ASSESSMENT — PAIN - FUNCTIONAL ASSESSMENT
PAIN_FUNCTIONAL_ASSESSMENT: NONE - DENIES PAIN
PAIN_FUNCTIONAL_ASSESSMENT: ACTIVITIES ARE NOT PREVENTED

## 2023-02-06 ASSESSMENT — PAIN DESCRIPTION - DESCRIPTORS: DESCRIPTORS: ACHING

## 2023-02-06 NOTE — ED PROVIDER NOTES
[address] ED  EMERGENCY DEPARTMENT ENCOUNTER        Pt Name: Kathia Jaime MRN: [id number]  Birthdate 1985  Date of evaluation: 2/5/2023  Provider: VIOLET Cowan  PCP: Marin Litten, MD  Note Started: 8:10 PM EST 2/5/23       I have seen and evaluated this patient with my supervising physician Yenny Mon MD.      [address] [place] [address]       Chief Complaint   Patient presents with    Hyperglycemia     Pt type 1 diabetic and has been out of insulin for a day. EMS state pts BG was 572. Pt denies vomiting. HISTORY OF PRESENT ILLNESS: 1 or more Elements     History from : Patient    Limitations to history : None    Kathia Jaime is a 42 y.o. male past medical history of rheumatoid arthritis, type I diabetes, hypertension, hyperlipidemia, schizoaffective disorder, history of DKA, history of NOVA, thrombocytopenia, fibromyalgia, juvenile rheumatoid arthritis who presents for evaluation of concern for hyperglycemia with concern for DKA. Nursing Notes were all reviewed and agreed with or any disagreements were addressed in the HPI. REVIEW OF SYSTEMS :      Review of Systems    Positives and Pertinent negatives as per HPI. SURGICAL HISTORY     Past Surgical History:   Procedure Laterality Date    HIP SURGERY      bilateral    JOINT REPLACEMENT Bilateral     THR    JOINT REPLACEMENT Left 6/3/2016    Revision left lateral total hip replacement    KNEE ARTHROSCOPY Right     KNEE ARTHROSCOPY Left 9/25/2013    medial & lateral Meniscectomy    LIVER BIOPSY      MUSCLE BIOPSY      TYMPANOSTOMY TUBE PLACEMENT      WRIST SURGERY Right     CYST       CURRENTMEDICATIONS       Previous Medications    BUPROPION (WELLBUTRIN XL) 150 MG EXTENDED RELEASE TABLET    Take 150 mg by mouth every morning    BUPROPION (WELLBUTRIN XL) 300 MG EXTENDED RELEASE TABLET    Take 300 mg by mouth every morning    HYDROXYCHLOROQUINE (PLAQUENIL) 200 MG TABLET    Take 200 mg by mouth 2 times daily. INSULIN ASPART (NOVOLOG FLEXPEN) 100 UNIT/ML INJECTION PEN    Inject 15 Units into the skin 3 times daily (before meals)    INSULIN DETEMIR (LEVEMIR FLEXTOUCH) 100 UNIT/ML INJECTION PEN    Take 30 units in the morning; 20 units nightly    LISINOPRIL (PRINIVIL;ZESTRIL) 30 MG TABLET    Take 1 tablet by mouth daily    NOVOLOG 100 UNIT/ML INJECTION VIAL        ONDANSETRON (ZOFRAN ODT) 4 MG DISINTEGRATING TABLET    Take 1 tablet by mouth every 8 hours as needed for Nausea or Vomiting    PANTOPRAZOLE SODIUM (PROTONIX) 40 MG PACK PACKET    Take 40 mg by mouth daily. PREGABALIN (LYRICA) 150 MG CAPSULE    Take 300 mg by mouth 2 times daily. ROSUVASTATIN (CRESTOR) 10 MG TABLET    Take 10 mg by mouth daily    TOPIRAMATE (TOPAMAX) 50 MG TABLET    TAKE 1 TABLET BY MOUTH EVERYDAY AT BEDTIME    VRAYLAR 6 MG CAPS CAPSULE           ALLERGIES     Aspirin    FAMILYHISTORY       Family History   Problem Relation Age of Onset    High Blood Pressure Mother     Diabetes Father     Heart Disease Maternal Grandmother         SOCIAL HISTORY       Social History     Tobacco Use    Smoking status: Never    Smokeless tobacco: Never   Vaping Use    Vaping Use: Never used   Substance Use Topics    Alcohol use: Yes     Comment: occasionally    Drug use: No       SCREENINGS        Susie Coma Scale  Eye Opening: Spontaneous  Best Verbal Response: Oriented  Best Motor Response: Obeys commands  [place] Coma Scale Score: 15                CIWA Assessment  BP: 119/74  Heart Rate: 97           PHYSICAL EXAM  1 or more Elements     ED Triage Vitals   BP Temp Temp src Pulse Resp SpO2 Height Weight   -- -- -- -- -- -- -- --       Physical Exam  Vitals and nursing note reviewed. Constitutional:       General: He is not in acute distress. Appearance: He is well-developed. He is not ill-appearing, toxic-appearing or diaphoretic. HENT:      Head: Normocephalic and atraumatic.       Right Ear: External ear normal.      Left Ear: External ear normal.      Nose: Nose normal.      Mouth/Throat:      Mouth: Mucous membranes are dry. Pharynx: Oropharynx is clear. Eyes:      General:         Right eye: No discharge. Left eye: No discharge. Extraocular Movements: Extraocular movements intact. Conjunctiva/sclera: Conjunctivae normal.      Pupils: Pupils are equal, round, and reactive to light. Cardiovascular:      Rate and Rhythm: Regular rhythm. Tachycardia present. Pulses: Normal pulses. Heart sounds: Normal heart sounds. No murmur heard. No friction rub. No gallop. Pulmonary:      Effort: Pulmonary effort is normal. No respiratory distress. Breath sounds: Normal breath sounds. No wheezing or rales. Abdominal:      General: There is no distension. Palpations: Abdomen is soft. Tenderness: There is no abdominal tenderness. There is no guarding. Musculoskeletal:      Cervical back: Normal range of motion and neck supple. No rigidity. Skin:     General: Skin is warm and dry. Capillary Refill: Capillary refill takes less than 2 seconds. Neurological:      General: No focal deficit present. Mental Status: He is alert and oriented to person, place, and time.    Psychiatric:         Behavior: Behavior normal.           DIAGNOSTIC RESULTS   LABS:    Labs Reviewed   COMPREHENSIVE METABOLIC PANEL W/ REFLEX TO MG FOR LOW K - Abnormal; Notable for the following components:       Result Value    Sodium 134 (*)     Chloride 96 (*)     CO2 18 (*)     Anion Gap 20 (*)     Glucose 535 (*)     BUN 32 (*)     Alkaline Phosphatase 144 (*)     AST 11 (*)     All other components within normal limits   BLOOD GAS, VENOUS - Abnormal; Notable for the following components:    pCO2, [place] 31.1 (*)     pO2, [place] 131.4 (*)     HCO3, Venous 17.5 (*)     Base Excess, [place] -6.6 (*)     Carboxyhemoglobin 3.7 (*)     All other components within normal limits   BETA-HYDROXYBUTYRATE - Abnormal; Notable for the following components:    Beta-Hydroxybutyrate 4.10 (*)     All other components within normal limits   URINALYSIS WITH REFLEX TO CULTURE - Abnormal; Notable for the following components:    Glucose, Ur >=1000 (*)     Ketones, Urine 40 (*)     All other components within normal limits   BASIC METABOLIC PANEL W/ REFLEX TO MG FOR LOW K - Abnormal; Notable for the following components:    Sodium 134 (*)     CO2 17 (*)     Glucose 399 (*)     BUN 30 (*)     All other components within normal limits   POCT GLUCOSE - Abnormal; Notable for the following components:    POC Glucose 532 (*)     All other components within normal limits   POCT GLUCOSE - Abnormal; Notable for the following components:    POC Glucose 335 (*)     All other components within normal limits   COVID-19 & INFLUENZA COMBO   CBC WITH AUTO DIFFERENTIAL   POCT GLUCOSE   POCT GLUCOSE   POCT GLUCOSE   POCT GLUCOSE       When ordered only abnormal lab results are displayed. All other labs were within normal range or not returned as of this dictation. EKG: When ordered, EKG's are interpreted by the Emergency Department Physician in the absence of a cardiologist.  Please see their note for interpretation of EKG. RADIOLOGY:   Non-plain film images such as CT, Ultrasound and MRI are read by the radiologist. Plain radiographic images are visualized and preliminarily interpreted by the ED Provider with the below findings:        Interpretation per the Radiologist below, if available at the time of this note:    No orders to display     No results found. No results found. PROCEDURES   Unless otherwise noted below, none     Procedures    CRITICAL CARE TIME (.cctime)   I personally saw the patient and independently provided 15 minutes of non-concurrent critical care out of the total shared critical care time provided. This excludes time spent doing separately billable procedures.   This includes time at the bedside, data interpretation, medication management, obtaining critical history from collateral sources if the patient is unable to provide it directly, and physician consultation. Specifics of interventions taken and potentially life-threatening diagnostic considerations are listed above in the medical decision making. If this was a shared visit with a Physician the time in this attestation is non-concurrent critical care time out of the total shared critical care time provided by the Physician and myself. PAST MEDICAL HISTORY      has a past medical history of Anesthesia complication, Celiac disease, Dermatomyositis ([facility] Diabetes mellitus ([facility] Fibromyalgia, Hyperlipidemia, Hypertension, Juvenile rheumatoid arthritis ([facility] [address] Movement disorder, Rheumatoid arthritis(714.0), Vertebral basilar insufficiency, and Wears glasses. Chronic Conditions affecting Care: above    EMERGENCY DEPARTMENT COURSE and DIFFERENTIAL DIAGNOSIS/MDM:   Vitals:    Vitals:    02/05/23 2145 02/05/23 2200 02/05/23 2244 02/06/23 0015   BP: 129/83 (!) 147/79 130/82 119/74   Pulse:   (!) 103 97   Resp:   16 18   Temp:   98.9 °F (37.2 °C)    TempSrc:   Oral    SpO2: 95% 96% 95% 96%   Weight:       Height:           Patient was given the following medications:  Medications   0.9 % sodium chloride infusion (1,000 mLs IntraVENous New Bag 2/6/23 0038)   0.9 % sodium chloride bolus (0 mLs IntraVENous Stopped 2/5/23 2306)   insulin regular (HUMULIN R;NOVOLIN R) injection 10 Units (10 Units IntraVENous Given 2/5/23 2145)   morphine sulfate (PF) injection 4 mg (4 mg IntraVENous Given 2/5/23 2305)       ED Course as of 02/06/23 0055   Sun Feb 05, 2023 2040 Ran out insulin last night    [CS]      ED Course User Index  [CS] VIOLET Jones        Is this patient to be included in the SEP-1 Core Measure due to severe sepsis or septic shock? No   Exclusion criteria - the patient is NOT to be included for SEP-1 Core Measure due to:   Infection is not suspected    CONSULTS: (Who and What was discussed)  None              CC/HPI Summary, DDx, ED Course, and Reassessment: Patient seen and evaluated. Old records reviewed. Diagnostic testing reviewed and results discussed. Patient is a pleasant 73-year-old male who presents for evaluation of hyperglycemia. Patient seen and evaluated by myself history obtained from patient. Patient notes that he is a insulin-dependent type 2 diabetic, he is on an insulin pump but ran out of his insulin medication. He ran out last night. He notes that today he has had emesis x3 and some generalized nausea. He denies any diarrhea or abdominal pain. He notes chronic shoulder pain and is requesting pain medicine. Patient is mildly tachycardic on evaluation, he is otherwise hemodynamically stable. He has dry mucous membranes  exam otherwise unremarkable. CBC is normal.  Metabolic panel reveals hyperglycemia with anion gap. VBG is negative for sign of acute acidosis however patient is with ketonuria and serum ketosis. Patient started on IV fluids and insulin. After IV fluids administered repeat metabolic panel obtained, he no longer has a gap, he remains hyperglycemic. Patient n.p.o. in the department. At this time patient would be a reasonable candidate for discharge home however he does not have any of his diabetes medications he is unable to get them from the pharmacy until tomorrow morning therefore he will remain in observation in the department on maintenance fluids and he will remain n.p.o. with serial glucose checks. Disposition Considerations (include 1 Tests not done, Shared Decision Making, Pt Expectation of Test or Tx.):   12:55 AM: I discussed the history, physical, and treatment plan with Dr. Halie William. was signed out in stable condition. Please see Dr. Judy England note for further details, including diagnosis and disposition. I am the Primary Clinician of Record. FINAL IMPRESSION      1.  Hyperglycemia DISPOSITION/PLAN     DISPOSITION Ed Observation 02/06/2023 12:55:44 AM      PATIENT REFERRED TO:  No follow-up provider specified.     DISCHARGE MEDICATIONS:  New Prescriptions    No medications on file       DISCONTINUED MEDICATIONS:  Discontinued Medications    No medications on file              (Please note that portions of this note were completed with a voice recognition program.  Efforts were made to edit the dictations but occasionally words are mis-transcribed.)    VIOLET Youssef (electronically signed)            Lucille Youssef  02/06/23 7833

## 2024-03-11 ENCOUNTER — HOSPITAL ENCOUNTER (INPATIENT)
Age: 39
LOS: 1 days | Discharge: HOME | DRG: 639 | End: 2024-03-12
Attending: EMERGENCY MEDICINE | Admitting: INTERNAL MEDICINE
Payer: MEDICARE

## 2024-03-11 DIAGNOSIS — E13.10 DIABETIC KETOACIDOSIS WITHOUT COMA ASSOCIATED WITH OTHER SPECIFIED DIABETES MELLITUS (HCC): Primary | ICD-10-CM

## 2024-03-11 LAB
ALBUMIN SERPL-MCNC: 4.2 G/DL (ref 3.4–5)
ALBUMIN/GLOB SERPL: 2 {RATIO} (ref 1.1–2.2)
ALP SERPL-CCNC: 131 U/L (ref 40–129)
ALT SERPL-CCNC: 24 U/L (ref 10–40)
ANION GAP SERPL CALCULATED.3IONS-SCNC: 12 MMOL/L (ref 3–16)
ANION GAP SERPL CALCULATED.3IONS-SCNC: 19 MMOL/L (ref 3–16)
ANION GAP SERPL CALCULATED.3IONS-SCNC: 20 MMOL/L (ref 3–16)
AST SERPL-CCNC: 16 U/L (ref 15–37)
BASE EXCESS BLDV CALC-SCNC: -7 MMOL/L (ref -3–3)
BASOPHILS # BLD: 0.1 K/UL (ref 0–0.2)
BASOPHILS NFR BLD: 0.8 %
BETA-HYDROXYBUTYRATE: 3.2 MMOL/L (ref 0–0.27)
BILIRUB SERPL-MCNC: 0.5 MG/DL (ref 0–1)
BILIRUB UR QL STRIP.AUTO: NEGATIVE
BUN SERPL-MCNC: 19 MG/DL (ref 7–20)
BUN SERPL-MCNC: 24 MG/DL (ref 7–20)
BUN SERPL-MCNC: 24 MG/DL (ref 7–20)
CALCIUM SERPL-MCNC: 8.8 MG/DL (ref 8.3–10.6)
CALCIUM SERPL-MCNC: 9 MG/DL (ref 8.3–10.6)
CALCIUM SERPL-MCNC: 9.3 MG/DL (ref 8.3–10.6)
CHLORIDE SERPL-SCNC: 100 MMOL/L (ref 99–110)
CHLORIDE SERPL-SCNC: 100 MMOL/L (ref 99–110)
CHLORIDE SERPL-SCNC: 108 MMOL/L (ref 99–110)
CLARITY UR: CLEAR
CO2 BLDV-SCNC: 18 MMOL/L
CO2 SERPL-SCNC: 17 MMOL/L (ref 21–32)
CO2 SERPL-SCNC: 18 MMOL/L (ref 21–32)
CO2 SERPL-SCNC: 20 MMOL/L (ref 21–32)
COHGB MFR BLDV: 1.4 % (ref 0–1.5)
COLOR UR: ABNORMAL
CREAT SERPL-MCNC: 0.8 MG/DL (ref 0.9–1.3)
CREAT SERPL-MCNC: 0.9 MG/DL (ref 0.9–1.3)
CREAT SERPL-MCNC: 0.9 MG/DL (ref 0.9–1.3)
DEPRECATED RDW RBC AUTO: 14.4 % (ref 12.4–15.4)
EOSINOPHIL # BLD: 0.1 K/UL (ref 0–0.6)
EOSINOPHIL NFR BLD: 0.9 %
FLUAV RNA RESP QL NAA+PROBE: NOT DETECTED
FLUBV RNA RESP QL NAA+PROBE: NOT DETECTED
GFR SERPLBLD CREATININE-BSD FMLA CKD-EPI: >60 ML/MIN/{1.73_M2}
GLUCOSE BLD-MCNC: 173 MG/DL (ref 70–99)
GLUCOSE BLD-MCNC: 202 MG/DL (ref 70–99)
GLUCOSE BLD-MCNC: 239 MG/DL (ref 70–99)
GLUCOSE BLD-MCNC: 264 MG/DL (ref 70–99)
GLUCOSE BLD-MCNC: 293 MG/DL (ref 70–99)
GLUCOSE BLD-MCNC: 406 MG/DL (ref 70–99)
GLUCOSE BLD-MCNC: 506 MG/DL (ref 70–99)
GLUCOSE SERPL-MCNC: 174 MG/DL (ref 70–99)
GLUCOSE SERPL-MCNC: 446 MG/DL (ref 70–99)
GLUCOSE SERPL-MCNC: 496 MG/DL (ref 70–99)
GLUCOSE UR STRIP.AUTO-MCNC: >=1000 MG/DL
HCO3 BLDV-SCNC: 17 MMOL/L (ref 23–29)
HCT VFR BLD AUTO: 43.6 % (ref 40.5–52.5)
HGB BLD-MCNC: 14.3 G/DL (ref 13.5–17.5)
HGB UR QL STRIP.AUTO: NEGATIVE
KETONES UR STRIP.AUTO-MCNC: 40 MG/DL
LACTATE BLDV-SCNC: 1.7 MMOL/L (ref 0.4–1.9)
LEUKOCYTE ESTERASE UR QL STRIP.AUTO: NEGATIVE
LYMPHOCYTES # BLD: 1.5 K/UL (ref 1–5.1)
LYMPHOCYTES NFR BLD: 19.4 %
MAGNESIUM SERPL-MCNC: 2 MG/DL (ref 1.8–2.4)
MCH RBC QN AUTO: 27.1 PG (ref 26–34)
MCHC RBC AUTO-ENTMCNC: 32.9 G/DL (ref 31–36)
MCV RBC AUTO: 82.4 FL (ref 80–100)
METHGB MFR BLDV: 0.3 %
MONOCYTES # BLD: 0.8 K/UL (ref 0–1.3)
MONOCYTES NFR BLD: 10.7 %
NEUTROPHILS # BLD: 5.3 K/UL (ref 1.7–7.7)
NEUTROPHILS NFR BLD: 68.2 %
NITRITE UR QL STRIP.AUTO: NEGATIVE
O2 CT VFR BLDV CALC: 20 VOL %
O2 THERAPY: ABNORMAL
OSMOLALITY SERPL: 322 MOSM/KG (ref 275–295)
OSMOLALITY UR: 638 MOSM/KG (ref 390–1070)
PCO2 BLDV: 30.3 MMHG (ref 40–50)
PERFORMED ON: ABNORMAL
PH BLDV: 7.37 [PH] (ref 7.35–7.45)
PH UR STRIP.AUTO: 6 [PH] (ref 5–8)
PHOSPHATE SERPL-MCNC: 3.5 MG/DL (ref 2.5–4.9)
PLATELET # BLD AUTO: 201 K/UL (ref 135–450)
PMV BLD AUTO: 9.6 FL (ref 5–10.5)
PO2 BLDV: 107.8 MMHG (ref 25–40)
POTASSIUM SERPL-SCNC: 4.1 MMOL/L (ref 3.5–5.1)
POTASSIUM SERPL-SCNC: 4.6 MMOL/L (ref 3.5–5.1)
POTASSIUM SERPL-SCNC: 4.9 MMOL/L (ref 3.5–5.1)
PROT SERPL-MCNC: 6.3 G/DL (ref 6.4–8.2)
PROT UR STRIP.AUTO-MCNC: NEGATIVE MG/DL
RBC # BLD AUTO: 5.29 M/UL (ref 4.2–5.9)
SAO2 % BLDV: 98 %
SARS-COV-2 RNA RESP QL NAA+PROBE: NOT DETECTED
SODIUM SERPL-SCNC: 137 MMOL/L (ref 136–145)
SODIUM SERPL-SCNC: 137 MMOL/L (ref 136–145)
SODIUM SERPL-SCNC: 140 MMOL/L (ref 136–145)
SP GR UR STRIP.AUTO: 1.01 (ref 1–1.03)
UA COMPLETE W REFLEX CULTURE PNL UR: ABNORMAL
UA DIPSTICK W REFLEX MICRO PNL UR: ABNORMAL
URN SPEC COLLECT METH UR: ABNORMAL
UROBILINOGEN UR STRIP-ACNC: 0.2 E.U./DL
WBC # BLD AUTO: 7.7 K/UL (ref 4–11)

## 2024-03-11 PROCEDURE — 2000000000 HC ICU R&B

## 2024-03-11 PROCEDURE — 96374 THER/PROPH/DIAG INJ IV PUSH: CPT

## 2024-03-11 PROCEDURE — 83930 ASSAY OF BLOOD OSMOLALITY: CPT

## 2024-03-11 PROCEDURE — 82010 KETONE BODYS QUAN: CPT

## 2024-03-11 PROCEDURE — 36415 COLL VENOUS BLD VENIPUNCTURE: CPT

## 2024-03-11 PROCEDURE — 96361 HYDRATE IV INFUSION ADD-ON: CPT

## 2024-03-11 PROCEDURE — 83735 ASSAY OF MAGNESIUM: CPT

## 2024-03-11 PROCEDURE — 85025 COMPLETE CBC W/AUTO DIFF WBC: CPT

## 2024-03-11 PROCEDURE — 83935 ASSAY OF URINE OSMOLALITY: CPT

## 2024-03-11 PROCEDURE — 87636 SARSCOV2 & INF A&B AMP PRB: CPT

## 2024-03-11 PROCEDURE — 6360000002 HC RX W HCPCS: Performed by: EMERGENCY MEDICINE

## 2024-03-11 PROCEDURE — 2500000003 HC RX 250 WO HCPCS: Performed by: EMERGENCY MEDICINE

## 2024-03-11 PROCEDURE — 83036 HEMOGLOBIN GLYCOSYLATED A1C: CPT

## 2024-03-11 PROCEDURE — 2580000003 HC RX 258: Performed by: EMERGENCY MEDICINE

## 2024-03-11 PROCEDURE — 82803 BLOOD GASES ANY COMBINATION: CPT

## 2024-03-11 PROCEDURE — 96375 TX/PRO/DX INJ NEW DRUG ADDON: CPT

## 2024-03-11 PROCEDURE — 83605 ASSAY OF LACTIC ACID: CPT

## 2024-03-11 PROCEDURE — 81003 URINALYSIS AUTO W/O SCOPE: CPT

## 2024-03-11 PROCEDURE — 84100 ASSAY OF PHOSPHORUS: CPT

## 2024-03-11 PROCEDURE — 6370000000 HC RX 637 (ALT 250 FOR IP): Performed by: EMERGENCY MEDICINE

## 2024-03-11 PROCEDURE — 99285 EMERGENCY DEPT VISIT HI MDM: CPT

## 2024-03-11 PROCEDURE — 80053 COMPREHEN METABOLIC PANEL: CPT

## 2024-03-11 RX ORDER — MAGNESIUM SULFATE IN WATER 40 MG/ML
2000 INJECTION, SOLUTION INTRAVENOUS PRN
Status: DISCONTINUED | OUTPATIENT
Start: 2024-03-11 | End: 2024-03-11 | Stop reason: SDUPTHER

## 2024-03-11 RX ORDER — MORPHINE SULFATE 4 MG/ML
4 INJECTION, SOLUTION INTRAMUSCULAR; INTRAVENOUS ONCE
Status: COMPLETED | OUTPATIENT
Start: 2024-03-11 | End: 2024-03-11

## 2024-03-11 RX ORDER — ONDANSETRON 2 MG/ML
4 INJECTION INTRAMUSCULAR; INTRAVENOUS ONCE
Status: COMPLETED | OUTPATIENT
Start: 2024-03-11 | End: 2024-03-11

## 2024-03-11 RX ORDER — 0.9 % SODIUM CHLORIDE 0.9 %
15 INTRAVENOUS SOLUTION INTRAVENOUS ONCE
Status: DISCONTINUED | OUTPATIENT
Start: 2024-03-11 | End: 2024-03-12 | Stop reason: HOSPADM

## 2024-03-11 RX ORDER — POTASSIUM CHLORIDE 7.45 MG/ML
10 INJECTION INTRAVENOUS PRN
Status: DISCONTINUED | OUTPATIENT
Start: 2024-03-11 | End: 2024-03-12

## 2024-03-11 RX ORDER — MAGNESIUM SULFATE IN WATER 40 MG/ML
2000 INJECTION, SOLUTION INTRAVENOUS PRN
Status: DISCONTINUED | OUTPATIENT
Start: 2024-03-11 | End: 2024-03-12 | Stop reason: HOSPADM

## 2024-03-11 RX ORDER — SODIUM CHLORIDE 9 MG/ML
INJECTION, SOLUTION INTRAVENOUS CONTINUOUS
Status: DISCONTINUED | OUTPATIENT
Start: 2024-03-11 | End: 2024-03-12

## 2024-03-11 RX ORDER — 0.9 % SODIUM CHLORIDE 0.9 %
1000 INTRAVENOUS SOLUTION INTRAVENOUS ONCE
Status: COMPLETED | OUTPATIENT
Start: 2024-03-11 | End: 2024-03-11

## 2024-03-11 RX ORDER — SODIUM CHLORIDE 9 MG/ML
INJECTION, SOLUTION INTRAVENOUS CONTINUOUS
Status: DISCONTINUED | OUTPATIENT
Start: 2024-03-12 | End: 2024-03-12

## 2024-03-11 RX ORDER — POLYETHYLENE GLYCOL 3350 17 G/17G
17 POWDER, FOR SOLUTION ORAL DAILY PRN
Status: DISCONTINUED | OUTPATIENT
Start: 2024-03-11 | End: 2024-03-12 | Stop reason: HOSPADM

## 2024-03-11 RX ORDER — ENOXAPARIN SODIUM 100 MG/ML
40 INJECTION SUBCUTANEOUS DAILY
Status: DISCONTINUED | OUTPATIENT
Start: 2024-03-12 | End: 2024-03-12

## 2024-03-11 RX ORDER — DEXTROSE AND SODIUM CHLORIDE 5; .45 G/100ML; G/100ML
INJECTION, SOLUTION INTRAVENOUS CONTINUOUS PRN
Status: DISCONTINUED | OUTPATIENT
Start: 2024-03-11 | End: 2024-03-12 | Stop reason: HOSPADM

## 2024-03-11 RX ORDER — 0.9 % SODIUM CHLORIDE 0.9 %
15 INTRAVENOUS SOLUTION INTRAVENOUS ONCE
Status: COMPLETED | OUTPATIENT
Start: 2024-03-11 | End: 2024-03-11

## 2024-03-11 RX ORDER — DEXTROSE MONOHYDRATE, SODIUM CHLORIDE, AND POTASSIUM CHLORIDE 50; 1.49; 4.5 G/1000ML; G/1000ML; G/1000ML
INJECTION, SOLUTION INTRAVENOUS CONTINUOUS PRN
Status: DISCONTINUED | OUTPATIENT
Start: 2024-03-11 | End: 2024-03-12 | Stop reason: HOSPADM

## 2024-03-11 RX ORDER — POTASSIUM CHLORIDE 7.45 MG/ML
10 INJECTION INTRAVENOUS PRN
Status: DISCONTINUED | OUTPATIENT
Start: 2024-03-11 | End: 2024-03-11 | Stop reason: SDUPTHER

## 2024-03-11 RX ADMIN — INSULIN HUMAN 14 UNITS: 100 INJECTION, SOLUTION PARENTERAL at 17:34

## 2024-03-11 RX ADMIN — SODIUM CHLORIDE 1440 ML: 9 INJECTION, SOLUTION INTRAVENOUS at 18:46

## 2024-03-11 RX ADMIN — SODIUM CHLORIDE 1000 ML: 9 INJECTION, SOLUTION INTRAVENOUS at 15:22

## 2024-03-11 RX ADMIN — MORPHINE SULFATE 4 MG: 4 INJECTION, SOLUTION INTRAMUSCULAR; INTRAVENOUS at 21:51

## 2024-03-11 RX ADMIN — SODIUM CHLORIDE 4.08 UNITS/HR: 9 INJECTION, SOLUTION INTRAVENOUS at 19:13

## 2024-03-11 RX ADMIN — ONDANSETRON 4 MG: 2 INJECTION INTRAMUSCULAR; INTRAVENOUS at 18:45

## 2024-03-11 RX ADMIN — POTASSIUM CHLORIDE, DEXTROSE MONOHYDRATE AND SODIUM CHLORIDE: 150; 5; 450 INJECTION, SOLUTION INTRAVENOUS at 21:51

## 2024-03-11 ASSESSMENT — PAIN - FUNCTIONAL ASSESSMENT
PAIN_FUNCTIONAL_ASSESSMENT: NONE - DENIES PAIN
PAIN_FUNCTIONAL_ASSESSMENT: NONE - DENIES PAIN

## 2024-03-11 ASSESSMENT — PAIN DESCRIPTION - ORIENTATION: ORIENTATION: RIGHT

## 2024-03-11 ASSESSMENT — PAIN DESCRIPTION - LOCATION: LOCATION: SHOULDER

## 2024-03-11 ASSESSMENT — LIFESTYLE VARIABLES
HOW MANY STANDARD DRINKS CONTAINING ALCOHOL DO YOU HAVE ON A TYPICAL DAY: PATIENT DOES NOT DRINK
HOW OFTEN DO YOU HAVE A DRINK CONTAINING ALCOHOL: NEVER

## 2024-03-11 ASSESSMENT — PAIN DESCRIPTION - DESCRIPTORS: DESCRIPTORS: ACHING;GNAWING

## 2024-03-11 ASSESSMENT — ENCOUNTER SYMPTOMS
SHORTNESS OF BREATH: 0
VOMITING: 0
NAUSEA: 1
COUGH: 0

## 2024-03-11 ASSESSMENT — PAIN SCALES - GENERAL: PAINLEVEL_OUTOF10: 8

## 2024-03-11 NOTE — ED PROVIDER NOTES
Emergency Department Attending Physician Note  Location: [facility] ED  3/11/2024       Pt Name: Matt Richey Jr.  MRN: [id number]  Birthdate 1985    Date of evaluation: 3/11/2024  Provider: MENG SUGGS DO  PCP: Kody Best MD    Note Started: 3:14 PM EDT 3/11/24    CHIEF COMPLAINT  Chief Complaint   Patient presents with    Hyperglycemia     Pt ran out of insulin yesterday arrived via squad       ROOM: 1    HISTORY OF PRESENT ILLNESS:  History obtained by patient. Limitations to history : None.     Matt Richey Jr. is a 38 y.o. male with a significant PMHx of insulin-dependent diabetes, previous admission for DKA in end of February 2023, history of celiac disease, and other comorbidities as listed below, presenting emergency department today with concerns of hyperglycemia, as he ran out of insulin yesterday.  Here, arrives tachycardic to 115.  Here in the emergency department, patient has not had any vomiting, but is quite nauseated, and says \"that is what I felt like last time.\"  He also says that he has had polydipsia and polyuria since yesterday afternoon when he took his last dose of short acting insulin.  Ran out of insulin yesterday, has yet to get some from the pharmacy.  He walked down to the fire department when his blood sugar was over 500 at home.  He is also says he felt weak in his legs and shaky all over.  No back pain, lightheadedness, dizziness, but notes a headache that is coming on.  Gradual in onset, denies any vision changes, neck pain, or any other concerns.  No fevers or chills.  No wounds or injuries.  Did not fall, did not hit his head, denies any presyncope.      Nursing Notes were all reviewed and agreed with or any disagreements were addressed in the HPI.      MEDICAL HISTORY  Past Medical History:   Diagnosis Date    Anesthesia complication     BLOOD PRESSURE DROP IN OR DURING THR    Celiac disease     Dermatomyositis (HCC)     Last attack 2009

## 2024-03-12 VITALS
OXYGEN SATURATION: 95 % | HEART RATE: 100 BPM | TEMPERATURE: 98.3 F | SYSTOLIC BLOOD PRESSURE: 140 MMHG | RESPIRATION RATE: 17 BRPM | HEIGHT: 69 IN | DIASTOLIC BLOOD PRESSURE: 85 MMHG | WEIGHT: 247.58 LBS | BODY MASS INDEX: 36.67 KG/M2

## 2024-03-12 LAB
ANION GAP SERPL CALCULATED.3IONS-SCNC: 10 MMOL/L (ref 3–16)
ANION GAP SERPL CALCULATED.3IONS-SCNC: 13 MMOL/L (ref 3–16)
BUN SERPL-MCNC: 16 MG/DL (ref 7–20)
BUN SERPL-MCNC: 17 MG/DL (ref 7–20)
CALCIUM SERPL-MCNC: 8.3 MG/DL (ref 8.3–10.6)
CALCIUM SERPL-MCNC: 8.6 MG/DL (ref 8.3–10.6)
CHLORIDE SERPL-SCNC: 105 MMOL/L (ref 99–110)
CHLORIDE SERPL-SCNC: 106 MMOL/L (ref 99–110)
CO2 SERPL-SCNC: 20 MMOL/L (ref 21–32)
CO2 SERPL-SCNC: 22 MMOL/L (ref 21–32)
CREAT SERPL-MCNC: 0.8 MG/DL (ref 0.9–1.3)
CREAT SERPL-MCNC: 0.8 MG/DL (ref 0.9–1.3)
EST. AVERAGE GLUCOSE BLD GHB EST-MCNC: 171.4 MG/DL
EST. AVERAGE GLUCOSE BLD GHB EST-MCNC: 174.3 MG/DL
GFR SERPLBLD CREATININE-BSD FMLA CKD-EPI: >60 ML/MIN/{1.73_M2}
GFR SERPLBLD CREATININE-BSD FMLA CKD-EPI: >60 ML/MIN/{1.73_M2}
GLUCOSE BLD-MCNC: 135 MG/DL (ref 70–99)
GLUCOSE BLD-MCNC: 138 MG/DL (ref 70–99)
GLUCOSE BLD-MCNC: 161 MG/DL (ref 70–99)
GLUCOSE BLD-MCNC: 174 MG/DL (ref 70–99)
GLUCOSE BLD-MCNC: 186 MG/DL (ref 70–99)
GLUCOSE BLD-MCNC: 187 MG/DL (ref 70–99)
GLUCOSE BLD-MCNC: 190 MG/DL (ref 70–99)
GLUCOSE BLD-MCNC: 200 MG/DL (ref 70–99)
GLUCOSE BLD-MCNC: 224 MG/DL (ref 70–99)
GLUCOSE BLD-MCNC: 291 MG/DL (ref 70–99)
GLUCOSE BLD-MCNC: 379 MG/DL (ref 70–99)
GLUCOSE SERPL-MCNC: 179 MG/DL (ref 70–99)
GLUCOSE SERPL-MCNC: 215 MG/DL (ref 70–99)
HBA1C MFR BLD: 7.6 %
HBA1C MFR BLD: 7.7 %
MAGNESIUM SERPL-MCNC: 1.7 MG/DL (ref 1.8–2.4)
MAGNESIUM SERPL-MCNC: 1.9 MG/DL (ref 1.8–2.4)
PERFORMED ON: ABNORMAL
PHOSPHATE SERPL-MCNC: 3.2 MG/DL (ref 2.5–4.9)
PHOSPHATE SERPL-MCNC: 3.5 MG/DL (ref 2.5–4.9)
POTASSIUM SERPL-SCNC: 4.5 MMOL/L (ref 3.5–5.1)
POTASSIUM SERPL-SCNC: 4.7 MMOL/L (ref 3.5–5.1)
SODIUM SERPL-SCNC: 138 MMOL/L (ref 136–145)
SODIUM SERPL-SCNC: 138 MMOL/L (ref 136–145)

## 2024-03-12 PROCEDURE — 6370000000 HC RX 637 (ALT 250 FOR IP): Performed by: INTERNAL MEDICINE

## 2024-03-12 PROCEDURE — 83735 ASSAY OF MAGNESIUM: CPT

## 2024-03-12 PROCEDURE — 84100 ASSAY OF PHOSPHORUS: CPT

## 2024-03-12 PROCEDURE — 83036 HEMOGLOBIN GLYCOSYLATED A1C: CPT

## 2024-03-12 PROCEDURE — 99223 1ST HOSP IP/OBS HIGH 75: CPT | Performed by: INTERNAL MEDICINE

## 2024-03-12 PROCEDURE — 99238 HOSP IP/OBS DSCHRG MGMT 30/<: CPT | Performed by: INTERNAL MEDICINE

## 2024-03-12 PROCEDURE — 2500000003 HC RX 250 WO HCPCS: Performed by: INTERNAL MEDICINE

## 2024-03-12 PROCEDURE — 36415 COLL VENOUS BLD VENIPUNCTURE: CPT

## 2024-03-12 PROCEDURE — 80048 BASIC METABOLIC PNL TOTAL CA: CPT

## 2024-03-12 PROCEDURE — 6360000002 HC RX W HCPCS: Performed by: INTERNAL MEDICINE

## 2024-03-12 RX ORDER — INSULIN LISPRO 100 [IU]/ML
0-4 INJECTION, SOLUTION INTRAVENOUS; SUBCUTANEOUS NIGHTLY
Status: DISCONTINUED | OUTPATIENT
Start: 2024-03-12 | End: 2024-03-12 | Stop reason: HOSPADM

## 2024-03-12 RX ORDER — INSULIN GLARGINE 100 [IU]/ML
25 INJECTION, SOLUTION SUBCUTANEOUS ONCE
Status: COMPLETED | OUTPATIENT
Start: 2024-03-12 | End: 2024-03-12

## 2024-03-12 RX ORDER — GLUCAGON 1 MG/ML
1 KIT INJECTION PRN
Status: DISCONTINUED | OUTPATIENT
Start: 2024-03-12 | End: 2024-03-12 | Stop reason: HOSPADM

## 2024-03-12 RX ORDER — INSULIN GLARGINE 100 [IU]/ML
25 INJECTION, SOLUTION SUBCUTANEOUS NIGHTLY
Status: DISCONTINUED | OUTPATIENT
Start: 2024-03-12 | End: 2024-03-12

## 2024-03-12 RX ORDER — TOPIRAMATE 50 MG/1
50 TABLET, FILM COATED ORAL
Status: DISCONTINUED | OUTPATIENT
Start: 2024-03-12 | End: 2024-03-12 | Stop reason: HOSPADM

## 2024-03-12 RX ORDER — PREGABALIN 100 MG/1
300 CAPSULE ORAL 2 TIMES DAILY
Status: DISCONTINUED | OUTPATIENT
Start: 2024-03-12 | End: 2024-03-12 | Stop reason: HOSPADM

## 2024-03-12 RX ORDER — INSULIN ASPART 100 [IU]/ML
INJECTION, SOLUTION INTRAVENOUS; SUBCUTANEOUS
Qty: 10 ML | Refills: 0 | Status: SHIPPED | OUTPATIENT
Start: 2024-03-12

## 2024-03-12 RX ORDER — ACETAMINOPHEN 325 MG/1
650 TABLET ORAL EVERY 4 HOURS PRN
Status: DISCONTINUED | OUTPATIENT
Start: 2024-03-12 | End: 2024-03-12 | Stop reason: HOSPADM

## 2024-03-12 RX ORDER — DEXTROSE MONOHYDRATE 100 MG/ML
INJECTION, SOLUTION INTRAVENOUS CONTINUOUS PRN
Status: DISCONTINUED | OUTPATIENT
Start: 2024-03-12 | End: 2024-03-12 | Stop reason: HOSPADM

## 2024-03-12 RX ORDER — INSULIN LISPRO 100 [IU]/ML
6 INJECTION, SOLUTION INTRAVENOUS; SUBCUTANEOUS ONCE
Status: COMPLETED | OUTPATIENT
Start: 2024-03-12 | End: 2024-03-12

## 2024-03-12 RX ORDER — BUPROPION HYDROCHLORIDE 150 MG/1
150 TABLET ORAL EVERY MORNING
Status: DISCONTINUED | OUTPATIENT
Start: 2024-03-12 | End: 2024-03-12 | Stop reason: HOSPADM

## 2024-03-12 RX ORDER — PANTOPRAZOLE SODIUM 40 MG/1
40 TABLET, DELAYED RELEASE ORAL
Status: DISCONTINUED | OUTPATIENT
Start: 2024-03-12 | End: 2024-03-12 | Stop reason: HOSPADM

## 2024-03-12 RX ORDER — BUPROPION HYDROCHLORIDE 150 MG/1
300 TABLET ORAL EVERY MORNING
Status: DISCONTINUED | OUTPATIENT
Start: 2024-03-12 | End: 2024-03-12 | Stop reason: HOSPADM

## 2024-03-12 RX ORDER — ENOXAPARIN SODIUM 100 MG/ML
30 INJECTION SUBCUTANEOUS 2 TIMES DAILY
Status: DISCONTINUED | OUTPATIENT
Start: 2024-03-12 | End: 2024-03-12 | Stop reason: HOSPADM

## 2024-03-12 RX ORDER — HYDROCODONE BITARTRATE AND ACETAMINOPHEN 7.5; 325 MG/1; MG/1
1 TABLET ORAL ONCE
Status: COMPLETED | OUTPATIENT
Start: 2024-03-12 | End: 2024-03-12

## 2024-03-12 RX ORDER — HYDROXYCHLOROQUINE SULFATE 200 MG/1
200 TABLET, FILM COATED ORAL 2 TIMES DAILY
Status: DISCONTINUED | OUTPATIENT
Start: 2024-03-12 | End: 2024-03-12 | Stop reason: HOSPADM

## 2024-03-12 RX ORDER — ROSUVASTATIN CALCIUM 10 MG/1
20 TABLET, COATED ORAL DAILY
Status: DISCONTINUED | OUTPATIENT
Start: 2024-03-12 | End: 2024-03-12 | Stop reason: HOSPADM

## 2024-03-12 RX ORDER — INSULIN LISPRO 100 [IU]/ML
0-8 INJECTION, SOLUTION INTRAVENOUS; SUBCUTANEOUS
Status: DISCONTINUED | OUTPATIENT
Start: 2024-03-12 | End: 2024-03-12 | Stop reason: HOSPADM

## 2024-03-12 RX ADMIN — PREGABALIN 300 MG: 100 CAPSULE ORAL at 08:38

## 2024-03-12 RX ADMIN — ENOXAPARIN SODIUM 40 MG: 100 INJECTION SUBCUTANEOUS at 08:39

## 2024-03-12 RX ADMIN — HYDROXYCHLOROQUINE SULFATE 200 MG: 200 TABLET ORAL at 08:38

## 2024-03-12 RX ADMIN — CARIPRAZINE 6 MG: 3 CAPSULE, GELATIN COATED ORAL at 09:33

## 2024-03-12 RX ADMIN — ACETAMINOPHEN 650 MG: 325 TABLET ORAL at 13:12

## 2024-03-12 RX ADMIN — ACETAMINOPHEN 650 MG: 325 TABLET ORAL at 08:38

## 2024-03-12 RX ADMIN — POTASSIUM CHLORIDE 10 MEQ: 7.46 INJECTION, SOLUTION INTRAVENOUS at 04:11

## 2024-03-12 RX ADMIN — INSULIN LISPRO 4 UNITS: 100 INJECTION, SOLUTION INTRAVENOUS; SUBCUTANEOUS at 11:33

## 2024-03-12 RX ADMIN — POTASSIUM CHLORIDE 10 MEQ: 7.46 INJECTION, SOLUTION INTRAVENOUS at 05:19

## 2024-03-12 RX ADMIN — INSULIN GLARGINE 25 UNITS: 100 INJECTION, SOLUTION SUBCUTANEOUS at 08:43

## 2024-03-12 RX ADMIN — ROSUVASTATIN CALCIUM 20 MG: 10 TABLET, FILM COATED ORAL at 08:38

## 2024-03-12 RX ADMIN — LISINOPRIL 30 MG: 10 TABLET ORAL at 08:38

## 2024-03-12 RX ADMIN — PANTOPRAZOLE SODIUM 40 MG: 40 TABLET, DELAYED RELEASE ORAL at 08:38

## 2024-03-12 RX ADMIN — POTASSIUM CHLORIDE 10 MEQ: 7.46 INJECTION, SOLUTION INTRAVENOUS at 01:21

## 2024-03-12 RX ADMIN — INSULIN LISPRO 6 UNITS: 100 INJECTION, SOLUTION INTRAVENOUS; SUBCUTANEOUS at 13:09

## 2024-03-12 RX ADMIN — POTASSIUM CHLORIDE 10 MEQ: 7.46 INJECTION, SOLUTION INTRAVENOUS at 00:17

## 2024-03-12 RX ADMIN — HYDROCODONE BITARTRATE AND ACETAMINOPHEN 1 TABLET: 7.5; 325 TABLET ORAL at 02:31

## 2024-03-12 RX ADMIN — BUPROPION HYDROCHLORIDE 300 MG: 150 TABLET, EXTENDED RELEASE ORAL at 08:38

## 2024-03-12 RX ADMIN — POTASSIUM CHLORIDE, DEXTROSE MONOHYDRATE AND SODIUM CHLORIDE: 150; 5; 450 INJECTION, SOLUTION INTRAVENOUS at 04:53

## 2024-03-12 ASSESSMENT — PAIN SCALES - GENERAL
PAINLEVEL_OUTOF10: 3
PAINLEVEL_OUTOF10: 7
PAINLEVEL_OUTOF10: 7
PAINLEVEL_OUTOF10: 5
PAINLEVEL_OUTOF10: 7
PAINLEVEL_OUTOF10: 9

## 2024-03-12 ASSESSMENT — PAIN DESCRIPTION - DESCRIPTORS
DESCRIPTORS: ACHING;DISCOMFORT
DESCRIPTORS: THROBBING

## 2024-03-12 ASSESSMENT — PAIN DESCRIPTION - LOCATION
LOCATION: SHOULDER
LOCATION: HEAD
LOCATION: SHOULDER

## 2024-03-12 ASSESSMENT — PAIN - FUNCTIONAL ASSESSMENT
PAIN_FUNCTIONAL_ASSESSMENT: ACTIVITIES ARE NOT PREVENTED
PAIN_FUNCTIONAL_ASSESSMENT: ACTIVITIES ARE NOT PREVENTED

## 2024-03-12 ASSESSMENT — PAIN DESCRIPTION - ORIENTATION
ORIENTATION: RIGHT
ORIENTATION: RIGHT;LEFT

## 2024-03-12 NOTE — PROGRESS NOTES
Patient's ride here for . Patient has had insulin filled at preferred pharmacy which he says will drop medication off at his house so he would like to go. Discussed with outpatient pharmacy, they can see that it has been filled at his pharmacy. Patient provided with discharge instructions. Patient questions answered. Peripheral IVs removed. Patient off unit via wheelchair to private car.

## 2024-03-12 NOTE — ED NOTES
Blood sugar 509 POC. bjowers  
Green top redrawn. jocelin  
Poc glucose 239  bjowers  
Pt still c/o nausea.  No vomiting noted.  Pt had small bowel movement in restroom.  Lupe mckeon  
show

## 2024-03-12 NOTE — PROGRESS NOTES
03/12/24 1001   Encounter Summary   Encounter Overview/Reason  Spiritual/Emotional Needs;Advance Care Planning   Service Provided For: Patient   Referral/Consult From: Nurse   Support System Spouse   Last Encounter  03/12/24  (ACP conversation, emotional support , nurtured hope)   Complexity of Encounter Moderate   Begin Time 0940   End Time  1002   Total Time Calculated 22 min   Spiritual/Emotional needs   Type Spiritual Support   Advance Care Planning   Type ACP conversation     Pt not ready to complete today. Would like to take the forms home.

## 2024-03-12 NOTE — PROGRESS NOTES
Patient was transported from ER per stretcher with portable monitor and IV infusions. After arrival to ICU bed 1, patient ambulated self to room bed. Balance steady. Patient A&O x4.Connected patient to room cardiac monitor. SR - ST rhythm. Oriented patient to room, staff and unit procedures. Complete CHG bath given and clothes changed into gown and pajama bottoms. Call light in reach. Bed in lowest position with wheels locked.     Patient is able to demonstrate the ability to move from a reclining position to an upright position within the recliner.    4 Eyes Skin Assessment     NAME:  Matt Richey Jr.  YOB: 1985  MEDICAL RECORD NUMBER:  [id number]    The patient is being assessed for  Admission    I agree that at least one RN has performed a thorough Head to Toe Skin Assessment on the patient. ALL assessment sites listed below have been assessed.      Areas assessed by both nurses:    Head, Face, Ears, Shoulders, Back, Chest, Arms, Elbows, Hands, Sacrum. Buttock, Coccyx, Ischium, Legs. Feet and Heels, and Under Medical Devices         Does the Patient have a Wound? No noted wound(s)       Shivam Prevention initiated by RN: No  Wound Care Orders initiated by RN: No    Pressure Injury (Stage 3,4, Unstageable, DTI, NWPT, and Complex wounds) if present, place Wound referral order by RN under : No    New Ostomies, if present place, Ostomy referral order under : No     Nurse 1 eSignature: Electronically signed by Julia Knutson RN on 3/12/24 at 1:36 AM EDT    **SHARE this note so that the co-signing nurse can place an eSignature**    Nurse 2 eSignature: Electronically signed by Gisselle Finn RN on 3/12/24 at 1:44 AM EDT

## 2024-03-12 NOTE — ACP (ADVANCE CARE PLANNING)
ACP conversation, Pt is not ready to complete today. Would like to take the forms home. CH will remain available for spiritual support.

## 2024-03-12 NOTE — H&P
V2.0  History and Physical      Name:  Matt Richey Jr. /Age/Sex: 1985  (38 y.o. male)   MRN & CSN:  [id number] & [id number] Encounter Date/Time: 3/11/2024 11:30 PM EDT   Location:  [facility] PCP: Kody Best MD       Hospital Day: 1    Assessment and Plan:   Matt Ricehy Jr. is a 38 y.o. male with a pmh of type 1 diabetes mellitus managed with insulin delivery system, hypertension, rheumatoid arthritis, history of dermatomyositis in remission, history of celiac disease, diabetic neuropathy, fibromyalgia, bilateral hip replacements, and schizoaffective disease who presents with blurred vision and fatigue.    Hospital Problems             Last Modified POA    * (Principal) DKA, type 1, not at goal (HCC) 3/11/2024 Yes    Benign essential HTN 3/11/2024 Yes         Principal Problem:    DKA, type 1, not at goal (HCC)  Plan:   Continue DKA protocol with continuous insulin infusion until he can be transitioned to another strategy  Does not appear that patient has any way to bring him in a new prescription of his insulin in his pump supplies.  Alternatively, the nurse is concerned that there is a financial issue and she has consulted social work.  When we are assured that he has the appropriate medication available to him he will likely be able to be discharged  Likely would be advisable to give a small dose of Lantus insulin prior to dismissal until he has a chance to hook up his insulin pump.    Active Problems:    Benign essential HTN  Plan:   Continue home medications       Admit to In-Patient    Disposition:   Current Living situation: Home  Expected Disposition: Home  Estimated D/C: 1-2 days    Diet Diabetic   DVT Prophylaxis [x] Lovenox, []  Heparin, [] SCDs, [] Ambulation,  [] Eliquis, [] Xarelto, [] Coumadin   Code Status Full Code   Surrogate Decision Maker/ POA Unknown     Personally reviewed Lab Studies and Imaging     Discussed management with the ED provider and agree with the plan

## 2024-03-12 NOTE — PROGRESS NOTES
Shift assessment complete.    Patient seen awake in bed. Patient alert and oriented x 4. Patient with complaint of headache 9/10, PRN Tylenol provided. Patient with no s/s of distress noted at this time.    Physical assessment as charted in flow sheets.    Scheduled medications given per orders.    IVF infusing at 150 mL/hr.  Insulin gtt infusing per MAR.    Peripheral IV C/D/I and functioning properly.    Patient is able to ambulate to personal toilet.    Patient educated on use of call light and to call out with needs, verbalized understanding. Call light and personal belongings within reach.

## 2024-03-12 NOTE — CONSULTS
Pulmonary & Critical Care Consultation Note    Patient is being seen at the request of Avery Flores MD   for a consultation for DKA    HISTORY OF PRESENT ILLNESS:   38 years old with history of type 1 diabetes on insulin presented with hyperglycemia past 2 days. Severe, max 506. Associated with Nausea. No vomiting and little belly pain. ED workup revealed DKA.  He ran out of his insulin on Sunday.     PAST MEDICAL HISTORY:  Past Medical History:   Diagnosis Date    Anesthesia complication     BLOOD PRESSURE DROP IN OR DURING THR    Celiac disease     Dermatomyositis (HCC)     Last attack 2009    Diabetes mellitus (HCC)     TYPE I    Fibromyalgia     Hyperlipidemia     Hypertension     Juvenile rheumatoid arthritis (HCC)     Movement disorder     DERMATOMYOSITIS/IN REMISSION    Rheumatoid arthritis(714.0)     Vertebral basilar insufficiency     symptoms related to JRA in cervical area    Wears glasses      PAST SURGICAL HISTORY:  Past Surgical History:   Procedure Laterality Date    HIP SURGERY      bilateral    JOINT REPLACEMENT Bilateral     THR    JOINT REPLACEMENT Left 6/3/2016    Revision left lateral total hip replacement    KNEE ARTHROSCOPY Right     KNEE ARTHROSCOPY Left 9/25/2013    medial & lateral Meniscectomy    LIVER BIOPSY      MUSCLE BIOPSY      TYMPANOSTOMY TUBE PLACEMENT      WRIST SURGERY Right     CYST       FAMILY HISTORY:  family history includes Diabetes in his father; Heart Disease in his maternal grandmother; High Blood Pressure in his mother.    SOCIAL HISTORY:   reports that he has never smoked. He has never used smokeless tobacco.    Scheduled Meds:   buPROPion  150 mg Oral QAM    buPROPion  300 mg Oral QAM    hydroxychloroquine  200 mg Oral BID    lisinopril  30 mg Oral Daily    pantoprazole  40 mg Oral QAM AC    pregabalin  300 mg Oral BID    rosuvastatin  20 mg Oral Daily    Cariprazine HCl  6 mg Oral Daily    topiramate  50 mg Oral QHS    enoxaparin  40 mg SubCUTAneous Daily

## 2024-03-12 NOTE — PROGRESS NOTES
Reassessment complete.    Patient remains awake in bed. Patient with unchanged physical assessment at this time. Patient with lunch tray, sliding scale insulin given per order. Patient with no needs voiced. Call light and personal belongings within reach.

## 2024-03-12 NOTE — PLAN OF CARE
Problem: Discharge Planning  Goal: Discharge to home or other facility with appropriate resources  3/12/2024 1154 by Ailyn Fierro RN  Outcome: Completed  3/12/2024 0715 by Julia Knutosn RN  Outcome: Progressing  Flowsheets (Taken 3/11/2024 2207)  Discharge to home or other facility with appropriate resources: Identify barriers to discharge with patient and caregiver     Problem: Pain  Goal: Verbalizes/displays adequate comfort level or baseline comfort level  3/12/2024 1154 by Ailyn Fierro RN  Outcome: Completed  3/12/2024 0715 by Julia Knutson RN  Outcome: Progressing     Problem: Chronic Conditions and Co-morbidities  Goal: Patient's chronic conditions and co-morbidity symptoms are monitored and maintained or improved  3/12/2024 1154 by Ailyn Fierro RN  Outcome: Completed  3/12/2024 0715 by Julia Knutson RN  Outcome: Progressing

## 2024-03-12 NOTE — CARE COORDINATION
[address]  [address]  [place] [address]  Phone: [phone] Fax: [phone]    [facility]/pharmacy #[id number] - [place] - [address] - P [phone] - F [phone]  [address]  [place] [address]  Phone: [phone] Fax: [phone]    [id number] - [place], [place] - [address] - P [phone] - F [phone]  [address]  [address]  [place] [address]  Phone: [phone] Fax: [phone]      Notes:    Factors facilitating achievement of predicted outcomes: Family support, Cooperative, and Pleasant    Barriers to discharge: none    Additional Case Management Notes: Met with patient at bedside. Patient is independent with self care. Lives with spouse and 2 children. No needs identified. Patient is discharging to home today. The patient's mother is transporting the patient home.     The Plan for Transition of Care is related to the following treatment goals of DKA, type 1, not at goal (HCC) [E10.10]  Diabetic ketoacidosis without coma associated with other specified diabetes mellitus (HCC) [E13.10]    IF APPLICABLE: The Patient and/or patient representative Matt and his family were provided with a choice of provider and agrees with the discharge plan. Freedom of choice list with basic dialogue that supports the patient's individualized plan of care/goals and shares the quality data associated with the providers was provided to:     Patient Representative Name:       The Patient and/or Patient Representative Agree with the Discharge Plan?      Teresa Boeck, RN  Case Management Department  Ph: [phone]

## 2024-03-12 NOTE — PROGRESS NOTES
Insulin titrated through night as per protocol. Anion Gap has been closed x2 and Co2 has normalized. Patient continued DKA protocol with plan to transition off this AM. VSS. Noted patient with some spells of sleep apnea through night with Spo2 drop to mid 70's%. Patient denied ever being told he may have sleep apnea.     Electronically signed by Julia Knutson RN on 3/12/2024 at 6:32 AM

## 2024-03-12 NOTE — ACP (ADVANCE CARE PLANNING)
Advance Care Planning     General Advance Care Planning (ACP) Conversation    Date of Conversation: 3/12/2024  Conducted with: Patient with Decision Making Capacity    The patient's spouse can speak for the patient if the patient cannot speak for himself.     Healthcare Decision Maker:    Primary Decision Maker: Ami Richey C - Spouse - [phone]  Click here to complete Healthcare Decision Makers including selection of the Healthcare Decision Maker Relationship (ie \"Primary\").   Today we documented Decision Maker(s) consistent with Legal Next of Kin hierarchy.    Content/Action Overview:  DECLINED ACP Conversation - will revisit periodically  Reviewed DNR/DNI and patient elects Full Code (Attempt Resuscitation)        Length of Voluntary ACP Conversation in minutes:  <16 minutes (Non-Billable)    Teresa Boeck, RN

## 2024-03-12 NOTE — DISCHARGE SUMMARY
Name:  Matt Richey  Room:  3001/3001-01  MRN:    [id number]    Discharge Summary      This discharge summary is in conjunction with a complete physical exam done on the day of discharge.      Discharging Physician: GERMAN RAZO MD      Admit: 3/11/2024  Discharge:  3/12/2024     Diagnoses this Admission    Principal Problem:    DKA, type 1, not at goal (HCC)  Active Problems:    Benign essential HTN  Resolved Problems:    * No resolved hospital problems. *          Procedures (Please Review Full Report for Details)      Consults    IP CONSULT TO DIABETES EDUCATOR  IP CONSULT TO DIABETES EDUCATOR  IP CONSULT TO CRITICAL CARE  IP CONSULT TO SPIRITUAL SERVICES  IP CONSULT TO SOCIAL WORK      HPI:  Matt Richey Jr. is a 38 y.o. male with a pmh of type 1 diabetes mellitus managed with insulin delivery system, hypertension, rheumatoid arthritis, history of dermatomyositis in remission, history of celiac disease, diabetic neuropathy, fibromyalgia, bilateral hip replacements, and schizoaffective disease who presents with blurred vision, nausea and fatigue..     Patient ran out of insulin on Sunday when his pharmacy was closed.  He attempted to manage his type 1 diabetes with low-carb diet but this obviously has not worked.  Patient was found by ER provider to be exhibiting diabetic ketoacidosis.  Insulin drip was initiated.  Patient's IV fluids have already been transferred to a solution with glucose because his last glucose was in the 170s.  Patient denies any cough, cold symptoms, fevers, chills, sweats or anything else acute.      Physical Exam at Discharge:  BP (!) 145/93   Pulse 96   Temp 97.6 °F (36.4 °C) (Oral)   Resp 15   Ht 1.753 m (5' 9\")   Wt 112.3 kg (247 lb 9.2 oz)   SpO2 96%   BMI 36.56 kg/m²     General: NAD  Cardiovascular: Regular rate.  Respiratory: Clear to auscultation  Gastrointestinal: Soft, non tender  Musculoskeletal: No edema  Skin: warm, dry  Neuro: Alert.

## 2024-03-12 NOTE — PROGRESS NOTES
Patient called out for dexcom reading high, this RN checked blood sugar is 379. Perfect serve to Dr. PHILLIPS to notify, new order to give 6 unis humalog. Patient has omnipod insulin pump at home.

## 2024-03-12 NOTE — DISCHARGE INSTRUCTIONS
Your information:  Name: Matt Richey JrTravis  : 1985    Your instructions:    Follow up with your primary care provider in 1-2 weeks    What to do after you leave the hospital:    Recommended diet: diabetic diet    Recommended activity: activity as tolerated        The following personal items were collected during your admission and were returned to you:    Belongings  Dental Appliances: None  Vision - Corrective Lenses: Eyeglasses  Hearing Aid: None  Clothing: Pants, Undergarments, Shirt, Jacket/Coat, Socks, Slippers  Jewelry: Ring (Black \"rubber\" ring (per patient))  Body Piercings Removed: No  Electronic Devices: Cell Phone,   Weapons (Notify Protective Services/Security): None  Other Valuables: Wallet  Home Medications: None  Patient approves for provider to speak to responsible person post operatively: Yes    Information obtained by:  By signing below, I understand that if any problems occur once I leave the hospital I am to contact primary care provider.  I understand and acknowledge receipt of the instructions indicated above.

## 2024-03-12 NOTE — PLAN OF CARE
Problem: Discharge Planning  Goal: Discharge to home or other facility with appropriate resources  Outcome: Progressing  Flowsheets (Taken 3/11/2024 2207)  Discharge to home or other facility with appropriate resources: Identify barriers to discharge with patient and caregiver     Problem: Pain  Goal: Verbalizes/displays adequate comfort level or baseline comfort level  Outcome: Progressing     Problem: Chronic Conditions and Co-morbidities  Goal: Patient's chronic conditions and co-morbidity symptoms are monitored and maintained or improved  Outcome: Progressing

## 2024-04-14 ENCOUNTER — APPOINTMENT (OUTPATIENT)
Dept: GENERAL RADIOLOGY | Age: 39
End: 2024-04-14
Payer: MEDICARE

## 2024-04-14 ENCOUNTER — HOSPITAL ENCOUNTER (EMERGENCY)
Age: 39
Discharge: HOME | End: 2024-04-14
Attending: EMERGENCY MEDICINE
Payer: MEDICARE

## 2024-04-14 VITALS
DIASTOLIC BLOOD PRESSURE: 92 MMHG | HEART RATE: 95 BPM | TEMPERATURE: 98.4 F | OXYGEN SATURATION: 98 % | RESPIRATION RATE: 16 BRPM | SYSTOLIC BLOOD PRESSURE: 136 MMHG
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
## 2024-04-14 DIAGNOSIS — T07.XXXA MULTIPLE CONTUSIONS: Primary | ICD-10-CM

## 2024-04-14 PROCEDURE — 73030 X-RAY EXAM OF SHOULDER: CPT

## 2024-04-14 PROCEDURE — 99283 EMERGENCY DEPT VISIT LOW MDM: CPT

## 2024-04-14 PROCEDURE — 6370000000 HC RX 637 (ALT 250 FOR IP): Performed by: EMERGENCY MEDICINE

## 2024-04-14 PROCEDURE — 73502 X-RAY EXAM HIP UNI 2-3 VIEWS: CPT

## 2024-04-14 PROCEDURE — 72100 X-RAY EXAM L-S SPINE 2/3 VWS: CPT

## 2024-04-14 PROCEDURE — 71101 X-RAY EXAM UNILAT RIBS/CHEST: CPT

## 2024-04-14 RX ORDER — OXYCODONE HYDROCHLORIDE AND ACETAMINOPHEN 5; 325 MG/1; MG/1
1 TABLET ORAL ONCE
Status: COMPLETED | OUTPATIENT
Start: 2024-04-14 | End: 2024-04-14

## 2024-04-14 RX ADMIN — OXYCODONE AND ACETAMINOPHEN 1 TABLET: 5; 325 TABLET ORAL at 09:30

## 2024-04-14 ASSESSMENT — PAIN SCALES - GENERAL
PAINLEVEL_OUTOF10: 10
PAINLEVEL_OUTOF10: 7

## 2024-04-14 ASSESSMENT — PAIN DESCRIPTION - LOCATION: LOCATION: BACK;HIP

## 2024-04-14 ASSESSMENT — PAIN - FUNCTIONAL ASSESSMENT: PAIN_FUNCTIONAL_ASSESSMENT: 0-10

## 2024-04-14 ASSESSMENT — PAIN DESCRIPTION - ORIENTATION: ORIENTATION: RIGHT

## 2024-04-14 NOTE — ED PROVIDER NOTES
[facility] Emergency Department      CHIEF COMPLAINT  Fall (Reports fall down steps on Thursday. C/o right shoulder, low back and right hip pain. Denies LOC/blood thinners. )      HISTORY OF PRESENT ILLNESS  Matt Richey Jr. is a 38 y.o. male with a history of diabetes, hypertension and fibromyalgia presents with right shoulder, rib, lower back pain and right hip pain.  He states 3 days ago he slipped down some stairs while it was raining outside.  He did not hit his head.  No loss of consciousness.  He thought he was just sore at first but the pain is persisting.  He is not on blood thinners.  He feels the pain in his right shoulder more so when he lifts his right arm and it is more in the axilla over the rib cage on the right.  No weakness or numbness of extremities.  No abdominal pain.  No shortness of breath..   No other complaints, modifying factors or associated symptoms.     History obtained from the patient.    I have reviewed the following from the nursing documentation.    Past Medical History:   Diagnosis Date    Anesthesia complication     BLOOD PRESSURE DROP IN OR DURING THR    Celiac disease     Dermatomyositis (HCC)     Last attack 2009    Diabetes mellitus (HCC)     TYPE I    Fibromyalgia     Hyperlipidemia     Hypertension     Juvenile rheumatoid arthritis (HCC)     Movement disorder     DERMATOMYOSITIS/IN REMISSION    Rheumatoid arthritis(714.0)     Vertebral basilar insufficiency     symptoms related to JRA in cervical area    Wears glasses      Past Surgical History:   Procedure Laterality Date    HIP SURGERY      bilateral    JOINT REPLACEMENT Bilateral     THR    JOINT REPLACEMENT Left 6/3/2016    Revision left lateral total hip replacement    KNEE ARTHROSCOPY Right     KNEE ARTHROSCOPY Left 9/25/2013    medial & lateral Meniscectomy    LIVER BIOPSY      MUSCLE BIOPSY      TYMPANOSTOMY TUBE PLACEMENT      WRIST SURGERY Right     CYST     Family History   Problem Relation Age of

## 2024-04-14 NOTE — DISCHARGE INSTRUCTIONS
Your x-rays are all normal.  You have multiple contusions.  Alternate ibuprofen and Tylenol for pain.  You can apply ice packs to the painful areas.  Please follow-up with your primary doctor early next week.

## 2024-10-26 NOTE — PROGRESS NOTES
-Replete PRN  -Trend P   Dr. PHILLIPS at bedside, new orders for lantus and sliding scale insulin received.